# Patient Record
Sex: MALE | Race: WHITE | NOT HISPANIC OR LATINO | Employment: FULL TIME | URBAN - METROPOLITAN AREA
[De-identification: names, ages, dates, MRNs, and addresses within clinical notes are randomized per-mention and may not be internally consistent; named-entity substitution may affect disease eponyms.]

---

## 2017-03-09 ENCOUNTER — ALLSCRIPTS OFFICE VISIT (OUTPATIENT)
Dept: OTHER | Facility: OTHER | Age: 47
End: 2017-03-09

## 2017-07-12 ENCOUNTER — OFFICE VISIT (OUTPATIENT)
Dept: URGENT CARE | Facility: MEDICAL CENTER | Age: 47
End: 2017-07-12
Payer: COMMERCIAL

## 2017-07-12 PROCEDURE — G0382 LEV 3 HOSP TYPE B ED VISIT: HCPCS

## 2017-07-15 ENCOUNTER — HOSPITAL ENCOUNTER (INPATIENT)
Facility: HOSPITAL | Age: 47
LOS: 3 days | Discharge: HOME | DRG: 603 | End: 2017-07-18
Attending: EMERGENCY MEDICINE | Admitting: INTERNAL MEDICINE
Payer: COMMERCIAL

## 2017-07-15 DIAGNOSIS — M71.9 BURSITIS: ICD-10-CM

## 2017-07-15 DIAGNOSIS — L03.114 CELLULITIS OF LEFT ELBOW: Primary | ICD-10-CM

## 2017-07-15 PROBLEM — M25.529 ELBOW PAIN: Status: ACTIVE | Noted: 2017-07-15

## 2017-07-15 LAB
ANION GAP SERPL CALCULATED.3IONS-SCNC: 6 MMOL/L (ref 4–13)
BASOPHILS # BLD AUTO: 0.02 THOUSANDS/ΜL (ref 0–0.1)
BASOPHILS NFR BLD AUTO: 0 % (ref 0–1)
BUN SERPL-MCNC: 10 MG/DL (ref 5–25)
CALCIUM SERPL-MCNC: 8.8 MG/DL (ref 8.3–10.1)
CHLORIDE SERPL-SCNC: 100 MMOL/L (ref 100–108)
CO2 SERPL-SCNC: 31 MMOL/L (ref 21–32)
CREAT SERPL-MCNC: 0.79 MG/DL (ref 0.6–1.3)
EOSINOPHIL # BLD AUTO: 0.1 THOUSAND/ΜL (ref 0–0.61)
EOSINOPHIL NFR BLD AUTO: 1 % (ref 0–6)
ERYTHROCYTE [DISTWIDTH] IN BLOOD BY AUTOMATED COUNT: 13.7 % (ref 11.6–15.1)
ERYTHROCYTE [SEDIMENTATION RATE] IN BLOOD: 62 MM/HOUR (ref 0–10)
GFR SERPL CREATININE-BSD FRML MDRD: >60 ML/MIN/1.73SQ M
GLUCOSE SERPL-MCNC: 95 MG/DL (ref 65–140)
HCT VFR BLD AUTO: 44 % (ref 36.5–49.3)
HGB BLD-MCNC: 15.5 G/DL (ref 12–17)
LYMPHOCYTES # BLD AUTO: 1.58 THOUSANDS/ΜL (ref 0.6–4.47)
LYMPHOCYTES NFR BLD AUTO: 23 % (ref 14–44)
MCH RBC QN AUTO: 31.2 PG (ref 26.8–34.3)
MCHC RBC AUTO-ENTMCNC: 35.2 G/DL (ref 31.4–37.4)
MCV RBC AUTO: 89 FL (ref 82–98)
MONOCYTES # BLD AUTO: 0.63 THOUSAND/ΜL (ref 0.17–1.22)
MONOCYTES NFR BLD AUTO: 9 % (ref 4–12)
NEUTROPHILS # BLD AUTO: 4.66 THOUSANDS/ΜL (ref 1.85–7.62)
NEUTS SEG NFR BLD AUTO: 67 % (ref 43–75)
NRBC BLD AUTO-RTO: 0 /100 WBCS
PLATELET # BLD AUTO: 204 THOUSANDS/UL (ref 149–390)
PMV BLD AUTO: 11.1 FL (ref 8.9–12.7)
POTASSIUM SERPL-SCNC: 4.2 MMOL/L (ref 3.5–5.3)
RBC # BLD AUTO: 4.97 MILLION/UL (ref 3.88–5.62)
SODIUM SERPL-SCNC: 137 MMOL/L (ref 136–145)
WBC # BLD AUTO: 6.99 THOUSAND/UL (ref 4.31–10.16)

## 2017-07-15 PROCEDURE — 80048 BASIC METABOLIC PNL TOTAL CA: CPT | Performed by: EMERGENCY MEDICINE

## 2017-07-15 PROCEDURE — 96365 THER/PROPH/DIAG IV INF INIT: CPT

## 2017-07-15 PROCEDURE — 36415 COLL VENOUS BLD VENIPUNCTURE: CPT | Performed by: EMERGENCY MEDICINE

## 2017-07-15 PROCEDURE — 99284 EMERGENCY DEPT VISIT MOD MDM: CPT

## 2017-07-15 PROCEDURE — 85025 COMPLETE CBC W/AUTO DIFF WBC: CPT | Performed by: EMERGENCY MEDICINE

## 2017-07-15 PROCEDURE — 85652 RBC SED RATE AUTOMATED: CPT | Performed by: EMERGENCY MEDICINE

## 2017-07-15 RX ORDER — KETOROLAC TROMETHAMINE 30 MG/ML
15 INJECTION, SOLUTION INTRAMUSCULAR; INTRAVENOUS EVERY 6 HOURS PRN
Status: DISCONTINUED | OUTPATIENT
Start: 2017-07-15 | End: 2017-07-15

## 2017-07-15 RX ORDER — ENALAPRIL MALEATE 10 MG/1
20 TABLET ORAL DAILY
Status: DISCONTINUED | OUTPATIENT
Start: 2017-07-16 | End: 2017-07-18 | Stop reason: HOSPADM

## 2017-07-15 RX ORDER — CYCLOBENZAPRINE HCL 10 MG
TABLET ORAL 3 TIMES DAILY PRN
COMMUNITY
Start: 2016-03-17 | End: 2018-03-16 | Stop reason: SDUPTHER

## 2017-07-15 RX ORDER — ENALAPRIL MALEATE 20 MG/1
TABLET ORAL DAILY
COMMUNITY
Start: 2015-03-13 | End: 2018-03-16 | Stop reason: SDUPTHER

## 2017-07-15 RX ORDER — ACETAMINOPHEN 325 MG/1
650 TABLET ORAL EVERY 6 HOURS PRN
Status: DISCONTINUED | OUTPATIENT
Start: 2017-07-15 | End: 2017-07-18 | Stop reason: HOSPADM

## 2017-07-15 RX ORDER — INDOMETHACIN 25 MG/1
25 CAPSULE ORAL
COMMUNITY
Start: 2017-07-12 | End: 2017-07-18 | Stop reason: HOSPADM

## 2017-07-15 RX ORDER — OXYCODONE HYDROCHLORIDE AND ACETAMINOPHEN 5; 325 MG/1; MG/1
1 TABLET ORAL
COMMUNITY
Start: 2017-07-13 | End: 2017-07-23

## 2017-07-15 RX ORDER — TRAMADOL HYDROCHLORIDE 50 MG/1
50 TABLET ORAL EVERY 6 HOURS PRN
Status: DISCONTINUED | OUTPATIENT
Start: 2017-07-15 | End: 2017-07-18 | Stop reason: HOSPADM

## 2017-07-15 RX ORDER — KETOROLAC TROMETHAMINE 30 MG/ML
30 INJECTION, SOLUTION INTRAMUSCULAR; INTRAVENOUS ONCE
Status: COMPLETED | OUTPATIENT
Start: 2017-07-15 | End: 2017-07-15

## 2017-07-15 RX ORDER — AMLODIPINE BESYLATE 5 MG/1
5 TABLET ORAL DAILY
Status: DISCONTINUED | OUTPATIENT
Start: 2017-07-16 | End: 2017-07-18 | Stop reason: HOSPADM

## 2017-07-15 RX ORDER — KETOROLAC TROMETHAMINE 30 MG/ML
30 INJECTION, SOLUTION INTRAMUSCULAR; INTRAVENOUS EVERY 6 HOURS PRN
Status: DISCONTINUED | OUTPATIENT
Start: 2017-07-15 | End: 2017-07-17

## 2017-07-15 RX ORDER — CEPHALEXIN 500 MG/1
500 CAPSULE ORAL EVERY 6 HOURS SCHEDULED
COMMUNITY
Start: 2017-07-13 | End: 2017-07-20

## 2017-07-15 RX ORDER — CYCLOBENZAPRINE HCL 10 MG
10 TABLET ORAL 3 TIMES DAILY PRN
Status: DISCONTINUED | OUTPATIENT
Start: 2017-07-15 | End: 2017-07-18 | Stop reason: HOSPADM

## 2017-07-15 RX ORDER — ONDANSETRON 2 MG/ML
4 INJECTION INTRAMUSCULAR; INTRAVENOUS EVERY 6 HOURS PRN
Status: DISCONTINUED | OUTPATIENT
Start: 2017-07-15 | End: 2017-07-18 | Stop reason: HOSPADM

## 2017-07-15 RX ORDER — AMLODIPINE BESYLATE 5 MG/1
5 TABLET ORAL DAILY
COMMUNITY
Start: 2015-03-13 | End: 2018-03-16 | Stop reason: SDUPTHER

## 2017-07-15 RX ADMIN — CEFAZOLIN SODIUM 2000 MG: 2 SOLUTION INTRAVENOUS at 12:10

## 2017-07-15 RX ADMIN — CEFAZOLIN SODIUM 1000 MG: 1 SOLUTION INTRAVENOUS at 20:09

## 2017-07-15 RX ADMIN — KETOROLAC TROMETHAMINE 30 MG: 30 INJECTION, SOLUTION INTRAMUSCULAR at 20:09

## 2017-07-15 RX ADMIN — TRAMADOL HYDROCHLORIDE 50 MG: 50 TABLET, COATED ORAL at 23:02

## 2017-07-15 RX ADMIN — KETOROLAC TROMETHAMINE 30 MG: 30 INJECTION, SOLUTION INTRAMUSCULAR at 13:51

## 2017-07-16 LAB
ANION GAP SERPL CALCULATED.3IONS-SCNC: 8 MMOL/L (ref 4–13)
BUN SERPL-MCNC: 10 MG/DL (ref 5–25)
CALCIUM SERPL-MCNC: 8.6 MG/DL (ref 8.3–10.1)
CHLORIDE SERPL-SCNC: 101 MMOL/L (ref 100–108)
CO2 SERPL-SCNC: 29 MMOL/L (ref 21–32)
CREAT SERPL-MCNC: 0.85 MG/DL (ref 0.6–1.3)
ERYTHROCYTE [DISTWIDTH] IN BLOOD BY AUTOMATED COUNT: 13.9 % (ref 11.6–15.1)
GFR SERPL CREATININE-BSD FRML MDRD: >60 ML/MIN/1.73SQ M
GLUCOSE SERPL-MCNC: 112 MG/DL (ref 65–140)
HCT VFR BLD AUTO: 42.8 % (ref 36.5–49.3)
HGB BLD-MCNC: 14.5 G/DL (ref 12–17)
MCH RBC QN AUTO: 30.6 PG (ref 26.8–34.3)
MCHC RBC AUTO-ENTMCNC: 33.9 G/DL (ref 31.4–37.4)
MCV RBC AUTO: 90 FL (ref 82–98)
PLATELET # BLD AUTO: 199 THOUSANDS/UL (ref 149–390)
PMV BLD AUTO: 11.3 FL (ref 8.9–12.7)
POTASSIUM SERPL-SCNC: 3.8 MMOL/L (ref 3.5–5.3)
RBC # BLD AUTO: 4.74 MILLION/UL (ref 3.88–5.62)
SODIUM SERPL-SCNC: 138 MMOL/L (ref 136–145)
WBC # BLD AUTO: 6.52 THOUSAND/UL (ref 4.31–10.16)

## 2017-07-16 PROCEDURE — 85027 COMPLETE CBC AUTOMATED: CPT | Performed by: PHYSICIAN ASSISTANT

## 2017-07-16 PROCEDURE — 80048 BASIC METABOLIC PNL TOTAL CA: CPT | Performed by: PHYSICIAN ASSISTANT

## 2017-07-16 RX ADMIN — CEFAZOLIN SODIUM 1000 MG: 1 SOLUTION INTRAVENOUS at 20:44

## 2017-07-16 RX ADMIN — KETOROLAC TROMETHAMINE 30 MG: 30 INJECTION, SOLUTION INTRAMUSCULAR at 05:30

## 2017-07-16 RX ADMIN — CEFAZOLIN SODIUM 1000 MG: 1 SOLUTION INTRAVENOUS at 12:37

## 2017-07-16 RX ADMIN — TRAMADOL HYDROCHLORIDE 50 MG: 50 TABLET, COATED ORAL at 09:11

## 2017-07-16 RX ADMIN — CEFAZOLIN SODIUM 1000 MG: 1 SOLUTION INTRAVENOUS at 05:00

## 2017-07-16 RX ADMIN — ENALAPRIL MALEATE 20 MG: 10 TABLET ORAL at 09:07

## 2017-07-16 RX ADMIN — AMLODIPINE BESYLATE 5 MG: 5 TABLET ORAL at 09:07

## 2017-07-16 RX ADMIN — KETOROLAC TROMETHAMINE 30 MG: 30 INJECTION, SOLUTION INTRAMUSCULAR at 12:47

## 2017-07-16 RX ADMIN — TRAMADOL HYDROCHLORIDE 50 MG: 50 TABLET, COATED ORAL at 17:05

## 2017-07-16 RX ADMIN — KETOROLAC TROMETHAMINE 30 MG: 30 INJECTION, SOLUTION INTRAMUSCULAR at 21:39

## 2017-07-17 RX ORDER — IBUPROFEN 600 MG/1
600 TABLET ORAL EVERY 6 HOURS PRN
Status: DISCONTINUED | OUTPATIENT
Start: 2017-07-17 | End: 2017-07-17

## 2017-07-17 RX ORDER — IBUPROFEN 600 MG/1
600 TABLET ORAL
Status: DISCONTINUED | OUTPATIENT
Start: 2017-07-17 | End: 2017-07-18 | Stop reason: HOSPADM

## 2017-07-17 RX ADMIN — CEFAZOLIN SODIUM 1000 MG: 1 SOLUTION INTRAVENOUS at 05:00

## 2017-07-17 RX ADMIN — AMLODIPINE BESYLATE 5 MG: 5 TABLET ORAL at 08:39

## 2017-07-17 RX ADMIN — CEFAZOLIN SODIUM 1000 MG: 1 SOLUTION INTRAVENOUS at 11:55

## 2017-07-17 RX ADMIN — KETOROLAC TROMETHAMINE 30 MG: 30 INJECTION, SOLUTION INTRAMUSCULAR at 05:00

## 2017-07-17 RX ADMIN — ENALAPRIL MALEATE 20 MG: 10 TABLET ORAL at 08:39

## 2017-07-17 RX ADMIN — TRAMADOL HYDROCHLORIDE 50 MG: 50 TABLET, COATED ORAL at 08:40

## 2017-07-17 RX ADMIN — IBUPROFEN 600 MG: 600 TABLET, FILM COATED ORAL at 17:55

## 2017-07-17 RX ADMIN — IBUPROFEN 600 MG: 600 TABLET, FILM COATED ORAL at 12:25

## 2017-07-17 RX ADMIN — CEFAZOLIN SODIUM 1000 MG: 1 SOLUTION INTRAVENOUS at 22:11

## 2017-07-17 RX ADMIN — TRAMADOL HYDROCHLORIDE 50 MG: 50 TABLET, COATED ORAL at 22:11

## 2017-07-17 RX ADMIN — TRAMADOL HYDROCHLORIDE 50 MG: 50 TABLET, COATED ORAL at 14:40

## 2017-07-17 RX ADMIN — TRAMADOL HYDROCHLORIDE 50 MG: 50 TABLET, COATED ORAL at 02:01

## 2017-07-18 VITALS
SYSTOLIC BLOOD PRESSURE: 120 MMHG | TEMPERATURE: 96.5 F | HEART RATE: 63 BPM | OXYGEN SATURATION: 98 % | DIASTOLIC BLOOD PRESSURE: 76 MMHG | WEIGHT: 315 LBS | RESPIRATION RATE: 20 BRPM

## 2017-07-18 RX ORDER — IBUPROFEN 600 MG/1
600 TABLET ORAL EVERY 6 HOURS SCHEDULED
Qty: 30 TABLET | Refills: 0 | Status: SHIPPED | OUTPATIENT
Start: 2017-07-18 | End: 2017-12-27

## 2017-07-18 RX ADMIN — ENALAPRIL MALEATE 20 MG: 10 TABLET ORAL at 08:09

## 2017-07-18 RX ADMIN — TRAMADOL HYDROCHLORIDE 50 MG: 50 TABLET, COATED ORAL at 13:47

## 2017-07-18 RX ADMIN — IBUPROFEN 600 MG: 600 TABLET, FILM COATED ORAL at 11:33

## 2017-07-18 RX ADMIN — CEFAZOLIN SODIUM 1000 MG: 1 SOLUTION INTRAVENOUS at 04:12

## 2017-07-18 RX ADMIN — AMLODIPINE BESYLATE 5 MG: 5 TABLET ORAL at 08:09

## 2017-07-18 RX ADMIN — TRAMADOL HYDROCHLORIDE 50 MG: 50 TABLET, COATED ORAL at 05:48

## 2017-07-21 ENCOUNTER — ALLSCRIPTS OFFICE VISIT (OUTPATIENT)
Dept: OTHER | Facility: OTHER | Age: 47
End: 2017-07-21

## 2017-11-25 ENCOUNTER — OFFICE VISIT (OUTPATIENT)
Dept: URGENT CARE | Facility: MEDICAL CENTER | Age: 47
End: 2017-11-25
Payer: COMMERCIAL

## 2017-11-25 PROCEDURE — 87430 STREP A AG IA: CPT

## 2017-11-25 PROCEDURE — 99213 OFFICE O/P EST LOW 20 MIN: CPT

## 2017-11-26 NOTE — PROGRESS NOTES
Assessment    1  Sore throat (462) (J02 9)    Plan  Sore throat    · Amoxicillin 875 MG Oral Tablet; TAKE 1 TABLET EVERY 12 HOURS DAILY   · Rapid StrepA- POC; Source:Throat; Status:Resulted - Requires Verification,RetrospectiveBy Protocol Authorization;   Done: 46TKI3658 10:56AM    Discussion/Summary  Discussion Summary:   Symptoms are likely consistent with streptococcal sore throat although rapid strep test is negative  I will treat patient empirically with a course of oral antibiotics  Advised to follow up with primary care doctor for evaluation on Monday or Tuesday especially if symptoms are not improving or worse  Warm saltwater gargles and Tylenol Motrin as needed for pain and discomfort  Medication Side Effects Reviewed: Possible side effects of new medications were reviewed with the patient/guardian today  Understands and agrees with treatment plan: The treatment plan was reviewed with the patient/guardian  The patient/guardian understands and agrees with the treatment plan      Chief Complaint    1  Cold Symptoms  Chief Complaint Free Text Note Form: Pt with complaints of nasal congestion, cough, sore throat and headache for 2 days  States temp of 101 5 yesterday with bodyaches and chills  History of Present Illness  Hospital Based Practices Required Assessment:  Pain Assessment  the patient states they have pain  The pain is located in the throat  The patient describes the pain as aching  (on a scale of 0 to 10, the patient rates the pain at 8 )  Abuse And Domestic Violence Screen   Domestic violence screen not done today  Reason DV Screen not done: wife in room   Depression And Suicide Screen  Suicide screen not done today  -- Reason suicide screen not done: wife in room  Prefered Language is  english  Primary Language is  english  Pharyngitis (Brief):  Symptoms:  sore throat,-- dysphagia,-- odynophagia,-- nasal congestion,-- swollen glands,-- myalgias,-- headache,-- fever-- and-- chills, but-- no rash  The patient is currently experiencing symptoms  Associated symptoms:  fatigue-- and-- cough, but-- no hoarseness,-- no neck stiffness,-- no ear pain,-- no facial pain,-- no abdominal pain,-- no anorexia,-- no vomiting,-- no drooling-- and-- no stridor  Review of Systems  Focused-Male:  Constitutional: as noted in HPI  Active Problems  1  Arthritis (716 90) (M19 90)   2  Cellulitis of foot (682 7) (L03 119)   3  Cellulitis of left arm (682 3) (L03 114)   4  Essential hypertension (401 9) (I10)   5  Gout (274 9) (M10 9)   6  Left elbow pain (719 42) (M25 522)   7  Left leg swelling (729 81) (M79 89)   8  Morbid or severe obesity due to excess calories (278 01) (E66 01)   9  Other muscle spasm (728 85) (M62 838)   10  Thoracic back pain (724 1) (M54 6)    Past Medical History  1  History of acute gouty arthritis (V13 4) (Z87 39)   2  History of backache (V13 59) (Z87 39)   3  History of Other muscle spasm (728 85) (L65 273)    Family History  Mother    1  No pertinent family history  Father    2  No pertinent family history    Social History     · Never a smoker   · No alcohol use   · No drug use    Current Meds   1  AmLODIPine Besylate 5 MG Oral Tablet; Take 1 tablet daily; Therapy: (Recorded:83Jlh1424) to Recorded   2  Cyclobenzaprine HCl - 10 MG Oral Tablet; take one tablet by mouth at bedtime as needed; Therapy: 95YGG2262 to (Michelle Schroeder)  Requested for: 28CIJ2484; Last Rx:53Nho1839 Ordered   3  Enalapril Maleate 20 MG Oral Tablet; take one tablet by mouth daily; Therapy: 29GMF3262 to (Julia Sesay)  Requested for: 82MPC9811; Last Rx:09Mar2017 Ordered    Allergies  1  No Known Drug Allergies    2   No Known Latex Allergies    Vitals  Signs   Recorded: 00YZV5650 10:51AM   Temperature: 99 7 F  Heart Rate: 84  Respiration: 20  Systolic: 741  Diastolic: 90  Height: 6 ft 2 in  Weight: 342 lb   BMI Calculated: 43 91  BSA Calculated: 2 73  O2 Saturation: 96  Pain Scale: 8    Physical Exam   Constitutional  General appearance: No acute distress, well appearing and well nourished  Ears, Nose, Mouth, and Throat  External inspection of ears and nose: Normal    Otoscopic examination: Tympanic membrance translucent with normal light reflex  Canals patent without erythema  Nasal mucosa, septum, and turbinates: Abnormal   There was a mucoid discharge from both nares  The bilateral nasal mucosa was edematous  Oropharynx: Abnormal   The posterior pharynx was not erythematous-- and-- did not have an exudate  There was enlargement and erythema, but no swelling and no concretions of both tonsils exudate  Pulmonary  Respiratory effort: No increased work of breathing or signs of respiratory distress  Auscultation of lungs: Clear to auscultation  Cardiovascular  Auscultation of heart: Normal rate and rhythm, normal S1 and S2, without murmurs  Lymphatic  Palpation of lymph nodes in neck: Abnormal   bilateral anterior cervical node enlargement, but-- no posterior cervical node enlargement-- and-- no submandibular node enlargement        Results/Data  Rapid Tilda Juliano- POC 32ZMT8291 10:56AM Alissa Flatness     Test Name Result Flag Reference   Rapid Strep Negative                       Signatures   Electronically signed by : YOLIS Vizcaino ; Nov 25 2017 11:14AM EST                       (Author)

## 2017-12-06 ENCOUNTER — ALLSCRIPTS OFFICE VISIT (OUTPATIENT)
Dept: OTHER | Facility: OTHER | Age: 47
End: 2017-12-06

## 2017-12-27 ENCOUNTER — HOSPITAL ENCOUNTER (EMERGENCY)
Facility: HOSPITAL | Age: 47
Discharge: HOME | End: 2017-12-27
Attending: EMERGENCY MEDICINE | Admitting: EMERGENCY MEDICINE
Payer: COMMERCIAL

## 2017-12-27 VITALS
HEART RATE: 97 BPM | DIASTOLIC BLOOD PRESSURE: 79 MMHG | SYSTOLIC BLOOD PRESSURE: 126 MMHG | WEIGHT: 315 LBS | TEMPERATURE: 97.5 F | RESPIRATION RATE: 18 BRPM | OXYGEN SATURATION: 100 %

## 2017-12-27 DIAGNOSIS — M10.9 GOUT OF LEFT ANKLE: Primary | ICD-10-CM

## 2017-12-27 PROCEDURE — 99283 EMERGENCY DEPT VISIT LOW MDM: CPT

## 2017-12-27 PROCEDURE — 96372 THER/PROPH/DIAG INJ SC/IM: CPT

## 2017-12-27 RX ORDER — KETOROLAC TROMETHAMINE 30 MG/ML
30 INJECTION, SOLUTION INTRAMUSCULAR; INTRAVENOUS ONCE
Status: COMPLETED | OUTPATIENT
Start: 2017-12-27 | End: 2017-12-27

## 2017-12-27 RX ORDER — PREDNISONE 10 MG/1
TABLET ORAL
Qty: 27 TABLET | Refills: 0 | Status: SHIPPED | OUTPATIENT
Start: 2017-12-27 | End: 2018-03-01 | Stop reason: ALTCHOICE

## 2017-12-27 RX ADMIN — KETOROLAC TROMETHAMINE 30 MG: 30 INJECTION, SOLUTION INTRAMUSCULAR at 05:32

## 2017-12-27 NOTE — DISCHARGE INSTRUCTIONS
Gout   WHAT YOU NEED TO KNOW:   Gout is a form of arthritis that causes severe joint pain, redness, swelling, and stiffness  Acute gout pain starts suddenly, gets worse quickly, and stops on its own  Acute gout can become chronic and cause permanent damage to the joints  DISCHARGE INSTRUCTIONS:   Return to the emergency department if:   · You have severe pain in one or more of your joints that you cannot tolerate  · You have a fever or redness that spreads beyond the joint area  Contact your healthcare provider if:   · You have new symptoms, such as a rash, after you start gout treatment  · Your joint pain and swelling do not go away, even after treatment  · You are not urinating as much or as often as you usually do  · You have trouble taking your gout medicines  · You have questions or concerns about your condition or care  Medicines: You may need any of the following:  · NSAIDs , such as ibuprofen, help decrease swelling, pain, and fever  · Gout medicine  decreases joint pain and swelling  It may also be given to prevent new gout attacks  · Steroids  reduce inflammation and can help your joint stiffness and pain during gout attacks  Uric acid medicine  may be given to reduce the amount of uric acid your body makes  Some medicines may help you pass more uric acid when you urinate  Follow up with your healthcare provider as directed:  Write down your questions so you remember to ask them during your visits  Manage gout:   · Rest your painful joint so it can heal   Your healthcare provider may recommend crutches or a walker if the affected joint is in a leg  · Apply ice to your joint  Ice decreases pain and swelling  Use an ice pack, or put crushed ice in a plastic bag  Cover the ice pack or bag with a towel before you apply it to your painful joint  Apply ice for 15 to 20 minutes every hour, or as directed  Elevate your joint  Elevation helps reduce swelling and pain  Raise your joint above the level of your heart as often as you can  Prop your painful joint on pillows to keep it above your heart comfortably  Prevent gout attacks:   · Do not eat high-purine foods  These foods include meats, seafood, asparagus, spinach, cauliflower, and some types of beans  Healthcare providers may tell you to eat more low-fat milk products, such as yogurt  Milk products may decrease your risk for gout attacks  Vitamin C and coffee may also help  Your healthcare provider or dietitian can help you create a meal plan  Drink more liquids as directed  Liquids such as water help remove uric acid from your body  Ask how much liquid to drink each day and which liquids are best for you  · Limit or do not drink alcohol as directed  Alcohol can trigger a gout attack  Alcohol also increases your risk for dehydration  Ask your healthcare provider if alcohol is safe for you

## 2017-12-27 NOTE — ED PROVIDER NOTES
History  Chief Complaint   Patient presents with    Ankle Pain     Pt states "I have been having a real bad episode of gout since thanksgiving  Over the weekend my left ankle has started hurting more then normal and the pain seems to shift around my foot"     53 yo male with longstanding h/o gouty arthritis, of multiple joints, has been having a flare of his typical joint pain, in ankles and knees and feet bilaterally since around Thanksgiving, with most of the discomfort settling in the left ankle  He has followed up with PCP, treated with colchicine currently  He was given a medrol dose monty in early December but those are the only steroids he has been given  He denies fever, chills  History provided by:  Patient  Ankle Pain   Location:  Ankle  Injury: no    Ankle location:  L ankle  Pain details:     Quality:  Aching and burning    Radiates to:  Does not radiate    Severity:  Moderate    Onset quality:  Gradual    Duration:  4 weeks    Timing:  Sporadic    Progression:  Waxing and waning  Chronicity:  Chronic  Associated symptoms: no fever and no neck pain        Prior to Admission Medications   Prescriptions Last Dose Informant Patient Reported? Taking? amLODIPine (NORVASC) 5 mg tablet   Yes No   Sig: Take 5 mg by mouth daily     cyclobenzaprine (FLEXERIL) 10 mg tablet   Yes No   Sig: Take by mouth 3 (three) times a day as needed for muscle spasms     enalapril (VASOTEC) 20 mg tablet   Yes No   Sig: Take by mouth daily     ibuprofen (MOTRIN) 600 mg tablet   No No   Sig: Take 1 tablet by mouth every 6 (six) hours for 10 days      Facility-Administered Medications: None       Past Medical History:   Diagnosis Date    Arthritis     Gout     Hypertension        History reviewed  No pertinent surgical history  History reviewed  No pertinent family history  I have reviewed and agree with the history as documented      Social History   Substance Use Topics    Smoking status: Former Smoker    Smokeless tobacco: Never Used    Alcohol use Yes      Comment: rarely        Review of Systems   Constitutional: Negative for appetite change, chills and fever  HENT: Negative for sore throat  Respiratory: Negative for cough, shortness of breath and wheezing  Cardiovascular: Negative for chest pain and palpitations  Gastrointestinal: Negative for abdominal pain, diarrhea, nausea and vomiting  Genitourinary: Negative for dysuria and hematuria  Musculoskeletal: Positive for arthralgias  Negative for neck pain  Skin: Negative for rash  Neurological: Negative for dizziness, weakness and headaches  Psychiatric/Behavioral: Negative for suicidal ideas  All other systems reviewed and are negative  Physical Exam  ED Triage Vitals [12/27/17 0508]   Temperature Pulse Respirations Blood Pressure SpO2   97 5 °F (36 4 °C) 97 18 131/92 100 %      Temp src Heart Rate Source Patient Position - Orthostatic VS BP Location FiO2 (%)   -- -- -- -- --      Pain Score       8           Orthostatic Vital Signs  Vitals:    12/27/17 0508   BP: 131/92   Pulse: 97       Physical Exam   Constitutional: He is oriented to person, place, and time  He appears well-developed and well-nourished  HENT:   Head: Normocephalic and atraumatic  Right Ear: External ear normal    Left Ear: External ear normal    Nose: Nose normal    Mouth/Throat: Oropharynx is clear and moist    Eyes: Conjunctivae and EOM are normal  Pupils are equal, round, and reactive to light  Neck: Normal range of motion  Neck supple  Cardiovascular: Normal rate  Pulmonary/Chest: Effort normal    Abdominal: There is no tenderness  Musculoskeletal: Normal range of motion  Left foot: There is tenderness  Feet:    Neurological: He is alert and oriented to person, place, and time  No cranial nerve deficit  Coordination normal    Skin: Skin is warm and dry  Psychiatric: He has a normal mood and affect   His behavior is normal  Judgment and thought content normal    Nursing note and vitals reviewed  ED Medications  Medications   ketorolac (TORADOL) injection 30 mg (30 mg Intramuscular Given 12/27/17 0532)       Diagnostic Studies  Results Reviewed     None                 No orders to display              Procedures  Procedures       Phone Contacts  ED Phone Contact    ED Course  ED Course                                MDM  Number of Diagnoses or Management Options  Gout of left ankle:   Diagnosis management comments: He is having some temporary relief but just can't get over the inflammation before it settles in another joint  He recalls some improvement early in the month, which corresponds to a medrol dose monty  I am inclined to put him on a longer, higher dose steroid taper  CritCare Time    Disposition  Final diagnoses:   Gout of left ankle     Time reflects when diagnosis was documented in both MDM as applicable and the Disposition within this note     Time User Action Codes Description Comment    12/27/2017  5:31 AM Fuentes JONES Add [M10 9] Gout of left ankle       ED Disposition     ED Disposition Condition Comment    Discharge  Refugia Shine discharge to home/self care  Condition at discharge: Good        Follow-up Information     Follow up With Specialties Details Why Contact Sukh Judge, DO Family Medicine Go to For followup [place] [address]  [phone]          Patient's Medications   Discharge Prescriptions    PREDNISONE 10 MG TABLET    START 4 po daily on days 1-5  Then 3 po on Day 6   2 po on Day 7   1 po on Day 8   1/2 po on Day 9 and 10  STOP       Start Date: 12/27/2017End Date: --       Order Dose: --       Quantity: 27 tablet    Refills: 0     No discharge procedures on file      ED Provider  Electronically Signed by           Harmony Dejesus MD  12/27/17 Alexis Murdock MD  12/27/17 5747

## 2018-01-13 NOTE — MISCELLANEOUS
Assessment    1  Cellulitis of left arm (682 3) (L03 114)    Discussion/Summary  Discussion Summary:   Patient instructed to complete course of cephalexin  Patient encouraged to drink plenty of fluids and rest  Patient may return to work at full duty on 7/24/17  Patient to return to the office when necessary  Medication SE Review and Pt Understands Tx: Possible side effects of new medications were reviewed with the patient/guardian today  The treatment plan was reviewed with the patient/guardian  The patient/guardian understands and agrees with the treatment plan      Chief Complaint  Chief Complaint Free Text Note Form: hospital follow up - left elbow infection      History of Present Illness  TCM Communication [facility]: The patient is being contacted for follow-up after hospitalization and [facility] records were reviewed  He was hospitalized at [facility]  The dates of hospitalization: July 15, 2017 through July 18, 2017, date of admission: July 15, 2017, date of discharge: July 18, 2017  Diagnosis: Cellulitis of Left elbow, Olecranon bursitis  He was discharged to home  Medications reviewed and updated today  He scheduled a follow up appointment  Symptoms: arm pain left side and constipation, but no fever, no weakness, no dizziness, no headache, no fatigue, no cough, no shortness of breath, no chest pain, no back pain on left side, no back pain on right side, no arm pain on right side, no leg pain on left side, no leg pain on right side, no upper abdominal pain, no middle abdominal pain, no lower abdominal pain, no rash:, no anorexia, no vomiting, no loose stools, no pain with urinating and no swelling  The patient is currently experiencing symptoms  Counseling was provided to the patient  Topics counseled included importance of compliance with treatment     Communication performed and completed by Jv Carbajal   HPI: Patient is being seen in follow-up from recent hospitalization at [address] from 7/15/17 until 7/18/17 for left elbow cellulitis secondary to left olecranon bursitis  Patient discharged home on cephalexin and is feeling significantly better overall  Active Problems    1  Arthritis (716 90) (M19 90)   2  Cellulitis of foot (682 7) (L03 119)   3  Essential hypertension (401 9) (I10)   4  Gout (274 9) (M10 9)   5  Left elbow pain (719 42) (M25 522)   6  Left leg swelling (729 81) (M79 89)   7  Morbid or severe obesity due to excess calories (278 01) (E66 01)   8  Other muscle spasm (728 85) (M62 838)   9  Thoracic back pain (724 1) (M54 6)    Past Medical History    1  History of acute gouty arthritis (V13 4) (Z87 39)   2  History of backache (V13 59) (Z87 39)   3  History of Other muscle spasm (728 85) (Y11 321)    Family History  Mother    1  No pertinent family history  Father    2  No pertinent family history    Social History    · Never a smoker   · No alcohol use   · No drug use    Current Meds   1  AmLODIPine Besylate 5 MG Oral Tablet; Take 1 tablet daily; Therapy: (Recorded:39Ypi2889) to Recorded   2  Cephalexin 500 MG Oral Capsule; TAKE 1 CAPSULE 3 times daily until finished; Therapy: (Recorded:07Hll1021) to Recorded   3  Cyclobenzaprine HCl - 10 MG Oral Tablet; take 1 tablet daily prn; Therapy: (Maricel Linton) to Recorded   4  Enalapril Maleate 20 MG Oral Tablet; take one tablet by mouth daily; Therapy: 42EMD9917 to (Melanie Pagan)  Requested for: 17UYV3898; Last   Rx:09Mar2017 Ordered    Allergies    1  No Known Latex Allergies    Vitals  Signs   Recorded: 69Cgi6998 10:53AM   Heart Rate: 84  Respiration: 16  Systolic: 395  Diastolic: 78  Recorded: 92HVG3904 10:38AM   Temperature: 98 F  Height: 6 ft 2 in  Weight: 349 lb   BMI Calculated: 44 81  BSA Calculated: 2 75    Physical Exam    Constitutional   General appearance: No acute distress, well appearing and well nourished      Eyes   Conjunctiva and lids: No swelling, erythema, or discharge  Ears, Nose, Mouth, and Throat   External inspection of ears and nose: Normal     Otoscopic examination: Tympanic membrance translucent with normal light reflex  Canals patent without erythema  Nasal mucosa, septum, and turbinates: Normal without edema or erythema  Oropharynx: Normal with no erythema, edema, exudate or lesions  Pulmonary   Respiratory effort: No increased work of breathing or signs of respiratory distress  Auscultation of lungs: Clear to auscultation, equal breath sounds bilaterally, no wheezes, no rales, no rhonci  Cardiovascular   Auscultation of heart: Normal rate and rhythm, normal S1 and S2, without murmurs  Examination of extremities for edema and/or varicosities: Normal     Abdomen   Abdomen: Non-tender, no masses  Lymphatic   Palpation of lymph nodes in neck: No lymphadenopathy  Musculoskeletal   Gait and station: Normal     Inspection/palpation of joints, bones, and muscles: Normal   Left elbow range of motion intact and nontender  Negative erythema or swelling  Skin   Skin and subcutaneous tissue: Normal without rashes or lesions      Psychiatric   Orientation to person, place and time: Normal     Mood and affect: Normal          Signatures   Electronically signed by : Suma Bouhcer DO; Jul 21 2017 10:58AM EST                       (Author)

## 2018-01-13 NOTE — PROGRESS NOTES
Assessment    1  Essential hypertension (401 9) (I10)   2  Gout (274 9) (M10 9)   3  Morbid or severe obesity due to excess calories (278 01) (E66 01)    Plan  Essential hypertension    · AmLODIPine Besylate 5 MG Oral Tablet (Norvasc); take one tablet by mouth daily   · Enalapril Maleate 20 MG Oral Tablet; take one tablet by mouth daily   · Begin a limited exercise program ; Status:Complete;   Done: 99DFV7802   · Begin or continue regular aerobic exercise  Gradually work up to at least 3 sessions of  30 minutes of exercise a week ; Status:Complete;   Done: 02IBQ4113   · Continue with our present treatment plan ; Status:Complete;   Done: 15KJP5088   · Eat a low fat and low cholesterol diet ; Status:Complete;   Done: 33FOF8144   · Restrict the salt in your diet by avoiding highly salted foods ; Status:Complete;   Done:  84XBM3570   · Restrict your sodium (salt) intake to 2 grams per day ; Status:Complete;   Done:  74LMI6565   · Take your blood pressure twice a week  Record the numbers and bring them with you to  your appointments ; Status:Complete;   Done: 42NOQ6965   · We encourage you to begin to make lifestyle changes to help control your blood  pressure  These may include losing weight, increasing your activity level, limiting salt in  your diet, decreasing alcohol intake, and eating a diet low in fat and rich in fruits  and vegetables ; Status:Complete;   Done: 67AIE2871   · Call [phone] if: Your blood pressure is frequently higher than 140/90 ;  Status:Complete;   Done: 94ARR2822  Thoracic back pain    · Cyclobenzaprine HCl - 10 MG Oral Tablet; TAKE 1 TABLET AT BEDTIME AS  NEEDED    Discussion/Summary    Patient to continue present treatment  Patient to follow a low-fat, low-salt and a low carbohydrate diet and get regular exercise 5 days a week for 30 minutes   Discussed importance of weight loss and recommend patient attempt to lose 10% of his body weight for approximately 32 pounds over the next several months  Patient to return the office in 1 year  Possible side effects of new medications were reviewed with the patient/guardian today  The treatment plan was reviewed with the patient/guardian  The patient/guardian understands and agrees with the treatment plan      Chief Complaint  Non Fasting   Patient is here today for follow up of chronic conditions described in HPI  History of Present Illness  Patient is here for routine appointment for chronic conditions and he is not fasting today  Patient has been feeling well overall and his weight is down 20 pounds over the past year  No regular exercise program although patient is following his diet more carefully  The patient presents for follow-up of essential hypertension  The patient states he has been doing well with his blood pressure control since the last visit  He has no comorbid illnesses  He has no significant interval events  Symptoms: denies impaired vision, denies dyspnea, denies chest pain, denies intermittent leg claudication and stable lower extremity edema  Associated symptoms include no headache  Home monitoring: The patient checks his blood pressure sporadically  Blood pressure control has been good  Medications: the patient is adherent with his medication regimen  He denies medication side effects  Disease Management: the patient is doing well with his blood pressure goals  The patient is being seen for follow-up of gout  The patient reports no change in the condition  He has had no significant interval events  Interval symptoms:  denies pain, denies swelling, denies erythema, denies warmth and denies tenderness  Associated symptoms: no fever and no chills  The patient is not currently on medication for this problem  Disease management:  the patient is doing well with his goals  Active Problems    1  Arthritis (716 90) (M19 90)   2  Cellulitis of foot (682 7) (L03 119)   3   Essential hypertension (401 9) (I10) 4  Gout (274 9) (M10 9)   5  Left leg swelling (729 81) (M79 89)   6  Morbid or severe obesity due to excess calories (278 01) (E66 01)   7  Other muscle spasm (728 85) (M62 838)   8  Thoracic back pain (724 1) (M54 6)    Past Medical History    1  History of acute gouty arthritis (V13 4) (Z87 39)   2  History of backache (V13 59) (Z87 39)   3  History of Other muscle spasm (728 85) (L55 142)    Family History  Mother    1  No pertinent family history  Father    2  No pertinent family history    Social History    · Never a smoker   · No alcohol use   · No drug use    Current Meds   1  AmLODIPine Besylate 5 MG Oral Tablet; take one tablet by mouth daily; Therapy: 29YIA1621 to (Evaluate:11Mar2017)  Requested for: 02FDV7334; Last   Rx:10Jan2017 Ordered   2  Cyclobenzaprine HCl - 10 MG Oral Tablet; TAKE 1 TABLET AT BEDTIME AS NEEDED; Therapy: 24QGU1594 to (Evaluate:39Gat8276)  Requested for: 77UWR6004; Last   Rx:10Jan2017 Ordered   3  Enalapril Maleate 20 MG Oral Tablet; take one tablet by mouth daily; Therapy: 00PUZ5769 to (Evaluate:11Mar2017)  Requested for: 59GBI5526; Last   Rx:10Jan2017 Ordered    Allergies    1  No Known Latex Allergies    Vitals  Vital Signs    Recorded: 64SLX0511 03:49PM Recorded: 75JIM2416 03:33PM   Temperature  97 7 F   Heart Rate 84    Respiration 16    Systolic 863    Diastolic 80    Height  6 ft 2 in   Weight  325 lb    BMI Calculated  41 73   BSA Calculated  2 67     Physical Exam    Constitutional   General appearance: No acute distress, well appearing and well nourished  Eyes   Conjunctiva and lids: No swelling, erythema, or discharge  Ears, Nose, Mouth, and Throat   External inspection of ears and nose: Normal     Otoscopic examination: Tympanic membrance translucent with normal light reflex  Canals patent without erythema  Nasal mucosa, septum, and turbinates: Normal without edema or erythema  Oropharynx: Normal with no erythema, edema, exudate or lesions      Pulmonary Respiratory effort: No increased work of breathing or signs of respiratory distress  Auscultation of lungs: Clear to auscultation, equal breath sounds bilaterally, no wheezes, no rales, no rhonci  Cardiovascular   Auscultation of heart: Normal rate and rhythm, normal S1 and S2, without murmurs  Examination of extremities for edema and/or varicosities: Normal     Carotid pulses: Normal     Abdomen   Abdomen: Non-tender, no masses  The abdomen was obese  Lymphatic   Palpation of lymph nodes in neck: No lymphadenopathy  Musculoskeletal   Gait and station: Normal     Inspection/palpation of joints, bones, and muscles: Normal     Skin   Skin and subcutaneous tissue: Normal without rashes or lesions  Psychiatric   Orientation to person, place and time: Normal     Mood and affect: Normal          Results/Data  PHQ-2 Adult Depression Screening 70HXB1221 03:34PM User, s     Test Name Result Flag Reference   PHQ-2 Adult Depression Score 1     Over the last two weeks, how often have you been bothered by any of the following problems?   Little interest or pleasure in doing things: Several days - 1  Feeling down, depressed, or hopeless: Not at all - 0   PHQ-2 Adult Depression Screening Negative         Signatures   Electronically signed by : Katy Fernández DO; Mar  9 2017  4:06PM EST                       (Author)

## 2018-01-13 NOTE — RESULT NOTES
Verified Results  (1) CBC/PLT/DIFF 09Apr2016 09:06AM Nakia Jessica Order Number: [id number]     Order Number: [id number]     Test Name Result Flag Reference   WBC COUNT 6 59 Thousand/uL  4 31-10 16   RBC COUNT 5 31 Million/uL  3 88-5 62   HEMOGLOBIN 15 9 g/dL  12 0-17 0   HEMATOCRIT 48 1 %  36 5-49 3   MCV 91 fL  82-98   MCH 29 9 pg  26 8-34 3   MCHC 33 1 g/dL  31 4-37 4   RDW 13 8 %  11 6-15 1   MPV 11 7 fL  8 9-12 7   PLATELET COUNT 649 Thousands/uL  149-390   nRBC AUTOMATED 0 /100 WBCs     NEUTROPHILS RELATIVE PERCENT 61 %  43-75   LYMPHOCYTES RELATIVE PERCENT 30 %  14-44   MONOCYTES RELATIVE PERCENT 7 %  4-12   EOSINOPHILS RELATIVE PERCENT 2 %  0-6   BASOPHILS RELATIVE PERCENT 0 %  0-1   NEUTROPHILS ABSOLUTE COUNT 4 00 Thousands/µL  1 85-7 62   LYMPHOCYTES ABSOLUTE COUNT 1 98 Thousands/µL  0 60-4 47   MONOCYTES ABSOLUTE COUNT 0 46 Thousand/µL  0 17-1 22   EOSINOPHILS ABSOLUTE COUNT 0 14 Thousand/µL  0 00-0 61   BASOPHILS ABSOLUTE COUNT 0 01 Thousands/µL  0 00-0 10     (1) COMPREHENSIVE METABOLIC PANEL 54REN0337 98:71BU Nakia Jessica Order Number: [id number]      National Kidney Disease Education Program recommendations are as follows:  GFR calculation is accurate only with a steady state creatinine  Chronic Kidney disease less than 60 ml/min/1 73 sq  meters  Kidney failure less than 15 ml/min/1 73 sq  meters  Test Name Result Flag Reference   GLUCOSE,RANDM 88 mg/dL     If the patient is fasting, the ADA then defines impaired fasting glucose as > 100 mg/dL and diabetes as > or equal to 123 mg/dL     SODIUM 137 mmol/L  136-145   POTASSIUM 4 9 mmol/L  3 5-5 3   Slight Hemolysis   CHLORIDE 101 mmol/L  100-108   CARBON DIOXIDE 31 mmol/L  21-32   ANION GAP (CALC) 5 mmol/L  4-13   BLOOD UREA NITROGEN 13 mg/dL  5-25   CREATININE 0 71 mg/dL  0 60-1 30   Standardized to IDMS reference method   CALCIUM 8 8 mg/dL  8 3-10 1   BILI, TOTAL 0 58 mg/dL  0 20-1 00   ALK PHOSPHATAS 79 U/L   ALT (SGPT) 32 U/L  12-78   AST(SGOT) 25 U/L  5-45   ALBUMIN 3 8 g/dL  3 5-5 0   TOTAL PROTEIN 8 6 g/dL H 6 4-8 2   eGFR Non-African American      >60 0 ml/min/1 73sq m     (1) LIPID PANEL, FASTING 09Apr2016 09:06AM [employer] Order Number: [id number]      Triglyceride:         Normal              <150 mg/dl       Borderline High    150-199 mg/dl       High               200-499 mg/dl       Very High          >499 mg/dl  Cholesterol:         Desirable        <200 mg/dl      Borderline High  200-239 mg/dl      High             >239 mg/dl  HDL Cholesterol:        High    >59 mg/dL      Low     <41 mg/dL     Test Name Result Flag Reference   CHOLESTEROL 145 mg/dL     HDL,DIRECT 36 mg/dL L 40-60   LDL CHOLESTEROL CALCULATED 94 mg/dL  0-100   TRIGLYCERIDES 76 mg/dL  <=150   Specimen collection should occur prior to N-Acetylcysteine or Metamizole administration due to the potential for falsely depressed results  (1) PSA (SCREEN) (Dx V76 44 Screen for Prostate Cancer) 09Apr2016 09:06AM [employer] Order Number: [id number]     Order Number: [id number]     Test Name Result Flag Reference   PROSTATE SPECIFIC ANTIGEN 0 5 ng/mL  0 0-4 0     (1) TSH WITH FT4 REFLEX 09Apr2016 09:06AM [employer] Order Number: [id number]    Patients undergoing fluorescein dye angiography may retain small amounts of fluorescein in the body for 48-72 hours post procedure  Samples containing fluorescein can produce falsely depressed TSH values  If the patient had this procedure,a specimen should be resubmitted post fluorescein clearance  Test Name Result Flag Reference   TSH 1 090 uIU/mL  0 358-3 740     (1) URIC ACID 09Apr2016 09:06AM [employer] Order Number: [id number]     Test Name Result Flag Reference   URIC ACID 7 3 mg/dL  4 2-8 0   Specimen collection should occur prior to Metamizole administration due to the potential for falsely depressed results       (1) URINALYSIS (will reflex a microscopy if leukocytes, occult blood, protein or nitrites are not within normal limits) 09Apr2016 09:06AM Melissa Moore Order Number: [id number]     Order Number: [id number]     Test Name Result Flag Reference   COLOR Yellow     CLARITY Clear     SPECIFIC GRAVITY UA 1 016  1 003-1 030   PH UA 6 5  4 5-8 0   LEUKOCYTE ESTERASE UA Negative  Negative   NITRITE UA Negative  Negative   PROTEIN UA Negative mg/dl  Negative   GLUCOSE UA Negative mg/dl  Negative   KETONES UA Negative mg/dl  Negative   UROBILINOGEN UA 0 2 E U /dl  0 2, 1 0 E U /dl   BILIRUBIN UA Negative  Negative   BLOOD UA Negative  Negative     (1) VITAMIN D 25-HYDROXY 09Apr2016 09:06AM Melissa Moore Order Number: [id number]     Order Number: [id number]     Test Name Result Flag Reference   VIT D 25-HYDROX 19 0 ng/mL L 30 0-100 0       Discussion/Summary   Labs ok, except Vit D decreased  Cont present Tx and add Vit D 1000 IU daily

## 2018-01-14 VITALS
BODY MASS INDEX: 40.43 KG/M2 | TEMPERATURE: 97.7 F | SYSTOLIC BLOOD PRESSURE: 118 MMHG | RESPIRATION RATE: 16 BRPM | HEIGHT: 74 IN | DIASTOLIC BLOOD PRESSURE: 80 MMHG | HEART RATE: 84 BPM | WEIGHT: 315 LBS

## 2018-01-16 NOTE — MISCELLANEOUS
Message  Return to work or school:   Marry Perrin is under my professional care  He was seen in my office on 7/21/17       Return to work full duty on 7/24/17          Signatures   Electronically signed by : Isha Mac DO; Jul 21 2017 12:08PM EST                       (Author)

## 2018-01-18 NOTE — PROGRESS NOTES
Assessment    1  Left leg swelling (729 81) (M79 89)   2  Cellulitis of foot (682 7) (L03 119)    Plan  Cellulitis of foot    · Levofloxacin 750 MG Oral Tablet; Take 1 tablet daily  Left leg swelling    · VAS LOWER LIMB VENOUS DUPLEX STUDY, UNILATERAL/LIMITED; Unilateral Side:Left;  Status:Hold For - Scheduling; Requested for:76Cob8172;     Discussion/Summary  Discussion Summary:   Patient's unilateral swelling of the leg and foot was concerning for DVT and is a Doppler ultrasound was obtained  It was negative for any DVT  At this point I will treat patient for cellulitis of left lower extremity and advised to followup with primary care Dr  for reevaluation in about 5 days and sooner if any worsening of the symptoms  May use Tylenol/Motrin as needed for pain or discomfort  Rest and elevation advised  Medication Side Effects Reviewed: Possible side effects of new medications were reviewed with the patient/guardian today  Understands and agrees with treatment plan: The treatment plan was reviewed with the patient/guardian  The patient/guardian understands and agrees with the treatment plan      Chief Complaint    1  Foot Problem  Chief Complaint Free Text Note Form: Patient c/o L foot pain with swelling      History of Present Illness  HPI: Patient has few day history of pain and swelling of the left foot which is progressively getting worse and now extending to the left leg  Denies any fever or chills  Denies any localized injury or trauma  Denies any chest pain or shortness of breath  Denies any past medical history of DVT or PE  Patient works as a  and currently not able to use his left leg effectively due to pain and swelling  Hospital Based Practices Required Assessment:   Pain Assessment   the patient states they have pain  The pain is located in the L foot   The patient describes the pain as aching  (on a scale of 0 to 10, the patient rates the pain at 6 )   Abuse And Domestic Violence Screen Yes, the patient is safe at home  The patient states no one is hurting them  Depression And Suicide Screen  No, the patient has not had thoughts of hurting themself  No, the patient has not felt depressed in the past 7 days  Prefered Language is  [place]  Primary Language is  English  Foot Problem: [facility] presents with complaints of foot problem  Associated symptoms include foot pain, foot swelling, ankle swelling, limited weight bearing, abnormal gait and leg pain, but no foot numbness, no foot ulcers, no foot warts, no peeling skin and no back pain  Review of Systems  Focused-Male:   Constitutional: as noted in HPI  Active Problems    1  Essential hypertension (401 9) (I10)   2  Morbid or severe obesity due to excess calories (278 01) (E66 01)   3  Other muscle spasm (728 85) (W59 919)    Past Medical History    1  History of acute gouty arthritis (V13 4) (Z87 39)   2  History of backache (V13 59) (Z87 39)   3  History of Other muscle spasm (728 85) (M93 338)    Family History    1  No pertinent family history    2  No pertinent family history    Social History    · Never a smoker   · No alcohol use   · No drug use    Current Meds   1  AmLODIPine Besylate 5 MG Oral Tablet; take one tablet by mouth daily; Therapy: 35IXJ4860 to (Evaluate:76Klu7105)  Requested for: 93YNW6705; Last   Rx:95Gjg1091 Ordered   2  Enalapril Maleate 20 MG Oral Tablet; take one tablet by mouth daily; Therapy: 38CVM1395 to (Evaluate:22Ghc8724)  Requested for: 00ELS1708; Last   Rx:08Ugs1537 Ordered    Allergies    1   No Known Latex Allergies    Vitals  Signs [Data Includes: Current Encounter]   Recorded: 70FXU8454 10:43AM   Temperature: 97 3 F  Heart Rate: 91  Respiration: 18  Systolic: 791  Diastolic: 72  Height: 7 ft 2 in  Weight: 345 lb 6 oz  BMI Calculated: 32 83  BSA Calculated: 3 06  O2 Saturation: 98    Physical Exam    Constitutional   General appearance: No acute distress, well appearing and well nourished  Pulmonary   Respiratory effort: No increased work of breathing or signs of respiratory distress  Auscultation of lungs: Clear to auscultation  Cardiovascular   Auscultation of heart: Normal rate and rhythm, normal S1 and S2, without murmurs  Antalgic gait  MOd swelling of the L foot with warmth and mild redness of the outer part of the foot only  This swelling extends to mid calf area and pt is also tender to palpation in mid to lower calf  No TTP in upper part of the calf or along popliteal vein  Message  Return to work or school:   Teresita Christian is under my professional care   He was seen in my office on 02/04/2016   He is able to return to work on  02/08/2016            Signatures   Electronically signed by : YOLIS Pierre ; Feb 4 2016  2:43PM EST                       (Author)

## 2018-01-18 NOTE — MISCELLANEOUS
Message  Return to work or school:   Nella Choi is under my professional care   He was seen in my office on 02/04/2016   He is able to return to work on  02/08/2016            Signatures   Electronically signed by : YOLIS Ivy ; Feb 4 2016  2:43PM EST                       (Author)

## 2018-01-22 VITALS
HEART RATE: 84 BPM | DIASTOLIC BLOOD PRESSURE: 78 MMHG | BODY MASS INDEX: 40.43 KG/M2 | HEIGHT: 74 IN | RESPIRATION RATE: 16 BRPM | WEIGHT: 315 LBS | TEMPERATURE: 98 F | SYSTOLIC BLOOD PRESSURE: 120 MMHG

## 2018-01-23 VITALS
BODY MASS INDEX: 40.43 KG/M2 | TEMPERATURE: 98.7 F | SYSTOLIC BLOOD PRESSURE: 136 MMHG | DIASTOLIC BLOOD PRESSURE: 84 MMHG | WEIGHT: 315 LBS | HEART RATE: 76 BPM | HEIGHT: 74 IN | RESPIRATION RATE: 16 BRPM

## 2018-01-23 NOTE — MISCELLANEOUS
Message  Return to work or school:   Zachary Campa is under my professional care  He was seen in my office on 12/6/17       Please excuse 12/6/17 and 12/7/17          Signatures   Electronically signed by : Haylee Stanley, ; Dec  6 2017  1:34PM EST                       (Author)    Electronically signed by : Haylee Stanley, ; Dec  6 2017  1:53PM EST                       (Author)

## 2018-03-01 ENCOUNTER — OFFICE VISIT (OUTPATIENT)
Dept: URGENT CARE | Facility: MEDICAL CENTER | Age: 48
End: 2018-03-01
Payer: COMMERCIAL

## 2018-03-01 VITALS
TEMPERATURE: 100.2 F | SYSTOLIC BLOOD PRESSURE: 119 MMHG | BODY MASS INDEX: 40.43 KG/M2 | HEIGHT: 74 IN | DIASTOLIC BLOOD PRESSURE: 67 MMHG | RESPIRATION RATE: 18 BRPM | OXYGEN SATURATION: 95 % | WEIGHT: 315 LBS | HEART RATE: 92 BPM

## 2018-03-01 DIAGNOSIS — J01.90 ACUTE NON-RECURRENT SINUSITIS, UNSPECIFIED LOCATION: Primary | ICD-10-CM

## 2018-03-01 DIAGNOSIS — R05.9 COUGH: ICD-10-CM

## 2018-03-01 PROCEDURE — 99204 OFFICE O/P NEW MOD 45 MIN: CPT | Performed by: NURSE PRACTITIONER

## 2018-03-01 RX ORDER — FLUTICASONE PROPIONATE 50 MCG
1 SPRAY, SUSPENSION (ML) NASAL DAILY
Qty: 16 G | Refills: 0 | Status: SHIPPED | OUTPATIENT
Start: 2018-03-01 | End: 2018-03-01 | Stop reason: ALTCHOICE

## 2018-03-01 RX ORDER — BENZONATATE 100 MG/1
100 CAPSULE ORAL 3 TIMES DAILY PRN
Qty: 20 CAPSULE | Refills: 0 | Status: SHIPPED | OUTPATIENT
Start: 2018-03-01 | End: 2018-03-30

## 2018-03-01 RX ORDER — AMOXICILLIN AND CLAVULANATE POTASSIUM 875; 125 MG/1; MG/1
1 TABLET, FILM COATED ORAL EVERY 12 HOURS SCHEDULED
Qty: 20 TABLET | Refills: 0 | Status: SHIPPED | OUTPATIENT
Start: 2018-03-01 | End: 2018-03-11

## 2018-03-01 NOTE — PROGRESS NOTES
Assessment/Plan:    No problem-specific Assessment & Plan notes found for this encounter  {Assess/PlanSmartLinks:36846}      Subjective:      Patient ID: Nabor Guy is a 52 y o  male  51 y/o male presents with dry cough and sinus headaches x 7 days  Pt states his sinus headaches improved and then got worse, worse with leaning forward  Pt also c/o fever that started last niht, T max of 101, facial pressure, facial congestion, chest pain with cough,   Pt denies chills, body aches, SOB, ear pain, N/V/D, abd pain  Pt states he has decreased appetite, but has been drinking a lot of water  Pt has been using OTC cough and cold  Denies hx asthma, COPD  Non smoker   Pt had pneumonia 10 years ago, sinusitis- gets multiple times per year  Son has cough, wife has URI  {Common ambulatory SmartLinks:43171}    Review of Systems      Objective: There were no vitals taken for this visit  Physical Exam   Constitutional: He is oriented to person, place, and time  He appears well-developed and well-nourished  HENT:   Head: Normocephalic and atraumatic  Right Ear: Tympanic membrane and external ear normal    Left Ear: Tympanic membrane and external ear normal    Nose: Mucosal edema present  Right sinus exhibits frontal sinus tenderness  Right sinus exhibits no maxillary sinus tenderness  Left sinus exhibits frontal sinus tenderness  Left sinus exhibits no maxillary sinus tenderness  Mouth/Throat: Uvula is midline  No oropharyngeal exudate, posterior oropharyngeal edema or posterior oropharyngeal erythema  Eyes: Pupils are equal, round, and reactive to light  Neck: Normal range of motion  Cardiovascular: Normal rate, regular rhythm, S1 normal, S2 normal and normal heart sounds  Exam reveals no gallop, no S3, no S4, no distant heart sounds and no friction rub  No murmur heard  Pulmonary/Chest: Effort normal  No respiratory distress  He has no wheezes  He has no rales  Neurological: He is alert and oriented to person, place, and time

## 2018-03-01 NOTE — PROGRESS NOTES
[facility]        NAME: Quin Wade is a 52 y o  male  : 1970    MRN: [id number]  DATE: 2018  TIME: 11:20 AM    Assessment and Plan   Acute non-recurrent sinusitis, unspecified location [J01 90]  1  Acute non-recurrent sinusitis, unspecified location  amoxicillin-clavulanate (AUGMENTIN) 875-125 mg per tablet    DISCONTINUED: fluticasone (FLONASE) 50 mcg/act nasal spray   2  Cough  benzonatate (TESSALON PERLES) 100 mg capsule         Patient Instructions     May alternate Tylenol and Ibuprofen as needed  Encourage fluids and rest    Saline nasal spray as needed  Humidify bedroom  Salt water gargles  Chloraseptic spray and lozenges as needed  Complete course of antibiotics as directed  Follow up with PCP in 5-7 days  Proceed to  ER if symptoms worsen  Chief Complaint     Chief Complaint   Patient presents with    Cough         History of Present Illness       51 y/o male presents with dry cough and sinus headaches x 7 days  Pt states his sinus headaches improved and then got worse, worse with leaning forward  Pt also c/o fever that started last niht, T max of 101, facial pressure, facial congestion, chest pain with cough,   Pt denies chills, body aches, SOB, ear pain, N/V/D, abd pain  Pt states he has decreased appetite, but has been drinking a lot of water  Pt has been using OTC cough and cold  Denies hx asthma, COPD  Non smoker   Pt had pneumonia 10 years ago, sinusitis- gets multiple times per year  Son has cough, wife has URI  *patient presents with cough and headache x 7 days  Reports has progressively been getting worse, febrile last evening,  Has been taking motrin with improvement of symptoms  Denies chills, body aches, n/V/D  No history of Asthma  Nonsmoker  Utilizing otc cough/cold meds with no improvement  Chronic sinusitis yearly  Review of Systems   Review of Systems   Constitutional: Positive for fever  Negative for chills and fatigue  HENT: Positive for congestion, rhinorrhea, sinus pain and sinus pressure  Negative for ear pain, sore throat and trouble swallowing  Respiratory: Positive for cough  Negative for chest tightness, shortness of breath and wheezing  Cardiovascular: Negative  Gastrointestinal: Negative  Musculoskeletal: Negative for myalgias  Skin: Negative for rash  Current Medications       Current Outpatient Prescriptions:     amLODIPine (NORVASC) 5 mg tablet, Take 5 mg by mouth daily  , Disp: , Rfl:     amoxicillin-clavulanate (AUGMENTIN) 875-125 mg per tablet, Take 1 tablet by mouth every 12 (twelve) hours for 10 days, Disp: 20 tablet, Rfl: 0    benzonatate (TESSALON PERLES) 100 mg capsule, Take 1 capsule (100 mg total) by mouth 3 (three) times a day as needed for cough, Disp: 20 capsule, Rfl: 0    cyclobenzaprine (FLEXERIL) 10 mg tablet, Take by mouth 3 (three) times a day as needed for muscle spasms  , Disp: , Rfl:     enalapril (VASOTEC) 20 mg tablet, Take by mouth daily  , Disp: , Rfl:     Current Allergies     Allergies as of 03/01/2018    (No Known Allergies)            The following portions of the patient's history were reviewed and updated as appropriate: allergies, current medications, past family history, past medical history, past social history, past surgical history and problem list      Past Medical History:   Diagnosis Date    Arthritis     Gout     Hypertension        History reviewed  No pertinent surgical history  History reviewed  No pertinent family history  Medications have been verified  Objective   /67   Pulse 92   Temp 100 2 °F (37 9 °C)   Resp 18   Ht 6' 2" (1 88 m)   Wt (!) 159 kg (351 lb 9 6 oz)   SpO2 95%   BMI 45 14 kg/m²        Physical Exam     Physical Exam   Constitutional: He is oriented to person, place, and time  Vital signs are normal  He appears well-developed and well-nourished  He is cooperative  Non-toxic appearance   He does not have a sickly appearance  He appears ill  No distress  HENT:   Head: Normocephalic and atraumatic  Right Ear: Hearing, tympanic membrane, external ear and ear canal normal    Left Ear: Hearing, tympanic membrane, external ear and ear canal normal    Nose: Mucosal edema, rhinorrhea and sinus tenderness present  Right sinus exhibits frontal sinus tenderness  Right sinus exhibits no maxillary sinus tenderness  Left sinus exhibits frontal sinus tenderness  Left sinus exhibits no maxillary sinus tenderness  Mouth/Throat: Uvula is midline, oropharynx is clear and moist and mucous membranes are normal  Normal dentition  No oropharyngeal exudate  Eyes: Conjunctivae, EOM and lids are normal  Pupils are equal, round, and reactive to light  Right eye exhibits no discharge  Left eye exhibits no discharge  Neck: Trachea normal and normal range of motion  No thyroid mass and no thyromegaly present  Cardiovascular: Normal rate, regular rhythm, S1 normal, S2 normal, normal heart sounds, intact distal pulses and normal pulses  Exam reveals no gallop and no friction rub  No murmur heard  Pulmonary/Chest: Effort normal and breath sounds normal  No respiratory distress  He has no wheezes  He has no rhonchi  He has no rales  He exhibits no tenderness  Musculoskeletal: Normal range of motion  He exhibits no edema  Lymphadenopathy:     He has no cervical adenopathy  Neurological: He is alert and oriented to person, place, and time  Skin: Skin is warm and dry  No rash noted  He is not diaphoretic  Psychiatric: He has a normal mood and affect  His behavior is normal  Thought content normal    Nursing note and vitals reviewed  I have reviewed the student's history and physical, I have personally examined the patient and agree with the above stated findings and plan

## 2018-03-01 NOTE — PATIENT INSTRUCTIONS
May alternate Tylenol and Ibuprofen as needed  Encourage fluids and rest    Saline nasal spray as needed  Humidify bedroom  Salt water gargles  Chloraseptic spray and lozenges as needed  Complete course of antibiotics as directed  F/U with PCP if symptoms persist/worsen or go to nearest emergency department if any signs of distress  Rhinosinusitis   AMBULATORY CARE:   Rhinosinusitis (RS)  is inflammation of your nose and sinuses  It commonly begins as a virus, often as a common cold  Viruses usually last 7 to 10 days and do not need treatment  When the virus does not get better on its own, you may have bacterial RS  This means that bacteria have begun to grow inside your sinuses  Acute RS lasts less than 4 weeks  Chronic RS lasts 12 weeks or more  Recurrent RS is when you have 4 or more episodes of RS in one year  Your signs and symptoms  may be worse when you lie on your back or try to sleep  You may have any of the following:  · Stuffy nose and reduced sense of smell     · Runny nose with thick yellow or green mucus     · Pressure or pain on your face or a headache     · Pain in your teeth or bad breath     · Ear pain or pressure     · Fever or cough     · Tiredness  Seek care immediately if:   · You have double vision or you cannot see  · You have a stiff neck, a fever, or a bad headache  · Your eyeball bulges out or you cannot move your eye  · Your eye and eyelid are red, swollen, and painful  · You cannot open your eye  · You are more sleepy than normal, or you notice changes in your ability to think, move, or talk  · You have swelling of your forehead or scalp  Contact your healthcare provider if:   · Your symptoms are worse or do not improve after 3 to 5 days of treatment  · You have questions or concerns about your condition or care  Treatment for rhinosinusitis  may include any of the following:  · Acetaminophen  decreases pain and fever   It is available without a doctor's order  Ask how much to take and how often to take it  Follow directions  Acetaminophen can cause liver damage if not taken correctly  · NSAIDs , such as ibuprofen, help decrease swelling, pain, and fever  This medicine is available with or without a doctor's order  NSAIDs can cause stomach bleeding or kidney problems in certain people  If you take blood thinner medicine, always ask your healthcare provider if NSAIDs are safe for you  Always read the medicine label and follow directions  · Nasal steroid sprays  decrease inflammation in your nose and sinuses  · Decongestants  reduce swelling and drain mucus in the nose and sinuses  They may help you breathe easier  · Antihistamines  dry mucus in the nose and relieve sneezing  · Antibiotics  treat a bacterial infection and may be needed if your symptoms do not improve or they get worse  · Take your medicine as directed  Contact your healthcare provider if you think your medicine is not helping or if you have side effects  Tell him or her if you are allergic to any medicine  Keep a list of the medicines, vitamins, and herbs you take  Include the amounts, and when and why you take them  Bring the list or the pill bottles to follow-up visits  Carry your medicine list with you in case of an emergency  Self-care:   · Rinse your sinuses  Use a sinus rinse device to rinse your nasal passages with a saline (salt water) solution  This will help thin the mucus in your nose and rinse away pollen and dirt  It will also help reduce swelling so you can breathe normally  Ask your healthcare provider how often to do this  · Breathe in steam   Heat a bowl of water until you see steam  Lean over the bowl and make a tent over your head with a large towel  Breathe deeply for about 20 minutes  Be careful not to get too close to the steam or burn yourself  Do this 3 times a day  You can also breathe deeply when you take a hot shower  · Sleep with your head elevated  Place an extra pillow under your head before you go to sleep to help your sinuses drain  · Drink liquids as directed  Ask your healthcare provider how much liquid to drink each day and which liquids are best for you  Liquids will thin the mucus in your nose and help it drain  Avoid drinks that contain alcohol or caffeine  · Do not smoke, and avoid secondhand smoke  Nicotine and other chemicals in cigarettes and cigars can make your symptoms worse  Ask your healthcare provider for information if you currently smoke and need help to quit  E-cigarettes or smokeless tobacco still contain nicotine  Talk to your healthcare provider before you use these products  Follow up with your healthcare provider as directed: Follow up if your symptoms are worse or not better after 3 to 5 days of treatment  Write down your questions so you remember to ask them during your visits  © 2017 [employer] Information is for End User's use only and may not be sold, redistributed or otherwise used for commercial purposes  All illustrations and images included in CareNotes® are the copyrighted property of [employer]  or [employer]  The above information is an  only  It is not intended as medical advice for individual conditions or treatments  Talk to your doctor, nurse or pharmacist before following any medical regimen to see if it is safe and effective for you

## 2018-03-16 DIAGNOSIS — M54.6 THORACIC BACK PAIN, UNSPECIFIED BACK PAIN LATERALITY, UNSPECIFIED CHRONICITY: ICD-10-CM

## 2018-03-16 DIAGNOSIS — I10 ESSENTIAL HYPERTENSION: Primary | ICD-10-CM

## 2018-03-16 RX ORDER — ENALAPRIL MALEATE 20 MG/1
20 TABLET ORAL DAILY
Qty: 30 TABLET | Refills: 0 | Status: SHIPPED | OUTPATIENT
Start: 2018-03-16 | End: 2018-03-30 | Stop reason: SDUPTHER

## 2018-03-16 RX ORDER — AMLODIPINE BESYLATE 5 MG/1
5 TABLET ORAL DAILY
Qty: 30 TABLET | Refills: 0 | Status: SHIPPED | OUTPATIENT
Start: 2018-03-16 | End: 2018-03-30 | Stop reason: SDUPTHER

## 2018-03-16 RX ORDER — CYCLOBENZAPRINE HCL 10 MG
10 TABLET ORAL 3 TIMES DAILY PRN
Qty: 30 TABLET | Refills: 0 | Status: SHIPPED | OUTPATIENT
Start: 2018-03-16 | End: 2018-03-30 | Stop reason: SDUPTHER

## 2018-03-24 PROBLEM — M25.522 LEFT ELBOW PAIN: Status: ACTIVE | Noted: 2017-07-15

## 2018-03-30 ENCOUNTER — OFFICE VISIT (OUTPATIENT)
Dept: FAMILY MEDICINE CLINIC | Facility: CLINIC | Age: 48
End: 2018-03-30
Payer: COMMERCIAL

## 2018-03-30 VITALS
RESPIRATION RATE: 16 BRPM | SYSTOLIC BLOOD PRESSURE: 120 MMHG | HEIGHT: 74 IN | WEIGHT: 315 LBS | TEMPERATURE: 97.4 F | HEART RATE: 72 BPM | DIASTOLIC BLOOD PRESSURE: 78 MMHG | BODY MASS INDEX: 40.43 KG/M2

## 2018-03-30 DIAGNOSIS — I10 ESSENTIAL HYPERTENSION: ICD-10-CM

## 2018-03-30 DIAGNOSIS — M15.9 PRIMARY OSTEOARTHRITIS INVOLVING MULTIPLE JOINTS: Primary | ICD-10-CM

## 2018-03-30 DIAGNOSIS — M54.6 THORACIC BACK PAIN, UNSPECIFIED BACK PAIN LATERALITY, UNSPECIFIED CHRONICITY: ICD-10-CM

## 2018-03-30 PROCEDURE — 99213 OFFICE O/P EST LOW 20 MIN: CPT | Performed by: FAMILY MEDICINE

## 2018-03-30 PROCEDURE — 3074F SYST BP LT 130 MM HG: CPT | Performed by: FAMILY MEDICINE

## 2018-03-30 PROCEDURE — 3078F DIAST BP <80 MM HG: CPT | Performed by: FAMILY MEDICINE

## 2018-03-30 PROCEDURE — 3008F BODY MASS INDEX DOCD: CPT | Performed by: FAMILY MEDICINE

## 2018-03-30 RX ORDER — AMLODIPINE BESYLATE 5 MG/1
5 TABLET ORAL DAILY
Qty: 30 TABLET | Refills: 5 | Status: SHIPPED | OUTPATIENT
Start: 2018-03-30 | End: 2018-10-11 | Stop reason: SDUPTHER

## 2018-03-30 RX ORDER — MELOXICAM 15 MG/1
15 TABLET ORAL DAILY
Qty: 30 TABLET | Refills: 2 | Status: SHIPPED | OUTPATIENT
Start: 2018-03-30 | End: 2019-08-20

## 2018-03-30 RX ORDER — COLCHICINE 0.6 MG/1
TABLET ORAL 2 TIMES DAILY
COMMUNITY
Start: 2017-12-06 | End: 2018-12-10 | Stop reason: SDUPTHER

## 2018-03-30 RX ORDER — ENALAPRIL MALEATE 20 MG/1
20 TABLET ORAL DAILY
Qty: 30 TABLET | Refills: 5 | Status: SHIPPED | OUTPATIENT
Start: 2018-03-30 | End: 2018-10-11 | Stop reason: SDUPTHER

## 2018-03-30 RX ORDER — CYCLOBENZAPRINE HCL 10 MG
10 TABLET ORAL
Qty: 30 TABLET | Refills: 0 | Status: SHIPPED | OUTPATIENT
Start: 2018-03-30 | End: 2018-07-11 | Stop reason: SDUPTHER

## 2018-03-30 NOTE — PATIENT INSTRUCTIONS
Start meloxicam 15 mg 1 daily with food and avoid ibuprofen and naproxen  May take Tylenol as necessary    Referral to Dr Power Singh, rheumatologist

## 2018-03-30 NOTE — PROGRESS NOTES
Assessment/Plan:  Discussed diagnostic and treatment options with patient  Patient will be started on meloxicam 15 mg 1 daily with food and instructed to discontinue Motrin and avoid naproxen  Patient may take Tylenol p r n  Jose [facility] Side Patient is being referred to Dr Kurt Rainey, rheumatologist for further evaluation and treatment  Return to the office p r n  No problem-specific Assessment & Plan notes found for this encounter  Diagnoses and all orders for this visit:    Primary osteoarthritis involving multiple joints  Comments:  Mobic 15 mg 1 daily with food  Referral to Rheumatology  Orders:  -     meloxicam (MOBIC) 15 mg tablet; Take 1 tablet (15 mg total) by mouth daily  -     Ambulatory referral to Rheumatology; Future    Essential hypertension  Comments:  BP well controlled  Continue present treatment  Orders:  -     amLODIPine (NORVASC) 5 mg tablet; Take 1 tablet (5 mg total) by mouth daily  -     enalapril (VASOTEC) 20 mg tablet; Take 1 tablet (20 mg total) by mouth daily    Thoracic back pain, unspecified back pain laterality, unspecified chronicity  -     cyclobenzaprine (FLEXERIL) 10 mg tablet; Take 1 tablet (10 mg total) by mouth daily at bedtime    Other orders  -     colchicine (COLCRYS) 0 6 mg tablet; Take by mouth Twice daily          Subjective:      Patient ID: Santiago Shankar is a 52 y o  male  Patient complains of worsening arthritis symptoms  He admits to arthritis in his knees and ankles for a long time and recently complains of right hand arthritic pains  He denies any specific injury or flare-up of gout recently  Patient has been treating this with Tylenol and Motrin with some relief  Arthritis   Presents for follow-up visit  He complains of pain, stiffness and joint swelling  He reports no joint warmth  The symptoms have been worsening  Affected locations include the right wrist, right MCP, left knee, right knee, left ankle and right ankle   Associated symptoms include pain at night and pain while resting  Pertinent negatives include no dry eyes, dry mouth, fatigue, fever, rash or weight loss  The following portions of the patient's history were reviewed and updated as appropriate: allergies, current medications, past family history, past medical history, past social history, past surgical history and problem list     Review of Systems   Constitutional: Negative for fatigue, fever and weight loss  Musculoskeletal: Positive for arthritis, joint swelling and stiffness  Skin: Negative for rash  Objective:      /78   Pulse 72   Temp (!) 97 4 °F (36 3 °C)   Resp 16   Ht 6' 2" (1 88 m)   Wt (!) 149 kg (329 lb)   BMI 42 24 kg/m²          Physical Exam   Constitutional: He is oriented to person, place, and time  He appears well-developed and well-nourished  No distress  HENT:   Head: Normocephalic  Mouth/Throat: Oropharynx is clear and moist    Eyes: Conjunctivae are normal  No scleral icterus  Neck: Neck supple  Cardiovascular: Normal rate and regular rhythm  Pulmonary/Chest: Effort normal and breath sounds normal    Abdominal: Soft  There is no tenderness  Musculoskeletal: He exhibits tenderness  He exhibits no edema  Knees range of motion intact  Left knee positive joint tenderness  Ankles range of motion intact and nontender  Right hand positive arthritic changes and tenderness  Lymphadenopathy:     He has no cervical adenopathy  Neurological: He is alert and oriented to person, place, and time  Skin: Skin is warm and dry  Psychiatric: He has a normal mood and affect

## 2018-07-11 DIAGNOSIS — M54.6 THORACIC BACK PAIN, UNSPECIFIED BACK PAIN LATERALITY, UNSPECIFIED CHRONICITY: ICD-10-CM

## 2018-07-11 RX ORDER — CYCLOBENZAPRINE HCL 10 MG
TABLET ORAL
Qty: 30 TABLET | Refills: 0 | Status: SHIPPED | OUTPATIENT
Start: 2018-07-11 | End: 2018-10-11 | Stop reason: SDUPTHER

## 2018-10-11 DIAGNOSIS — I10 ESSENTIAL HYPERTENSION: ICD-10-CM

## 2018-10-11 DIAGNOSIS — M54.6 THORACIC BACK PAIN, UNSPECIFIED BACK PAIN LATERALITY, UNSPECIFIED CHRONICITY: ICD-10-CM

## 2018-10-11 RX ORDER — AMLODIPINE BESYLATE 5 MG/1
TABLET ORAL
Qty: 30 TABLET | Refills: 4 | Status: SHIPPED | OUTPATIENT
Start: 2018-10-11 | End: 2019-03-08 | Stop reason: SDUPTHER

## 2018-10-11 RX ORDER — ENALAPRIL MALEATE 20 MG/1
TABLET ORAL
Qty: 30 TABLET | Refills: 4 | Status: SHIPPED | OUTPATIENT
Start: 2018-10-11 | End: 2019-03-08 | Stop reason: SDUPTHER

## 2018-10-11 RX ORDER — CYCLOBENZAPRINE HCL 10 MG
TABLET ORAL
Qty: 30 TABLET | Refills: 0 | Status: SHIPPED | OUTPATIENT
Start: 2018-10-11 | End: 2018-11-23 | Stop reason: SDUPTHER

## 2018-11-23 ENCOUNTER — OFFICE VISIT (OUTPATIENT)
Dept: FAMILY MEDICINE CLINIC | Facility: CLINIC | Age: 48
End: 2018-11-23
Payer: COMMERCIAL

## 2018-11-23 VITALS
OXYGEN SATURATION: 98 % | RESPIRATION RATE: 16 BRPM | HEIGHT: 74 IN | WEIGHT: 315 LBS | HEART RATE: 72 BPM | DIASTOLIC BLOOD PRESSURE: 74 MMHG | SYSTOLIC BLOOD PRESSURE: 110 MMHG | BODY MASS INDEX: 40.43 KG/M2 | TEMPERATURE: 97.8 F

## 2018-11-23 DIAGNOSIS — M54.6 THORACIC BACK PAIN, UNSPECIFIED BACK PAIN LATERALITY, UNSPECIFIED CHRONICITY: ICD-10-CM

## 2018-11-23 DIAGNOSIS — E55.9 VITAMIN D DEFICIENCY: ICD-10-CM

## 2018-11-23 DIAGNOSIS — Z00.00 ANNUAL PHYSICAL EXAM: Primary | ICD-10-CM

## 2018-11-23 DIAGNOSIS — I10 ESSENTIAL HYPERTENSION: ICD-10-CM

## 2018-11-23 DIAGNOSIS — Z12.5 SCREENING PSA (PROSTATE SPECIFIC ANTIGEN): ICD-10-CM

## 2018-11-23 DIAGNOSIS — Z00.00 HEALTHCARE MAINTENANCE: ICD-10-CM

## 2018-11-23 DIAGNOSIS — E66.01 MORBID OBESITY (HCC): ICD-10-CM

## 2018-11-23 LAB
25(OH)D3 SERPL-MCNC: 17.6 NG/ML (ref 30–100)
ALBUMIN SERPL BCP-MCNC: 3.6 G/DL (ref 3.5–5)
ALP SERPL-CCNC: 80 U/L (ref 46–116)
ALT SERPL W P-5'-P-CCNC: 46 U/L (ref 12–78)
ANION GAP SERPL CALCULATED.3IONS-SCNC: 7 MMOL/L (ref 4–13)
AST SERPL W P-5'-P-CCNC: 32 U/L (ref 5–45)
BACTERIA UR QL AUTO: NORMAL /HPF
BILIRUB SERPL-MCNC: 0.4 MG/DL (ref 0.2–1)
BILIRUB UR QL STRIP: NEGATIVE
BUN SERPL-MCNC: 15 MG/DL (ref 5–25)
CALCIUM SERPL-MCNC: 9.2 MG/DL (ref 8.3–10.1)
CHLORIDE SERPL-SCNC: 102 MMOL/L (ref 100–108)
CHOLEST SERPL-MCNC: 146 MG/DL (ref 50–200)
CLARITY UR: CLEAR
CO2 SERPL-SCNC: 26 MMOL/L (ref 21–32)
COLOR UR: YELLOW
CREAT SERPL-MCNC: 0.71 MG/DL (ref 0.6–1.3)
GFR SERPL CREATININE-BSD FRML MDRD: 111 ML/MIN/1.73SQ M
GLUCOSE P FAST SERPL-MCNC: 91 MG/DL (ref 65–99)
GLUCOSE UR STRIP-MCNC: NEGATIVE MG/DL
HDLC SERPL-MCNC: 36 MG/DL (ref 40–60)
HGB UR QL STRIP.AUTO: NEGATIVE
HYALINE CASTS #/AREA URNS LPF: NORMAL /LPF
KETONES UR STRIP-MCNC: NEGATIVE MG/DL
LDLC SERPL CALC-MCNC: 87 MG/DL (ref 0–100)
LEUKOCYTE ESTERASE UR QL STRIP: ABNORMAL
NITRITE UR QL STRIP: NEGATIVE
NON-SQ EPI CELLS URNS QL MICRO: NORMAL /HPF
NONHDLC SERPL-MCNC: 110 MG/DL
PH UR STRIP.AUTO: 5.5 [PH] (ref 4.5–8)
POTASSIUM SERPL-SCNC: 4.6 MMOL/L (ref 3.5–5.3)
PROT SERPL-MCNC: 8.1 G/DL (ref 6.4–8.2)
PROT UR STRIP-MCNC: NEGATIVE MG/DL
PSA SERPL-MCNC: 0.6 NG/ML (ref 0–4)
RBC #/AREA URNS AUTO: NORMAL /HPF
SODIUM SERPL-SCNC: 135 MMOL/L (ref 136–145)
SP GR UR STRIP.AUTO: 1.02 (ref 1–1.03)
TRIGL SERPL-MCNC: 117 MG/DL
TSH SERPL DL<=0.05 MIU/L-ACNC: 1.43 UIU/ML (ref 0.36–3.74)
UROBILINOGEN UR QL STRIP.AUTO: 1 E.U./DL
WBC #/AREA URNS AUTO: NORMAL /HPF

## 2018-11-23 PROCEDURE — 36415 COLL VENOUS BLD VENIPUNCTURE: CPT | Performed by: FAMILY MEDICINE

## 2018-11-23 PROCEDURE — 80053 COMPREHEN METABOLIC PANEL: CPT | Performed by: FAMILY MEDICINE

## 2018-11-23 PROCEDURE — G0103 PSA SCREENING: HCPCS | Performed by: FAMILY MEDICINE

## 2018-11-23 PROCEDURE — 99396 PREV VISIT EST AGE 40-64: CPT | Performed by: FAMILY MEDICINE

## 2018-11-23 PROCEDURE — 82306 VITAMIN D 25 HYDROXY: CPT | Performed by: FAMILY MEDICINE

## 2018-11-23 PROCEDURE — 84443 ASSAY THYROID STIM HORMONE: CPT | Performed by: FAMILY MEDICINE

## 2018-11-23 PROCEDURE — 80061 LIPID PANEL: CPT | Performed by: FAMILY MEDICINE

## 2018-11-23 PROCEDURE — 81001 URINALYSIS AUTO W/SCOPE: CPT | Performed by: FAMILY MEDICINE

## 2018-11-23 RX ORDER — CYCLOBENZAPRINE HCL 10 MG
10 TABLET ORAL
Qty: 30 TABLET | Refills: 2 | Status: SHIPPED | OUTPATIENT
Start: 2018-11-23 | End: 2019-03-08 | Stop reason: SDUPTHER

## 2018-11-23 NOTE — PROGRESS NOTES
Assessment/Plan:    Patient declines flu vaccine  Fasting labs drawn as below  Continue present treatment  Patient instructed to follow a low-fat, low-salt and a low sugar/carbohydrate diet and get regular exercise walking as tolerated  Weight loss encouraged  Return to the office in 1 year  Diagnoses and all orders for this visit:    Annual physical exam    Thoracic back pain, unspecified back pain laterality, unspecified chronicity  -     cyclobenzaprine (FLEXERIL) 10 mg tablet; Take 1 tablet (10 mg total) by mouth daily at bedtime    Essential hypertension  -     CBC and Platelet  -     Comprehensive metabolic panel  -     Lipid panel  -     TSH, 3rd generation with Free T4 reflex  -     UA (URINE) with reflex to Microscopic    Vitamin D deficiency  -     Vitamin D 25 hydroxy    Morbid obesity (HCC)    Screening PSA (prostate specific antigen)  -     PSA, Total Screen    Healthcare maintenance  -     CBC and Platelet  -     Comprehensive metabolic panel  -     Lipid panel          Subjective:      Patient ID: Kenya Torres is a 50 y o  male  Patient is here for annual physical exam and health screening profile including biometrics and fasting labs for his health insurance plan  Patient works as a   No regular exercise program although remains fairly active throughout the day  Patient has been feeling well overall  He declines flu vaccine  The following portions of the patient's history were reviewed and updated as appropriate: allergies, current medications, past family history, past medical history, past social history, past surgical history and problem list     Review of Systems   Constitutional: Negative for activity change, appetite change, fatigue and unexpected weight change  HENT: Negative  Eyes: Negative  Respiratory: Negative for cough, chest tightness, shortness of breath and wheezing  Cardiovascular: Negative for chest pain, palpitations and leg swelling  Gastrointestinal: Negative for abdominal pain, blood in stool, constipation, diarrhea, nausea and vomiting  Genitourinary: Negative for difficulty urinating, dysuria, frequency, hematuria and urgency  Musculoskeletal: Positive for arthralgias, joint swelling and myalgias  Negative for back pain, gait problem, neck pain and neck stiffness  Skin: Negative  Neurological: Negative for dizziness, syncope, weakness, light-headedness and headaches  Hematological: Negative for adenopathy  Does not bruise/bleed easily  Psychiatric/Behavioral: Negative for dysphoric mood and sleep disturbance  The patient is not nervous/anxious  Objective:      /74   Pulse 72   Temp 97 8 °F (36 6 °C)   Resp 16   Ht 6' 1 62" (1 87 m)   Wt (!) 154 kg (340 lb)   SpO2 98%   BMI 44 10 kg/m²          Physical Exam   Constitutional: He is oriented to person, place, and time  He appears well-developed and well-nourished  No distress  HENT:   Head: Normocephalic  Mouth/Throat: Oropharynx is clear and moist    Eyes: Conjunctivae are normal  No scleral icterus  Neck: Neck supple  No thyromegaly present  Cardiovascular: Normal rate and regular rhythm  Pulmonary/Chest: Effort normal and breath sounds normal    Abdominal: Soft  There is no tenderness  Musculoskeletal: He exhibits no edema  Lymphadenopathy:     He has no cervical adenopathy  Neurological: He is alert and oriented to person, place, and time  Skin: Skin is warm and dry  Psychiatric: He has a normal mood and affect

## 2018-12-10 DIAGNOSIS — M10.9 ACUTE GOUT, UNSPECIFIED CAUSE, UNSPECIFIED SITE: Primary | ICD-10-CM

## 2018-12-10 RX ORDER — COLCHICINE 0.6 MG/1
0.6 TABLET ORAL DAILY
Qty: 60 TABLET | Refills: 2 | Status: SHIPPED | OUTPATIENT
Start: 2018-12-10 | End: 2019-08-22 | Stop reason: HOSPADM

## 2019-03-08 DIAGNOSIS — I10 ESSENTIAL HYPERTENSION: ICD-10-CM

## 2019-03-08 DIAGNOSIS — M54.6 THORACIC BACK PAIN, UNSPECIFIED BACK PAIN LATERALITY, UNSPECIFIED CHRONICITY: ICD-10-CM

## 2019-03-08 RX ORDER — ENALAPRIL MALEATE 20 MG/1
20 TABLET ORAL DAILY
Qty: 30 TABLET | Refills: 4 | Status: SHIPPED | OUTPATIENT
Start: 2019-03-08 | End: 2019-08-07 | Stop reason: SDUPTHER

## 2019-03-08 RX ORDER — CYCLOBENZAPRINE HCL 10 MG
10 TABLET ORAL
Qty: 30 TABLET | Refills: 2 | Status: SHIPPED | OUTPATIENT
Start: 2019-03-08 | End: 2019-06-10 | Stop reason: SDUPTHER

## 2019-03-08 RX ORDER — AMLODIPINE BESYLATE 5 MG/1
5 TABLET ORAL DAILY
Qty: 30 TABLET | Refills: 4 | Status: SHIPPED | OUTPATIENT
Start: 2019-03-08 | End: 2019-08-07 | Stop reason: SDUPTHER

## 2019-06-10 DIAGNOSIS — M54.6 THORACIC BACK PAIN, UNSPECIFIED BACK PAIN LATERALITY, UNSPECIFIED CHRONICITY: ICD-10-CM

## 2019-06-10 RX ORDER — CYCLOBENZAPRINE HCL 10 MG
TABLET ORAL
Qty: 30 TABLET | Refills: 1 | Status: SHIPPED | OUTPATIENT
Start: 2019-06-10 | End: 2019-08-07 | Stop reason: SDUPTHER

## 2019-08-07 DIAGNOSIS — M54.6 THORACIC BACK PAIN, UNSPECIFIED BACK PAIN LATERALITY, UNSPECIFIED CHRONICITY: ICD-10-CM

## 2019-08-07 DIAGNOSIS — I10 ESSENTIAL HYPERTENSION: ICD-10-CM

## 2019-08-07 RX ORDER — CYCLOBENZAPRINE HCL 10 MG
10 TABLET ORAL
Qty: 30 TABLET | Refills: 5 | Status: SHIPPED | OUTPATIENT
Start: 2019-08-07 | End: 2020-02-03 | Stop reason: SDUPTHER

## 2019-08-07 RX ORDER — ENALAPRIL MALEATE 20 MG/1
20 TABLET ORAL DAILY
Qty: 30 TABLET | Refills: 5 | Status: SHIPPED | OUTPATIENT
Start: 2019-08-07 | End: 2020-02-03 | Stop reason: SDUPTHER

## 2019-08-07 RX ORDER — AMLODIPINE BESYLATE 5 MG/1
5 TABLET ORAL DAILY
Qty: 30 TABLET | Refills: 5 | Status: SHIPPED | OUTPATIENT
Start: 2019-08-07 | End: 2020-02-03 | Stop reason: SDUPTHER

## 2019-08-19 ENCOUNTER — OFFICE VISIT (OUTPATIENT)
Dept: FAMILY MEDICINE CLINIC | Facility: CLINIC | Age: 49
End: 2019-08-19
Payer: COMMERCIAL

## 2019-08-19 VITALS
WEIGHT: 315 LBS | HEART RATE: 72 BPM | DIASTOLIC BLOOD PRESSURE: 82 MMHG | SYSTOLIC BLOOD PRESSURE: 132 MMHG | BODY MASS INDEX: 39.17 KG/M2 | HEIGHT: 75 IN | RESPIRATION RATE: 16 BRPM | TEMPERATURE: 97.6 F

## 2019-08-19 DIAGNOSIS — L03.113 CELLULITIS OF RIGHT UPPER EXTREMITY: Primary | ICD-10-CM

## 2019-08-19 PROCEDURE — 3008F BODY MASS INDEX DOCD: CPT | Performed by: FAMILY MEDICINE

## 2019-08-19 PROCEDURE — 99213 OFFICE O/P EST LOW 20 MIN: CPT | Performed by: FAMILY MEDICINE

## 2019-08-19 RX ORDER — CEPHALEXIN 500 MG/1
500 CAPSULE ORAL EVERY 6 HOURS SCHEDULED
Qty: 40 CAPSULE | Refills: 0 | Status: SHIPPED | OUTPATIENT
Start: 2019-08-19 | End: 2019-08-22 | Stop reason: HOSPADM

## 2019-08-19 RX ORDER — NAPROXEN 500 MG/1
500 TABLET ORAL 2 TIMES DAILY WITH MEALS
Qty: 30 TABLET | Refills: 1 | Status: SHIPPED | OUTPATIENT
Start: 2019-08-19 | End: 2019-10-18

## 2019-08-19 NOTE — PROGRESS NOTES
Assessment/Plan:  Discussed treatment options with patient  Patient was started on cephalexin 500 mg q 6 hours and naproxen 500 mg b i d  With food  Patient to avoid ibuprofen and meloxicam   He may take Tylenol p r n  Recommend patient apply ice alternating with moist heat for 20 minutes each 3-4 times daily and rest   Patient to remain out of work for the next 2 days  Return to the office in 1 week or call sooner p r n  Or report to the emergency room for worsening symptoms  Diagnoses and all orders for this visit:    Cellulitis of right upper extremity  -     cephalexin (KEFLEX) 500 mg capsule; Take 1 capsule (500 mg total) by mouth every 6 (six) hours for 10 days  -     naproxen (NAPROSYN) 500 mg tablet; Take 1 tablet (500 mg total) by mouth 2 (two) times a day with meals          Subjective:      Patient ID: Susan Youssef is a 50 y o  male  Patient awoke this morning with right arm pain, redness and swelling at the elbow  Patient denies any specific injury or fall  He denies fever  Patient states this is not feel like his gout  He has treated this with ibuprofen without significant relief  Patient has similar episode involving his left elbow 2 years ago requiring hospitalization for IV antibiotics  Patient is left handed   Arm Pain    The incident occurred 6 to 12 hours ago  There was no injury mechanism  The pain is present in the right elbow  The quality of the pain is described as burning and aching  The pain radiates to the right arm  The pain has been constant since the incident  Pertinent negatives include no numbness or tingling  The symptoms are aggravated by movement  He has tried NSAIDs for the symptoms  The treatment provided mild relief         The following portions of the patient's history were reviewed and updated as appropriate: allergies, current medications, past family history, past medical history, past social history, past surgical history and problem list     Review of Systems   Neurological: Negative for tingling and numbness  Objective:      /82 (BP Location: Left arm, Patient Position: Sitting, Cuff Size: Adult)   Pulse 72   Temp 97 6 °F (36 4 °C) (Oral)   Resp 16   Ht 6' 3 2" (1 91 m)   Wt (!) 153 kg (338 lb)   BMI 42 03 kg/m²          Physical Exam   Constitutional: He is oriented to person, place, and time  He appears well-developed and well-nourished  No distress  HENT:   Head: Normocephalic  Mouth/Throat: Oropharynx is clear and moist    Eyes: Conjunctivae are normal  No scleral icterus  Neck: Neck supple  Cardiovascular: Normal rate and regular rhythm  Pulmonary/Chest: Effort normal and breath sounds normal    Abdominal: Soft  There is no tenderness  Musculoskeletal: He exhibits tenderness  He exhibits no edema  Right elbow reveals decreased range of motion in flexion  Positive tenderness, erythema and mild swelling at the elbow  Lymphadenopathy:     He has no cervical adenopathy  Neurological: He is alert and oriented to person, place, and time  Skin: Skin is warm and dry  Psychiatric: He has a normal mood and affect

## 2019-08-19 NOTE — LETTER
August 19, 2019     Patient: Simon Kang   YOB: 1970   Date of Visit: 8/19/2019       To Whom it May Concern:    Simon Kang is under my professional care  He was seen in my office on 8/19/2019  He may return to work on 8/22/2019  If you have any questions or concerns, please don't hesitate to call           Sincerely,          Drew Wray DO        CC: No Recipients

## 2019-08-20 ENCOUNTER — HOSPITAL ENCOUNTER (INPATIENT)
Facility: HOSPITAL | Age: 49
LOS: 1 days | Discharge: HOME | DRG: 603 | End: 2019-08-22
Attending: EMERGENCY MEDICINE | Admitting: INTERNAL MEDICINE
Payer: COMMERCIAL

## 2019-08-20 ENCOUNTER — APPOINTMENT (EMERGENCY)
Dept: RADIOLOGY | Facility: HOSPITAL | Age: 49
DRG: 603 | End: 2019-08-20
Payer: COMMERCIAL

## 2019-08-20 ENCOUNTER — APPOINTMENT (EMERGENCY)
Dept: NON INVASIVE DIAGNOSTICS | Facility: HOSPITAL | Age: 49
DRG: 603 | End: 2019-08-20
Payer: COMMERCIAL

## 2019-08-20 DIAGNOSIS — M70.21 OLECRANON BURSITIS OF RIGHT ELBOW: ICD-10-CM

## 2019-08-20 DIAGNOSIS — L03.113 CELLULITIS OF RIGHT UPPER EXTREMITY: Primary | ICD-10-CM

## 2019-08-20 LAB
ANION GAP SERPL CALCULATED.3IONS-SCNC: 8 MMOL/L (ref 4–13)
BASOPHILS # BLD AUTO: 0.02 THOUSANDS/ΜL (ref 0–0.1)
BASOPHILS NFR BLD AUTO: 0 % (ref 0–1)
BUN SERPL-MCNC: 9 MG/DL (ref 5–25)
CALCIUM SERPL-MCNC: 9.1 MG/DL (ref 8.3–10.1)
CHLORIDE SERPL-SCNC: 101 MMOL/L (ref 100–108)
CO2 SERPL-SCNC: 27 MMOL/L (ref 21–32)
CREAT SERPL-MCNC: 0.7 MG/DL (ref 0.6–1.3)
EOSINOPHIL # BLD AUTO: 0.12 THOUSAND/ΜL (ref 0–0.61)
EOSINOPHIL NFR BLD AUTO: 2 % (ref 0–6)
ERYTHROCYTE [DISTWIDTH] IN BLOOD BY AUTOMATED COUNT: 13.9 % (ref 11.6–15.1)
GFR SERPL CREATININE-BSD FRML MDRD: 112 ML/MIN/1.73SQ M
GLUCOSE SERPL-MCNC: 97 MG/DL (ref 65–140)
HCT VFR BLD AUTO: 46.7 % (ref 36.5–49.3)
HGB BLD-MCNC: 15.2 G/DL (ref 12–17)
IMM GRANULOCYTES # BLD AUTO: 0.02 THOUSAND/UL (ref 0–0.2)
IMM GRANULOCYTES NFR BLD AUTO: 0 % (ref 0–2)
LYMPHOCYTES # BLD AUTO: 1.72 THOUSANDS/ΜL (ref 0.6–4.47)
LYMPHOCYTES NFR BLD AUTO: 23 % (ref 14–44)
MCH RBC QN AUTO: 30.2 PG (ref 26.8–34.3)
MCHC RBC AUTO-ENTMCNC: 32.5 G/DL (ref 31.4–37.4)
MCV RBC AUTO: 93 FL (ref 82–98)
MONOCYTES # BLD AUTO: 0.61 THOUSAND/ΜL (ref 0.17–1.22)
MONOCYTES NFR BLD AUTO: 8 % (ref 4–12)
NEUTROPHILS # BLD AUTO: 4.94 THOUSANDS/ΜL (ref 1.85–7.62)
NEUTS SEG NFR BLD AUTO: 67 % (ref 43–75)
NRBC BLD AUTO-RTO: 0 /100 WBCS
PLATELET # BLD AUTO: 218 THOUSANDS/UL (ref 149–390)
PMV BLD AUTO: 11 FL (ref 8.9–12.7)
POTASSIUM SERPL-SCNC: 4.3 MMOL/L (ref 3.5–5.3)
RBC # BLD AUTO: 5.03 MILLION/UL (ref 3.88–5.62)
SODIUM SERPL-SCNC: 136 MMOL/L (ref 136–145)
WBC # BLD AUTO: 7.43 THOUSAND/UL (ref 4.31–10.16)

## 2019-08-20 PROCEDURE — 99285 EMERGENCY DEPT VISIT HI MDM: CPT

## 2019-08-20 PROCEDURE — 93971 EXTREMITY STUDY: CPT

## 2019-08-20 PROCEDURE — 36415 COLL VENOUS BLD VENIPUNCTURE: CPT | Performed by: PHYSICIAN ASSISTANT

## 2019-08-20 PROCEDURE — 99285 EMERGENCY DEPT VISIT HI MDM: CPT | Performed by: PHYSICIAN ASSISTANT

## 2019-08-20 PROCEDURE — 96365 THER/PROPH/DIAG IV INF INIT: CPT

## 2019-08-20 PROCEDURE — 99220 PR INITIAL OBSERVATION CARE/DAY 70 MINUTES: CPT | Performed by: INTERNAL MEDICINE

## 2019-08-20 PROCEDURE — 80048 BASIC METABOLIC PNL TOTAL CA: CPT | Performed by: PHYSICIAN ASSISTANT

## 2019-08-20 PROCEDURE — 93971 EXTREMITY STUDY: CPT | Performed by: SURGERY

## 2019-08-20 PROCEDURE — 73070 X-RAY EXAM OF ELBOW: CPT

## 2019-08-20 PROCEDURE — 96375 TX/PRO/DX INJ NEW DRUG ADDON: CPT

## 2019-08-20 PROCEDURE — 85025 COMPLETE CBC W/AUTO DIFF WBC: CPT | Performed by: PHYSICIAN ASSISTANT

## 2019-08-20 PROCEDURE — 73110 X-RAY EXAM OF WRIST: CPT

## 2019-08-20 PROCEDURE — 87040 BLOOD CULTURE FOR BACTERIA: CPT | Performed by: PHYSICIAN ASSISTANT

## 2019-08-20 RX ORDER — ACETAMINOPHEN 325 MG/1
975 TABLET ORAL EVERY 8 HOURS SCHEDULED
Status: DISCONTINUED | OUTPATIENT
Start: 2019-08-20 | End: 2019-08-22 | Stop reason: HOSPADM

## 2019-08-20 RX ORDER — SODIUM CHLORIDE 9 MG/ML
1000 INJECTION, SOLUTION INTRAVENOUS CONTINUOUS
Status: DISCONTINUED | OUTPATIENT
Start: 2019-08-20 | End: 2019-08-20

## 2019-08-20 RX ORDER — LISINOPRIL 20 MG/1
40 TABLET ORAL DAILY
Status: DISCONTINUED | OUTPATIENT
Start: 2019-08-21 | End: 2019-08-22 | Stop reason: HOSPADM

## 2019-08-20 RX ORDER — AMLODIPINE BESYLATE 5 MG/1
5 TABLET ORAL DAILY
Status: DISCONTINUED | OUTPATIENT
Start: 2019-08-21 | End: 2019-08-22 | Stop reason: HOSPADM

## 2019-08-20 RX ORDER — CEFAZOLIN SODIUM 2 G/50ML
2000 SOLUTION INTRAVENOUS EVERY 8 HOURS
Status: DISCONTINUED | OUTPATIENT
Start: 2019-08-20 | End: 2019-08-22 | Stop reason: HOSPADM

## 2019-08-20 RX ORDER — KETOROLAC TROMETHAMINE 30 MG/ML
15 INJECTION, SOLUTION INTRAMUSCULAR; INTRAVENOUS EVERY 6 HOURS PRN
Status: DISCONTINUED | OUTPATIENT
Start: 2019-08-20 | End: 2019-08-21

## 2019-08-20 RX ORDER — KETOROLAC TROMETHAMINE 30 MG/ML
30 INJECTION, SOLUTION INTRAMUSCULAR; INTRAVENOUS ONCE
Status: COMPLETED | OUTPATIENT
Start: 2019-08-20 | End: 2019-08-20

## 2019-08-20 RX ORDER — IBUPROFEN 600 MG/1
600 TABLET ORAL EVERY 6 HOURS PRN
Status: DISCONTINUED | OUTPATIENT
Start: 2019-08-20 | End: 2019-08-22 | Stop reason: HOSPADM

## 2019-08-20 RX ORDER — CYCLOBENZAPRINE HCL 10 MG
10 TABLET ORAL
Status: DISCONTINUED | OUTPATIENT
Start: 2019-08-20 | End: 2019-08-22 | Stop reason: HOSPADM

## 2019-08-20 RX ADMIN — CEFTRIAXONE SODIUM 1000 MG: 10 INJECTION, POWDER, FOR SOLUTION INTRAVENOUS at 13:30

## 2019-08-20 RX ADMIN — KETOROLAC TROMETHAMINE 30 MG: 30 INJECTION, SOLUTION INTRAMUSCULAR at 13:28

## 2019-08-20 RX ADMIN — ACETAMINOPHEN 975 MG: 325 TABLET ORAL at 16:36

## 2019-08-20 RX ADMIN — CEFAZOLIN SODIUM 2000 MG: 2 SOLUTION INTRAVENOUS at 20:18

## 2019-08-20 RX ADMIN — SODIUM CHLORIDE 1000 ML/HR: 0.9 INJECTION, SOLUTION INTRAVENOUS at 13:27

## 2019-08-20 NOTE — ASSESSMENT & PLAN NOTE
· Presents with 1 day history right elbow pain and redness now extending to the right wrist and hand  · Was started on Keflex yesterday but pain and redness/swelling progressed  · Would not consider this as failed outpatient treatment, since the patient had been on antibiotics <24 hours  · Will be observed on med surg, start on IV cephazolin, elevated extremity  · Pain control with Tylenol and NSAIDs  · Avoid narcotics per patient's wishes due to his job as a

## 2019-08-20 NOTE — PLAN OF CARE
Problem: Potential for Falls  Goal: Patient will remain free of falls  Description  INTERVENTIONS:  - Assess patient frequently for physical needs  -  Identify cognitive and physical deficits and behaviors that affect risk of falls    -  [place] fall precautions as indicated by assessment   - Educate patient/family on patient safety including physical limitations  - Instruct patient to call for assistance with activity based on assessment  - Modify environment to reduce risk of injury  - Consider OT/PT consult to assist with strengthening/mobility  Outcome: Progressing     Problem: PAIN - ADULT  Goal: Verbalizes/displays adequate comfort level or baseline comfort level  Description  Interventions:  - Encourage patient to monitor pain and request assistance  - Assess pain using appropriate pain scale  - Administer analgesics based on type and severity of pain and evaluate response  - Implement non-pharmacological measures as appropriate and evaluate response  - Consider cultural and social influences on pain and pain management  - Notify physician/advanced practitioner if interventions unsuccessful or patient reports new pain  Outcome: Progressing     Problem: INFECTION - ADULT  Goal: Absence or prevention of progression during hospitalization  Description  INTERVENTIONS:  - Assess and monitor for signs and symptoms of infection  - Monitor lab/diagnostic results  - Monitor all insertion sites, i e  indwelling lines, tubes, and drains  - Monitor endotracheal if appropriate and nasal secretions for changes in amount and color  - [place] appropriate cooling/warming therapies per order  - Administer medications as ordered  - Instruct and encourage patient and family to use good hand hygiene technique  - Identify and instruct in appropriate isolation precautions for identified infection/condition  Outcome: Progressing  Goal: Absence of fever/infection during neutropenic period  Description  INTERVENTIONS:  - Monitor WBC    Outcome: Progressing     Problem: SAFETY ADULT  Goal: Maintain or return to baseline ADL function  Description  INTERVENTIONS:  -  Assess patient's ability to carry out ADLs; assess patient's baseline for ADL function and identify physical deficits which impact ability to perform ADLs (bathing, care of mouth/teeth, toileting, grooming, dressing, etc )  - Assess/evaluate cause of self-care deficits   - Assess range of motion  - Assess patient's mobility; develop plan if impaired  - Assess patient's need for assistive devices and provide as appropriate  - Encourage maximum independence but intervene and supervise when necessary  - Involve family in performance of ADLs  - Assess for home care needs following discharge   - Consider OT consult to assist with ADL evaluation and planning for discharge  - Provide patient education as appropriate  Outcome: Progressing  Goal: Maintain or return mobility status to optimal level  Description  INTERVENTIONS:  - Assess patient's baseline mobility status (ambulation, transfers, stairs, etc )    - Identify cognitive and physical deficits and behaviors that affect mobility  - Identify mobility aids required to assist with transfers and/or ambulation (gait belt, sit-to-stand, lift, walker, cane, etc )  - [place] fall precautions as indicated by assessment  - Record patient progress and toleration of activity level on Mobility SBAR; progress patient to next Phase/Stage  - Instruct patient to call for assistance with activity based on assessment  - Consider rehabilitation consult to assist with strengthening/weightbearing, etc   Outcome: Progressing     Problem: DISCHARGE PLANNING  Goal: Discharge to home or other facility with appropriate resources  Description  INTERVENTIONS:  - Identify barriers to discharge w/patient and caregiver  - Arrange for needed discharge resources and transportation as appropriate  - Identify discharge learning needs (meds, wound care, etc )  - Arrange for interpretive services to assist at discharge as needed  - Refer to Case Management Department for coordinating discharge planning if the patient needs post-hospital services based on physician/advanced practitioner order or complex needs related to functional status, cognitive ability, or social support system  Outcome: Progressing     Problem: Knowledge Deficit  Goal: Patient/family/caregiver demonstrates understanding of disease process, treatment plan, medications, and discharge instructions  Description  Complete learning assessment and assess knowledge base    Interventions:  - Provide teaching at level of understanding  - Provide teaching via preferred learning methods  Outcome: Progressing     Problem: SKIN/TISSUE INTEGRITY - ADULT  Goal: Skin integrity remains intact  Description  INTERVENTIONS  - Identify patients at risk for skin breakdown  - Assess and monitor skin integrity  - Assess and monitor nutrition and hydration status  - Monitor labs (i e  albumin)  - Assess for incontinence   - Turn and reposition patient  - Assist with mobility/ambulation  - Relieve pressure over bony prominences  - Avoid friction and shearing  - Provide appropriate hygiene as needed including keeping skin clean and dry  - Evaluate need for skin moisturizer/barrier cream  - Collaborate with interdisciplinary team (i e  Nutrition, Rehabilitation, etc )   - Patient/family teaching  Outcome: Progressing  Goal: Oral mucous membranes remain intact  Description  INTERVENTIONS  - Assess oral mucosa and hygiene practices  - Implement preventative oral hygiene regimen  - Implement oral medicated treatments as ordered  - Initiate Nutrition services referral as needed  Outcome: Progressing     Problem: MUSCULOSKELETAL - ADULT  Goal: Maintain or return mobility to safest level of function  Description  INTERVENTIONS:  - Assess patient's ability to carry out ADLs; assess patient's baseline for ADL function and identify physical deficits which impact ability to perform ADLs (bathing, care of mouth/teeth, toileting, grooming, dressing, etc )  - Assess/evaluate cause of self-care deficits   - Assess range of motion  - Assess patient's mobility  - Assess patient's need for assistive devices and provide as appropriate  - Encourage maximum independence but intervene and supervise when necessary  - Involve family in performance of ADLs  - Assess for home care needs following discharge   - Consider OT consult to assist with ADL evaluation and planning for discharge  - Provide patient education as appropriate  Outcome: Progressing  Goal: Maintain proper alignment of affected body part  Description  INTERVENTIONS:  - Support, maintain and protect limb and body alignment  - Provide patient/ family with appropriate education  Outcome: Progressing

## 2019-08-20 NOTE — LETTER
[id number]  [address]  Dept: [phone]    August 22, 2019     Patient: Derrick Melgoza   YOB: 1970   Date of Visit: 8/20/2019       To Whom it May Concern:    Derrick Melgoza is under my professional care  He was seen in the hospital from 8/20/2019   to 08/22/19  He may return to work on 8/26/19 without limitations  If you have any questions or concerns, please don't hesitate to call           Sincerely,          Winston Baker PA-C

## 2019-08-20 NOTE — H&P
H&P- Ronak Lisa 1970, 50 y o  male MRN: [id number]    Unit/Bed#: ED 21 Encounter: [id number]    Primary Care Provider: Ashley Thorpe DO   Date and time admitted to hospital: 8/20/2019 12:30 PM    * Olecranon bursitis of right elbow with associated surrounding cellulitis  Assessment & Plan  · Presents with 1 day history right elbow pain and redness now extending to the right wrist and hand  · Was started on Keflex yesterday but pain and redness/swelling progressed  · Would not consider this as failed outpatient treatment, since the patient had been on antibiotics <24 hours  · Will be observed on med surg, start on IV cephazolin, elevated extremity  · Pain control with Tylenol and NSAIDs  · Avoid narcotics per patient's wishes due to his job as a     Essential hypertension  [address] [place] [address]  · Blood pressure stable, continue basal take, Norvasc at home dose    Morbid obesity ([address] )  Assessment & Plan  · Encourage therapeutic lifestyle changes to promote weight loss      VTE Prophylaxis:  Ambulate     Code Status:  Full code    Anticipated Length of Stay:  Patient will be admitted on an Observation basis with an anticipated length of stay of  < 2 midnights  Justification for Hospital Stay:  Right olecranon bursitis/cellulitis    Chief Complaint:     Right upper extremity pain with redness and swelling    History of Present Illness:  Greg Hargrove is a 50 y o  male who presents with right elbow/forearm/wrist swelling with pain and redness  Patient initially noted redness and swelling of the right elbow yesterday  Saw his primary care physician who started him on cephalexin  Overnight, symptoms progressed with worsening redness, pain and swelling now involving the right wrist and forearm  He denies any trauma, no fever or chills  No other systemic symptoms  Has had similar episodes in the past involving left extremity  Review of Systems   Constitutional: Negative      HENT: Negative  Respiratory: Negative  Cardiovascular: Negative  Gastrointestinal: Negative  Genitourinary: Negative  Musculoskeletal: Positive for joint swelling  Skin: Positive for color change  Neurological: Negative  Psychiatric/Behavioral: Negative  All other systems reviewed and are negative  Past Medical History:   Diagnosis Date    Arthritis     Gout     Hypertension        History reviewed  No pertinent surgical history  Family History:  Family History   Problem Relation Age of Onset    No Known Problems Mother     No Known Problems Father        Home Meds:  all medications and allergies reviewed    Allergies: No Known Allergies      Marital Status: /Civil Union     Social History     Substance and Sexual Activity   Alcohol Use Yes    Comment: rarely; No alcohol use as per [employer]     Social History     Tobacco Use   Smoking Status Former Smoker   Smokeless Tobacco Never Used   Tobacco Comment    Never a smoker as per [employer]     Social History     Substance and Sexual Activity   Drug Use No           Physical Exam:   Vitals:   Blood Pressure: 111/67 (08/20/19 1427)  Pulse: 79 (08/20/19 1427)  Temperature: 98 3 °F (36 8 °C) (08/20/19 1227)  Temp Source: Oral (08/20/19 1227)  Respirations: 18 (08/20/19 1427)  Weight - Scale: (!) 167 kg (367 lb 8 1 oz) (08/20/19 1227)  SpO2: 100 % (08/20/19 1427)    Physical Exam   Constitutional: He is oriented to person, place, and time  He appears well-developed  No distress  HENT:   Head: Normocephalic and atraumatic  Eyes: EOM are normal  Right eye exhibits no discharge  Left eye exhibits no discharge  No scleral icterus  Neck: Normal range of motion  Neck supple  Cardiovascular: Normal rate, regular rhythm and normal heart sounds  No murmur heard  Pulmonary/Chest: Effort normal and breath sounds normal  No respiratory distress  He has no wheezes  He has no rales  Abdominal: Soft   Bowel sounds are normal  He exhibits no distension and no mass  There is no tenderness  Obese   Musculoskeletal: He exhibits no edema  Right elbow: He exhibits decreased range of motion and swelling  Tenderness found  Olecranon process tenderness noted  Right wrist: He exhibits decreased range of motion, tenderness and swelling  Neurological: He is alert and oriented to person, place, and time  Skin: He is not diaphoretic  There is erythema  Extensive tattoo on bilateral upper extremity limiting evaluation for extent of erythema   Vitals reviewed  Lab Results: I have personally reviewed pertinent reports  Results from last 7 days   Lab Units 08/20/19  1327   WBC Thousand/uL 7 43   HEMOGLOBIN g/dL 15 2   HEMATOCRIT % 46 7   PLATELETS Thousands/uL 218   NEUTROS PCT % 67   LYMPHS PCT % 23   MONOS PCT % 8   EOS PCT % 2     Results from last 7 days   Lab Units 08/20/19  1327   POTASSIUM mmol/L 4 3   CHLORIDE mmol/L 101   CO2 mmol/L 27   BUN mg/dL 9   CREATININE mg/dL 0 70   CALCIUM mg/dL 9 1           ** Please Note: Dragon 360 Dictation voice to text software may have been used in the creation of this document   **

## 2019-08-20 NOTE — ED PROVIDER NOTES
History  Chief Complaint   Patient presents with    Cellulitis     Seen by FP yesterday  Diagnosed with cellulitis of the right arm and given an antibiotic, no improvement  This is a 51 y/o M with PMHx of HTN who presents today for R elbow and forearm swelling and pain that started yesterday  The patient reports he was seen by his PCP yesterday and started on keflex  Since being seen, the erythema has spread from his elbow to his wrist down his forearm  He denies fevers, but reports significant swelling and tenderness  He reports he was admitted in the past for a similar cellulitis 2 years prior  No hx of MRSA  History provided by:  Patient      Prior to Admission Medications   Prescriptions Last Dose Informant Patient Reported? Taking? amLODIPine (NORVASC) 5 mg tablet   No Yes   Sig: Take 1 tablet (5 mg total) by mouth daily   cephalexin (KEFLEX) 500 mg capsule   No Yes   Sig: Take 1 capsule (500 mg total) by mouth every 6 (six) hours for 10 days   colchicine (COLCRYS) 0 6 mg tablet   No Yes   Sig: Take 1 tablet (0 6 mg total) by mouth daily   Patient taking differently: Take 0 6 mg by mouth as needed    cyclobenzaprine (FLEXERIL) 10 mg tablet   No Yes   Sig: Take 1 tablet (10 mg total) by mouth daily at bedtime   enalapril (VASOTEC) 20 mg tablet   No Yes   Sig: Take 1 tablet (20 mg total) by mouth daily   naproxen (NAPROSYN) 500 mg tablet   No Yes   Sig: Take 1 tablet (500 mg total) by mouth 2 (two) times a day with meals      Facility-Administered Medications: None       Past Medical History:   Diagnosis Date    Arthritis     Gout     Hypertension        History reviewed  No pertinent surgical history  Family History   Problem Relation Age of Onset    No Known Problems Mother     No Known Problems Father      I have reviewed and agree with the history as documented      Social History     Tobacco Use    Smoking status: Former Smoker    Smokeless tobacco: Never Used    Tobacco comment: Never a smoker as per [employer]   Substance Use Topics    Alcohol use: Yes     Comment: rarely; No alcohol use as per [employer]    Drug use: No        Review of Systems   Constitutional: Negative  Negative for fever  HENT: Negative  Eyes: Negative  Respiratory: Negative  Cardiovascular: Negative  Gastrointestinal: Negative  Endocrine: Negative  Genitourinary: Negative  Musculoskeletal: Positive for arthralgias, joint swelling and myalgias  Skin: Positive for rash  Allergic/Immunologic: Negative  Neurological: Negative  Hematological: Negative  Psychiatric/Behavioral: Negative  Physical Exam  Physical Exam   Constitutional: He is oriented to person, place, and time  He appears well-developed and well-nourished  HENT:   Head: Normocephalic and atraumatic  Eyes: Pupils are equal, round, and reactive to light  EOM are normal    Cardiovascular: Normal rate, regular rhythm and normal heart sounds  Pulmonary/Chest: Effort normal and breath sounds normal    Neurological: He is alert and oriented to person, place, and time  Skin: Skin is warm  Capillary refill takes less than 2 seconds  There is erythema  Noted gross swelling of his R forearm and elbow with streaking to wrist  The patient has no distal cyanosis  Sensation and strength intact  +2 radial pulse  Noted erythema and induration  Psychiatric: He has a normal mood and affect   His behavior is normal  Judgment and thought content normal        Vital Signs  ED Triage Vitals   Temperature Pulse Respirations Blood Pressure SpO2   08/20/19 1227 08/20/19 1227 08/20/19 1227 08/20/19 1227 08/20/19 1227   98 3 °F (36 8 °C) 79 16 128/66 97 %      Temp Source Heart Rate Source Patient Position - Orthostatic VS BP Location FiO2 (%)   08/20/19 1227 08/20/19 1427 08/20/19 1227 08/20/19 1227 --   Oral Monitor Sitting Left arm       Pain Score       08/20/19 1227       Worst Possible Pain           Vitals:    08/20/19 1536 08/20/19 1554 08/20/19 2346 08/21/19 0745   BP: 115/64 123/73 108/64 100/62   Pulse: 80 75 64 70   Patient Position - Orthostatic VS: Sitting Sitting Lying Sitting         Visual Acuity      ED Medications  Medications   amLODIPine (NORVASC) tablet 5 mg (has no administration in time range)   cyclobenzaprine (FLEXERIL) tablet 10 mg (10 mg Oral Refused 8/20/19 2103)   lisinopril (ZESTRIL) tablet 40 mg (has no administration in time range)   acetaminophen (TYLENOL) tablet 975 mg (0 mg Oral Return to [facility] 8/21/19 0558)   ibuprofen (MOTRIN) tablet 600 mg (has no administration in time range)   ketorolac (TORADOL) injection 15 mg (has no administration in time range)   ceFAZolin (ANCEF) IVPB (premix) 2,000 mg (2,000 mg Intravenous New Bag 8/21/19 0554)   ketorolac (TORADOL) injection 30 mg (30 mg Intravenous Given 8/20/19 1328)   ceftriaxone (ROCEPHIN) 1 g/50 mL in dextrose IVPB (0 mg Intravenous Stopped 8/20/19 1400)       Diagnostic Studies  Results Reviewed     Procedure Component Value Units Date/Time    Blood culture #2 [[id number]] Collected:  08/20/19 1533    Lab Status: In process Specimen:  Blood from Arm, Left Updated:  08/20/19 1539    Blood culture #1 [[id number]] Collected:  08/20/19 1327    Lab Status:   In process Specimen:  Blood from Arm, Right Updated:  08/20/19 7692    Basic metabolic panel [[id number]] Collected:  08/20/19 1327    Lab Status:  Final result Specimen:  Blood from Arm, Right Updated:  08/20/19 1354     Sodium 136 mmol/L      Potassium 4 3 mmol/L      Chloride 101 mmol/L      CO2 27 mmol/L      ANION GAP 8 mmol/L      BUN 9 mg/dL      Creatinine 0 70 mg/dL      Glucose 97 mg/dL      Calcium 9 1 mg/dL      eGFR 112 ml/min/1 73sq m     Narrative:       [place] mar[place] guidelines for Chronic Kidney Disease (CKD):     Stage 1 with normal or high GFR (GFR > 90 mL/min/1 73 square meters)    Stage 2 Mild CKD (GFR = 60-89 mL/min/1 73 square meters)    Stage 3A Moderate CKD (GFR = 45-59 mL/min/1 73 square meters)    Stage 3B Moderate CKD (GFR = 30-44 mL/min/1 73 square meters)    Stage 4 Severe CKD (GFR = 15-29 mL/min/1 73 square meters)    Stage 5 End Stage CKD (GFR <15 mL/min/1 73 square meters)  Note: GFR calculation is accurate only with a steady state creatinine    CBC and differential [[id number]] Collected:  08/20/19 1327    Lab Status:  Final result Specimen:  Blood from Arm, Right Updated:  08/20/19 1338     WBC 7 43 Thousand/uL      RBC 5 03 Million/uL      Hemoglobin 15 2 g/dL      Hematocrit 46 7 %      MCV 93 fL      MCH 30 2 pg      MCHC 32 5 g/dL      RDW 13 9 %      MPV 11 0 fL      Platelets 234 Thousands/uL      nRBC 0 /100 WBCs      Neutrophils Relative 67 %      Immat GRANS % 0 %      Lymphocytes Relative 23 %      Monocytes Relative 8 %      Eosinophils Relative 2 %      Basophils Relative 0 %      Neutrophils Absolute 4 94 Thousands/µL      Immature Grans Absolute 0 02 Thousand/uL      Lymphocytes Absolute 1 72 Thousands/µL      Monocytes Absolute 0 61 Thousand/µL      Eosinophils Absolute 0 12 Thousand/µL      Basophils Absolute 0 02 Thousands/µL                  VAS upper limb venous duplex scan, unilateral/limited   Final Result by Alisson Shelton MD (08/20 1900)      XR wrist 3+ views RIGHT   Final Result by Aren Thomas MD (08/20 1559)      No definite radiographic evidence for osteomyelitis  Workstation performed: [id number]         XR elbow 2 views RIGHT   ED Interpretation by Shawnee Kang PA-C (08/20 1406)   No effusion  Final Result by Aren Thomas MD (08/20 1601)      No radiographic evidence for osteomyelitis  Possible small anterior elbow joint effusion  Workstation performed: [id number]                    Procedures  Procedures       ED Course  ED Course as of Aug 21 0754   Tue Aug 20, 2019   1434 Vascular study negative      1447 Spoke to patient  He is refusing narcotics currently for pain   Reports some pain improvement  [address] to Dr Denys Unger who will admit patient to observation                                  [facility]  Number of Diagnoses or Management Options  Cellulitis of right upper extremity:   Diagnosis management comments: This is a 49 y/o M who presents today for R upper extremity cellulitis  His labs are unremarkable  US venous duplex was negative for DVT  I spoke to the hospitalist - given that the patient has worsening cellulitis with streaking, recommended admission to observation for IV antibiotics and pain management  Patient refuses narcotic use  Admit to observation  Disposition  Final diagnoses:   Cellulitis of right upper extremity     Time reflects when diagnosis was documented in both MDM as applicable and the Disposition within this note     Time User Action Codes Description Comment    8/20/2019  2:54 PM Jacek Harvey Add [L03 [id number]] Cellulitis of right upper extremity       ED Disposition     ED Disposition Condition Date/Time Comment    Admit Stable Tue Aug 20, 2019  2:53 PM Case was discussed with Dr Denys Unger and the patient's admission status was agreed to be Admission Status: observation status to the service of Dr Denys Unger           Follow-up Information    None         Current Discharge Medication List      CONTINUE these medications which have NOT CHANGED    Details   amLODIPine (NORVASC) 5 mg tablet Take 1 tablet (5 mg total) by mouth daily  Qty: 30 tablet, Refills: 5    Associated Diagnoses: Essential hypertension      cephalexin (KEFLEX) 500 mg capsule Take 1 capsule (500 mg total) by mouth every 6 (six) hours for 10 days  Qty: 40 capsule, Refills: 0    Associated Diagnoses: Cellulitis of right upper extremity      colchicine (COLCRYS) 0 6 mg tablet Take 1 tablet (0 6 mg total) by mouth daily  Qty: 60 tablet, Refills: 2    Associated Diagnoses: Acute gout, unspecified cause, unspecified site      cyclobenzaprine (FLEXERIL) 10 mg tablet Take 1 tablet (10 mg total) by mouth daily at bedtime  Qty: 30 tablet, Refills: 5    Associated Diagnoses: Thoracic back pain, unspecified back pain laterality, unspecified chronicity      enalapril (VASOTEC) 20 mg tablet Take 1 tablet (20 mg total) by mouth daily  Qty: 30 tablet, Refills: 5    Associated Diagnoses: Essential hypertension      naproxen (NAPROSYN) 500 mg tablet Take 1 tablet (500 mg total) by mouth 2 (two) times a day with meals  Qty: 30 tablet, Refills: 1    Associated Diagnoses: Cellulitis of right upper extremity           No discharge procedures on file      ED Provider  Electronically Signed by           Martha Smith PA-C  08/21/19 5962

## 2019-08-21 PROCEDURE — 99232 SBSQ HOSP IP/OBS MODERATE 35: CPT | Performed by: PHYSICIAN ASSISTANT

## 2019-08-21 RX ADMIN — CEFAZOLIN SODIUM 2000 MG: 2 SOLUTION INTRAVENOUS at 21:54

## 2019-08-21 RX ADMIN — CEFAZOLIN SODIUM 2000 MG: 2 SOLUTION INTRAVENOUS at 05:54

## 2019-08-21 RX ADMIN — CEFAZOLIN SODIUM 2000 MG: 2 SOLUTION INTRAVENOUS at 13:11

## 2019-08-21 NOTE — PLAN OF CARE
Problem: Potential for Falls  Goal: Patient will remain free of falls  Description  INTERVENTIONS:  - Assess patient frequently for physical needs  -  Identify cognitive and physical deficits and behaviors that affect risk of falls    -  [place] fall precautions as indicated by assessment   - Educate patient/family on patient safety including physical limitations  - Instruct patient to call for assistance with activity based on assessment  - Modify environment to reduce risk of injury  - Consider OT/PT consult to assist with strengthening/mobility  Outcome: Progressing     Problem: PAIN - ADULT  Goal: Verbalizes/displays adequate comfort level or baseline comfort level  Description  Interventions:  - Encourage patient to monitor pain and request assistance  - Assess pain using appropriate pain scale  - Administer analgesics based on type and severity of pain and evaluate response  - Implement non-pharmacological measures as appropriate and evaluate response  - Consider cultural and social influences on pain and pain management  - Notify physician/advanced practitioner if interventions unsuccessful or patient reports new pain  Outcome: Progressing     Problem: INFECTION - ADULT  Goal: Absence or prevention of progression during hospitalization  Description  INTERVENTIONS:  - Assess and monitor for signs and symptoms of infection  - Monitor lab/diagnostic results  - Monitor all insertion sites, i e  indwelling lines, tubes, and drains  - Monitor endotracheal if appropriate and nasal secretions for changes in amount and color  - [place] appropriate cooling/warming therapies per order  - Administer medications as ordered  - Instruct and encourage patient and family to use good hand hygiene technique  - Identify and instruct in appropriate isolation precautions for identified infection/condition  Outcome: Progressing  Goal: Absence of fever/infection during neutropenic period  Description  INTERVENTIONS:  - Monitor WBC    Outcome: Progressing     Problem: SAFETY ADULT  Goal: Maintain or return to baseline ADL function  Description  INTERVENTIONS:  -  Assess patient's ability to carry out ADLs; assess patient's baseline for ADL function and identify physical deficits which impact ability to perform ADLs (bathing, care of mouth/teeth, toileting, grooming, dressing, etc )  - Assess/evaluate cause of self-care deficits   - Assess range of motion  - Assess patient's mobility; develop plan if impaired  - Assess patient's need for assistive devices and provide as appropriate  - Encourage maximum independence but intervene and supervise when necessary  - Involve family in performance of ADLs  - Assess for home care needs following discharge   - Consider OT consult to assist with ADL evaluation and planning for discharge  - Provide patient education as appropriate  Outcome: Progressing  Goal: Maintain or return mobility status to optimal level  Description  INTERVENTIONS:  - Assess patient's baseline mobility status (ambulation, transfers, stairs, etc )    - Identify cognitive and physical deficits and behaviors that affect mobility  - Identify mobility aids required to assist with transfers and/or ambulation (gait belt, sit-to-stand, lift, walker, cane, etc )  - [place] fall precautions as indicated by assessment  - Record patient progress and toleration of activity level on Mobility SBAR; progress patient to next Phase/Stage  - Instruct patient to call for assistance with activity based on assessment  - Consider rehabilitation consult to assist with strengthening/weightbearing, etc   Outcome: Progressing     Problem: DISCHARGE PLANNING  Goal: Discharge to home or other facility with appropriate resources  Description  INTERVENTIONS:  - Identify barriers to discharge w/patient and caregiver  - Arrange for needed discharge resources and transportation as appropriate  - Identify discharge learning needs (meds, wound care, etc )  - Arrange for interpretive services to assist at discharge as needed  - Refer to Case Management Department for coordinating discharge planning if the patient needs post-hospital services based on physician/advanced practitioner order or complex needs related to functional status, cognitive ability, or social support system  Outcome: Progressing     Problem: Knowledge Deficit  Goal: Patient/family/caregiver demonstrates understanding of disease process, treatment plan, medications, and discharge instructions  Description  Complete learning assessment and assess knowledge base    Interventions:  - Provide teaching at level of understanding  - Provide teaching via preferred learning methods  Outcome: Progressing     Problem: SKIN/TISSUE INTEGRITY - ADULT  Goal: Skin integrity remains intact  Description  INTERVENTIONS  - Identify patients at risk for skin breakdown  - Assess and monitor skin integrity  - Assess and monitor nutrition and hydration status  - Monitor labs (i e  albumin)  - Assess for incontinence   - Turn and reposition patient  - Assist with mobility/ambulation  - Relieve pressure over bony prominences  - Avoid friction and shearing  - Provide appropriate hygiene as needed including keeping skin clean and dry  - Evaluate need for skin moisturizer/barrier cream  - Collaborate with interdisciplinary team (i e  Nutrition, Rehabilitation, etc )   - Patient/family teaching  Outcome: Progressing  Goal: Oral mucous membranes remain intact  Description  INTERVENTIONS  - Assess oral mucosa and hygiene practices  - Implement preventative oral hygiene regimen  - Implement oral medicated treatments as ordered  - Initiate Nutrition services referral as needed  Outcome: Progressing     Problem: MUSCULOSKELETAL - ADULT  Goal: Maintain or return mobility to safest level of function  Description  INTERVENTIONS:  - Assess patient's ability to carry out ADLs; assess patient's baseline for ADL function and identify physical deficits which impact ability to perform ADLs (bathing, care of mouth/teeth, toileting, grooming, dressing, etc )  - Assess/evaluate cause of self-care deficits   - Assess range of motion  - Assess patient's mobility  - Assess patient's need for assistive devices and provide as appropriate  - Encourage maximum independence but intervene and supervise when necessary  - Involve family in performance of ADLs  - Assess for home care needs following discharge   - Consider OT consult to assist with ADL evaluation and planning for discharge  - Provide patient education as appropriate  Outcome: Progressing  Goal: Maintain proper alignment of affected body part  Description  INTERVENTIONS:  - Support, maintain and protect limb and body alignment  - Provide patient/ family with appropriate education  Outcome: Progressing

## 2019-08-21 NOTE — UTILIZATION REVIEW
Initial Clinical Review    Admission: Date/Time/Statement:  8/20/19 @ 1455 Observation Written   Orders Placed This Encounter   Procedures    Place in Observation (expected length of stay for this patient is less than two midnights)     Standing Status:   Standing     Number of Occurrences:   1     Order Specific Question:   Admitting Physician     Answer:   Demetra Hickman [[id number]]     Order Specific Question:   Level of Care     Answer:   Med Surg [[id number]]     ED Arrival Information     Expected Arrival Acuity Means of Arrival Escorted By Service Admission Type    - 8/20/2019 12:19 Urgent Walk-In Self Hospitalist Urgent    Arrival Complaint    Arm Pain        Chief Complaint   Patient presents with    Cellulitis     Seen by FP yesterday  Diagnosed with cellulitis of the right arm and given an antibiotic, no improvement  Assessment/Plan:  50 y o  male who presents with right elbow/forearm/wrist swelling with pain and redness  Patient initially noted redness and swelling of the right elbow yesterday  Saw his primary care physician who started him on cephalexin  Overnight, symptoms progressed with worsening redness, pain and swelling now involving the right wrist and forearm  He denies any trauma, no fever or chills  No other systemic symptoms  Has had similar episodes in the past involving left extremity  Admit to OBSERVATION STATUS due to Olecranon bursitis of right elbow with associated surrounding cellulitis:  IV ABX, pain control, continue home meds      ED Triage Vitals   Temperature Pulse Respirations Blood Pressure SpO2   08/20/19 1227 08/20/19 1227 08/20/19 1227 08/20/19 1227 08/20/19 1227   98 3 °F (36 8 °C) 79 16 128/66 97 %      Temp Source Heart Rate Source Patient Position - Orthostatic VS BP Location FiO2 (%)   08/20/19 1227 08/20/19 1427 08/20/19 1227 08/20/19 1227 --   Oral Monitor Sitting Left arm       Pain Score       08/20/19 1227       Worst Possible Pain        Wt Readings from Last 1 Encounters:   08/20/19 (!) 167 kg (367 lb 8 1 oz)     Additional Vital Signs:   Date/Time  Temp  Pulse  Resp  BP  SpO2  O2 Device  Patient Position - Orthostatic VS   08/20/19 2346  98 7 °F (37 1 °C)  64  16  108/64  95 %  None (Room air)  Lying   08/20/19 1554  99 2 °F (37 3 °C)  75  18  123/73  97 %  None (Room air)  Sitting   08/20/19 1536  --  80  18  115/64  98 %  None (Room air)  Sitting   08/20/19 1427  --  79  18  111/67  100 %  None (Room air)  Sitting   08/20/19 1227  98 3 °F (36 8 °C)  79  16  128/66  97 %  None (Room air)  Sitting     Pertinent Labs/Diagnostic Test Results:   Results from last 7 days   Lab Units 08/20/19  1327   WBC Thousand/uL 7 43   HEMOGLOBIN g/dL 15 2   HEMATOCRIT % 46 7   PLATELETS Thousands/uL 218   NEUTROS ABS Thousands/µL 4 94     Results from last 7 days   Lab Units 08/20/19  1327   SODIUM mmol/L 136   POTASSIUM mmol/L 4 3   CHLORIDE mmol/L 101   CO2 mmol/L 27   ANION GAP mmol/L 8   BUN mg/dL 9   CREATININE mg/dL 0 70   EGFR ml/min/1 73sq m 112   CALCIUM mg/dL 9 1     Results from last 7 days   Lab Units 08/20/19  1327   GLUCOSE RANDOM mg/dL 97     8/20 XR RIGHT WRIST:  No definite radiographic evidence for osteomyelitis  8/20 XR RIGHT ELBOW:  No radiographic evidence for osteomyelitis  Possible small anterior elbow joint effusion  8/20 VAS UPPER LIMB VENOUS DUPLEX:  Impression  RIGHT UPPER LIMB:  No evidence of acute or chronic deep vein thrombosis  No evidence of superficial thrombophlebitis noted  Doppler evaluation shows a normal response to augmentation maneuvers  LEFT UPPER LIMB LIMITED:  Evaluation shows no evidence of thrombus in the internal jugular vein,  subclavian vein, and the brachiocephalic vein    ED Treatment:   Medication Administration from 08/20/2019 1219 to 08/20/2019 1550       Date/Time Order Dose Route Action     08/20/2019 1327 sodium chloride 0 9 % infusion 1,000 mL/hr Intravenous New Bag     08/20/2019 1328 ketorolac (TORADOL) injection 30 mg 30 mg Intravenous Given     08/20/2019 1330 ceftriaxone (ROCEPHIN) 1 g/50 mL in dextrose IVPB 1,000 mg Intravenous New Bag        Past Medical History:   Diagnosis Date    Arthritis     Gout     Hypertension      Present on Admission:   Olecranon bursitis of right elbow with associated surrounding cellulitis   Essential hypertension   Morbid obesity ([facility] [address] )      Admitting Diagnosis: Arm pain [M79 603]  Cellulitis of right upper extremity [L03 113]  Age/Sex: 50 y o  male  Admission Orders:  REGULAR DIET  OOB AS EYD  IO  BLOOD CXS PENDING  Current Facility-Administered Medications:  acetaminophen 975 mg Oral Q8H [facility]   amLODIPine 5 mg Oral Daily   cefazolin 2,000 mg Intravenous Q8H   cyclobenzaprine 10 mg Oral HS   ibuprofen 600 mg Oral Q6H PRN   ketorolac 15 mg Intravenous Q6H PRN   lisinopril 40 mg Oral Daily     Network Utilization Review Department  Phone: [phone]; Fax [phone]  [email or web address]  ATTENTION: Please call with any questions or concerns to [phone]  and carefully listen to the prompts so that you are directed to the right person  Send all requests for admission clinical reviews, approved or denied determinations and any other requests to fax [phone]   All voicemails are confidential

## 2019-08-21 NOTE — ASSESSMENT & PLAN NOTE
· Blood pressure stable, continue basal take, Norvasc at home dose, lisinopril substituted for enalapril while admitted

## 2019-08-21 NOTE — PROGRESS NOTES
[facility] Internal Medicine  Progress Note - Manav Bansal 1970, 50 y o  male MRN: [id number]  Unit/Bed#: [place] [address] -01 Encounter: [id number]  Primary Care Provider: Kobe Grace DO   Date and time admitted to hospital: 8/20/2019 12:30 PM    * Olecranon bursitis of right elbow with associated surrounding cellulitis  Assessment & Plan  · Presents with 1 day history right elbow pain and redness now extending to the right wrist and hand  · Was started on Keflex on day prior to admission but pain and redness/swelling progressed  · Would not consider this as failed outpatient treatment, since the patient had been on antibiotics <24 hours  · Started on IV cephazolin, elevated extremity, plan to transition to p o  antibiotics tomorrow and discharge  · Pain control with Tylenol and NSAIDs  · Avoid narcotics per patient's wishes due to his job as a     Morbid obesity ([facility] [address] )  Assessment & Plan  · Encourage therapeutic lifestyle changes to promote weight loss    Essential hypertension  Assessment & Plan  · Blood pressure stable, continue basal take, Norvasc at home dose, lisinopril substituted for enalapril while admitted      VTE Pharmacologic Prophylaxis:   Pharmacologic: Pharmacologic VTE Prophylaxis contraindicated due to ambulatory  Mechanical VTE Prophylaxis in Place: No    Patient Centered Rounds: I have performed bedside rounds with nursing staff today  Discussions with Specialists or Other Care Team Provider:  Discussed with attending, Dr Loren Fowler, recommends another day of IV antibiotics and making the patient inpatient status  Education and Discussions with Family / Patient:  Discussed care plan with patient at bedside  Answered all questions to the best of my ability  Time Spent for Care: 20 minutes  More than 50% of total time spent on counseling and coordination of care as described above      Current Length of Stay: 0 day(s)    Current Patient Status: Inpatient Certification Statement: The patient will continue to require additional inpatient hospital stay due to IV antibiotics    Discharge Plan:  Patient comes from home, plan to return home after acceptable response to IV antibiotics and transitioning patient p o  Antibiotics anticipate 24-48 hours prior to discharge  Code Status: Level 1 - Full Code      Subjective:   Patient reports improvement in symptoms since admission  Pain is still present but controlled, he is starting to regain ROM  Objective:     Vitals:   Temp (24hrs), Av 2 °F (36 8 °C), Min:97 5 °F (36 4 °C), Max:99 2 °F (37 3 °C)    Temp:  [97 5 °F (36 4 °C)-99 2 °F (37 3 °C)] 97 5 °F (36 4 °C)  HR:  [61-80] 61  Resp:  [16-18] 16  BP: ()/(45-73) 97/45  SpO2:  [95 %-98 %] 95 %  Body mass index is 45 69 kg/m²  Input and Output Summary (last 24 hours):     No intake or output data in the 24 hours ending 19 1521    Physical Exam:     Physical Exam   Constitutional: He appears well-developed and well-nourished  No distress  HENT:   Head: Normocephalic and atraumatic  Eyes: Conjunctivae are normal  No scleral icterus  Cardiovascular: Normal rate and regular rhythm  No murmur heard  Pulmonary/Chest: Effort normal and breath sounds normal  No respiratory distress  He has no wheezes  He has no rales  Abdominal: Soft  He exhibits no distension  There is no tenderness  Musculoskeletal: He exhibits no edema  Right elbow: Tenderness (w/ erythema and swelling) found  Neurological: He is alert  Skin: Skin is warm and dry  No erythema  Psychiatric: He has a normal mood and affect  Nursing note and vitals reviewed      Additional Data:     Labs:    Results from last 7 days   Lab Units 19  1327   WBC Thousand/uL 7 43   HEMOGLOBIN g/dL 15 2   HEMATOCRIT % 46 7   PLATELETS Thousands/uL 218   NEUTROS PCT % 67   LYMPHS PCT % 23   MONOS PCT % 8   EOS PCT % 2     Results from last 7 days   Lab Units 19  1327 SODIUM mmol/L 136   POTASSIUM mmol/L 4 3   CHLORIDE mmol/L 101   CO2 mmol/L 27   BUN mg/dL 9   CREATININE mg/dL 0 70   ANION GAP mmol/L 8   CALCIUM mg/dL 9 1   GLUCOSE RANDOM mg/dL 97                           * I Have Reviewed All Lab Data Listed Above  * Additional Pertinent Lab Tests Reviewed: All Labs Within Last 24 Hours Reviewed    Imaging:    Imaging Reports Reviewed Today Include:   · X-ray right wrist 8/20:  No definite radiographic evidence for osteomyelitis  · X-ray right elbow 8/20:  No radiographic evidence for osteomyelitis  Possible small anterior joint effusion  · The AS upper limb venous duplex study 8/20:  No evidence of acute or chronic DVT in right upper limb, no evidence of thrombus in it left internal jugular vein  Imaging Personally Reviewed by Myself Includes:  None    Recent Cultures (last 7 days):           Last 24 Hours Medication List:     Current Facility-Administered Medications:  acetaminophen 975 mg Oral Q8H [address] Charito Manzo MD    amLODIPine 5 mg Oral Daily Charito Manzo MD    cefazolin 2,000 mg Intravenous Jr Lyons MD Last Rate: 2,000 mg (08/21/19 1311)   cyclobenzaprine 10 mg Oral HS Charito Manzo MD    ibuprofen 600 mg Oral Q6H PRN Charito Manzo MD    lisinopril 40 mg Oral Daily Charito Manzo MD         Today, Patient Was Seen By: Andra Sharp PA-C    ** Please Note: Dictation voice to text software may have been used in the creation of this document   **

## 2019-08-21 NOTE — ASSESSMENT & PLAN NOTE
· Presents with 1 day history right elbow pain and redness now extending to the right wrist and hand  · Was started on Keflex on day prior to admission but pain and redness/swelling progressed  · Would not consider this as failed outpatient treatment, since the patient had been on antibiotics <24 hours  · Started on IV cephazolin, elevated extremity, plan to transition to p o  antibiotics tomorrow and discharge  · Pain control with Tylenol and NSAIDs  · Avoid narcotics per patient's wishes due to his job as a

## 2019-08-21 NOTE — SOCIAL WORK
Pt admitted with (R) elbow bursitis and surrounding cellulitis on IV abx  Pt lives with wife in a [facility], is independent with all aspects of care and IADL's, ambulating without AD  Pt works FT as a  and will need a work excuse with release to work date on d/c (sticky note for attending placed)  Denies legal issues, D&A, MH hx nor having a POA (declined information re: same)  PCP is Dr Jake Wilkins and he uses [address] [place] [address] in [place] for prescriptions  Pt's will have transportation available to him on d/c  No further questions/concerns voiced at this time  No barriers to d/c identified  CM reviewed discharge planning process including the following: identifying caregivers at home, preference for d/c planning needs, availability of [facility] Meds to Bed program, availability of treatment team to discuss questions or concerns patient and/or family may have regarding diagnosis, plan of care, old or new medications and discharge planning   CM will continue to follow for care coordination and update assessment as appropriate

## 2019-08-22 VITALS
BODY MASS INDEX: 45.69 KG/M2 | DIASTOLIC BLOOD PRESSURE: 79 MMHG | SYSTOLIC BLOOD PRESSURE: 124 MMHG | RESPIRATION RATE: 16 BRPM | HEART RATE: 69 BPM | OXYGEN SATURATION: 100 % | TEMPERATURE: 97.6 F | WEIGHT: 315 LBS

## 2019-08-22 PROCEDURE — 99239 HOSP IP/OBS DSCHRG MGMT >30: CPT | Performed by: INTERNAL MEDICINE

## 2019-08-22 RX ORDER — CEPHALEXIN 500 MG/1
500 CAPSULE ORAL EVERY 6 HOURS SCHEDULED
Qty: 40 CAPSULE | Refills: 0 | Status: SHIPPED | OUTPATIENT
Start: 2019-08-22 | End: 2019-09-01

## 2019-08-22 RX ADMIN — LISINOPRIL 40 MG: 20 TABLET ORAL at 07:59

## 2019-08-22 RX ADMIN — CEFAZOLIN SODIUM 2000 MG: 2 SOLUTION INTRAVENOUS at 05:30

## 2019-08-22 RX ADMIN — AMLODIPINE BESYLATE 5 MG: 5 TABLET ORAL at 07:59

## 2019-08-22 NOTE — UTILIZATION REVIEW
Continued Stay Review    PLEASE NOTE:  Patient was UPGRADED to IP 8/21 @ 1414 from OBS 8/20 @ 1455 due to continued need for IV Abxs for Cellulitis / Bursitis      08/21/19 1414  Inpatient Admission Once     Transfer Service: General Medicine    Expected Discharge Time: Afternoon    Expected Discharge Date: 08/22/19       Question Answer Comment   Admitting Physician Sanjeev Roman    Level of Care Med Surg    Estimated length of stay More than 2 Midnights    Certification I certify that inpatient services are medically necessary for this patient for a duration of greater than two midnights  See H&P and MD Progress Notes for additional information about the patient's course of treatment  08/21/1       Date: 8/22/2019                         Current Patient Class: IP  Current Level of Care: MedSurg    HPI:48 y o  male initially admitted on 8/20/19 @ 1455 Observation Written     Assessment/Plan:  Olecranon Bursitis Right Elbow with surrounding Cellulitis   R elbow with tenderness w/ erythema & swelling  Continues with pain but improving  He is starting to regain ROM  Pain control with Tylenol and NSAIDs  Avoid narcotics per patient's wishes due to his job as a    Continue IV Abx's    Pertinent Labs/Diagnostic Results:   Results from last 7 days   Lab Units 08/20/19  1327   WBC Thousand/uL 7 43   HEMOGLOBIN g/dL 15 2   HEMATOCRIT % 46 7   PLATELETS Thousands/uL 218   NEUTROS ABS Thousands/µL 4 94     Results from last 7 days   Lab Units 08/20/19  1327   SODIUM mmol/L 136   POTASSIUM mmol/L 4 3   CHLORIDE mmol/L 101   CO2 mmol/L 27   ANION GAP mmol/L 8   BUN mg/dL 9   CREATININE mg/dL 0 70   EGFR ml/min/1 73sq m 112   CALCIUM mg/dL 9 1     Results from last 7 days   Lab Units 08/20/19  1327   GLUCOSE RANDOM mg/dL 97     Results from last 7 days   Lab Units 08/20/19  1533 08/20/19  1327   BLOOD CULTURE  No Growth at 24 hrs  No Growth at 24 hrs       Vital Signs:   08/22/19 0755  97 6 °F (36 4 °C) 69  16  124/79  100 %  None (Room air)  Sitting   08/21/19 2300  97 6 °F (36 4 °C)  65  18  101/51  92 %  None (Room air)  Lying   08/21/19 1512  97 5 °F (36 4 °C)  61  16  97/45  95 %  None (Room air)  Lying         Medications:   Scheduled Meds:   Current Facility-Administered Medications:  acetaminophen 975 mg Oral Q8H [address]   amLODIPine 5 mg Oral Daily   cefazolin 2,000 mg Intravenous Q8H   cyclobenzaprine 10 mg Oral HS   ibuprofen 600 mg Oral Q6H PRN   ketorolac 15 mg Intravenous Q6H PRN   lisinopril 40 mg Oral Daily        Discharge Plan: Home once medically cleared    Network Utilization Review Department  Phone: [phone]; Fax [phone]  [email or web address]  ATTENTION: Please call with any questions or concerns to [phone]  and carefully listen to the prompts so that you are directed to the right person  Send all requests for admission clinical reviews, approved or denied determinations and any other requests to fax [phone]   All voicemails are confidential

## 2019-08-22 NOTE — NURSING NOTE
Pt refused all meds except for IV ABX overnight  Also refused AM blood work  Requested that his IV be removed at approx 0600   Day nurse to speak to physician before removal

## 2019-08-22 NOTE — NURSING NOTE
IV removed  Patient provided with avs and verbalized understanding  Patient aware to pickup rx for abx at his home pharmacy  Patient left with all belongings at time of discharge

## 2019-08-22 NOTE — ASSESSMENT & PLAN NOTE
· Presents with 1 day history right elbow pain and redness now extending to the right wrist and hand  · Was started on Keflex on day prior to admission but pain and redness/swelling progressed  · Would not consider this as failed outpatient treatment, since the patient had been on antibiotics <24 hours  · Started on IV cephazolin, elevated extremity, plan to transition to p o   Keflex for discharge  · Pain control with Tylenol and NSAIDs  · Avoid narcotics per patient's wishes due to his job as a

## 2019-08-22 NOTE — DISCHARGE SUMMARY
[address] [facility]  Discharge- [facility] 1970, 50 y o  male MRN: [id number]  Unit[facility]Bed[facility] 2 [facility] [id number] Encounter: [id number]  Primary Care Provider: Donnell Bullard DO   Date and time admitted to hospital: 8/20/2019 12:30 PM      * Olecranon bursitis of right elbow with associated surrounding cellulitis  Assessment & Plan  · Presents with 1 day history right elbow pain and redness now extending to the right wrist and hand  · Was started on Keflex on day prior to admission but pain and redness/swelling progressed  · Would not consider this as failed outpatient treatment, since the patient had been on antibiotics <24 hours  · Started on IV cephazolin, elevated extremity, plan to transition to p o  Keflex for discharge  · Pain control with Tylenol and NSAIDs  · Avoid narcotics per patient's wishes due to his job as a     Morbid obesity ([facility] [address] )  Assessment & Plan  · Encourage therapeutic lifestyle changes to promote weight loss    Essential hypertension  Assessment & Plan  · Blood pressure stable, continue basal take, Norvasc at home dose, lisinopril substituted for enalapril while admitted      Discharging Physician / Practitioner: Jenny Lawson PA-C  PCP: Donnell Bullard DO  Admission Date:   Admission Orders (From admission, onward)     Ordered        08/21/19 1414  Inpatient Admission  Once         08/20/19 1455  Place in Observation (expected length of stay for this patient is less than two midnights)  Once                   Discharge Date: 08/22/19    Resolved Problems  Date Reviewed: 8/22/2019    None          Consultations During Hospital Stay:  · None    Procedures Performed:   · None    Significant Findings / Test Results:   · Right Wrist 8/20:  No definite radiographic evidence for osteomyelitis  · X-ray right elbow 8/20:  No radiographic evidence for osteomyelitis    Possible small anterior joint effusion  · Venous Doppler upper limb 8/20:  No evidence of acute or chronic DVT  · Blood culture negative at 24 hours    Incidental Findings:   · None     Test Results Pending at Discharge (will require follow up): · Final result blood culture     Outpatient Tests Requested:  · None    Complications:  None    Reason for Admission:  Worsening redness, pain, swelling of right elbow    Hospital Course:     Janeth Conklin is a 50 y o  male patient with past medical history of hypertension and obesity who originally presented to the hospital on 8/20/2019 due to right elbow pain with redness extending down the arm to the wrist   Patient was prescribed Keflex and took this medication for less than 24 hours, reports pain and redness and swelling had progressed  Patient was admitted and placed on IV cefazolin pain was controlled with Tylenol and NSAIDs  Swelling, pain, redness continued to resolve throughout the admission and patient's range of motion returned  On day of discharge, patient did refuses morning labs  However, there is no white blood cell elevation or fever noted on admission  We stable for discharge at this time, to complete 10 day course of Keflex 500 mg q 6 outpatient  The patient, initially admitted to the hospital as inpatient, was discharged earlier than expected given the following: Appropriate for outpatient follow-up  Please see above list of diagnoses and related plan for additional information  Condition at Discharge: stable     Discharge Day Visit / Exam:     Subjective:  Patient reports resolution of redness  Vitals: Blood Pressure: 124/79 (08/22/19 0755)  Pulse: 69 (08/22/19 0755)  Temperature: 97 6 °F (36 4 °C) (08/22/19 0755)  Temp Source: Temporal (08/22/19 0755)  Respirations: 16 (08/22/19 0755)  Weight - Scale: (!) 167 kg (367 lb 8 1 oz) (08/20/19 1227)  SpO2: 100 % (08/22/19 0755)  Exam:   Physical Exam   Constitutional: He appears well-developed and well-nourished  No distress  HENT:   Head: Normocephalic and atraumatic     Eyes: Conjunctivae are normal  No scleral icterus  Cardiovascular: Normal rate and regular rhythm  No murmur heard  Pulmonary/Chest: Effort normal and breath sounds normal  No respiratory distress  He has no wheezes  He has no rales  Abdominal: Soft  He exhibits no distension  There is no tenderness  Musculoskeletal: He exhibits no edema  Right elbow: He exhibits swelling  Tenderness found  Neurological: He is alert  Skin: Skin is warm and dry  No erythema  Psychiatric: He has a normal mood and affect  Nursing note and vitals reviewed  Discussion with Family: Discussed discharge plan with patient at bedside, answered all questions to the best of my ability  Discharge instructions/Information to patient and family:   See after visit summary for information provided to patient and family  Provisions for Follow-Up Care:  See after visit summary for information related to follow-up care and any pertinent home health orders  Disposition:     Home    For Discharges to [facility] SNF:   · Not Applicable to this Patient - Not Applicable to this Patient    Planned Readmission: None     Discharge Statement:  I spent 60 minutes discharging the patient  This time was spent on the day of discharge  I had direct contact with the patient on the day of discharge  Greater than 50% of the total time was spent examining patient, answering all patient questions, arranging and discussing plan of care with patient as well as directly providing post-discharge instructions  Additional time then spent on discharge activities  Discharge Medications:  See after visit summary for reconciled discharge medications provided to patient and family        ** Please Note: This note has been constructed using a voice recognition system **

## 2019-08-22 NOTE — PLAN OF CARE
Problem: Potential for Falls  Goal: Patient will remain free of falls  Description  INTERVENTIONS:  - Assess patient frequently for physical needs  -  Identify cognitive and physical deficits and behaviors that affect risk of falls    -  [place] fall precautions as indicated by assessment   - Educate patient/family on patient safety including physical limitations  - Instruct patient to call for assistance with activity based on assessment  - Modify environment to reduce risk of injury  - Consider OT/PT consult to assist with strengthening/mobility  Outcome: Adequate for Discharge     Problem: PAIN - ADULT  Goal: Verbalizes/displays adequate comfort level or baseline comfort level  Description  Interventions:  - Encourage patient to monitor pain and request assistance  - Assess pain using appropriate pain scale  - Administer analgesics based on type and severity of pain and evaluate response  - Implement non-pharmacological measures as appropriate and evaluate response  - Consider cultural and social influences on pain and pain management  - Notify physician/advanced practitioner if interventions unsuccessful or patient reports new pain  Outcome: Adequate for Discharge     Problem: INFECTION - ADULT  Goal: Absence or prevention of progression during hospitalization  Description  INTERVENTIONS:  - Assess and monitor for signs and symptoms of infection  - Monitor lab/diagnostic results  - Monitor all insertion sites, i e  indwelling lines, tubes, and drains  - Monitor endotracheal if appropriate and nasal secretions for changes in amount and color  - [place] appropriate cooling/warming therapies per order  - Administer medications as ordered  - Instruct and encourage patient and family to use good hand hygiene technique  - Identify and instruct in appropriate isolation precautions for identified infection/condition  Outcome: Adequate for Discharge  Goal: Absence of fever/infection during neutropenic period  Description  INTERVENTIONS:  - Monitor WBC    Outcome: Adequate for Discharge     Problem: SAFETY ADULT  Goal: Maintain or return to baseline ADL function  Description  INTERVENTIONS:  -  Assess patient's ability to carry out ADLs; assess patient's baseline for ADL function and identify physical deficits which impact ability to perform ADLs (bathing, care of mouth/teeth, toileting, grooming, dressing, etc )  - Assess/evaluate cause of self-care deficits   - Assess range of motion  - Assess patient's mobility; develop plan if impaired  - Assess patient's need for assistive devices and provide as appropriate  - Encourage maximum independence but intervene and supervise when necessary  - Involve family in performance of ADLs  - Assess for home care needs following discharge   - Consider OT consult to assist with ADL evaluation and planning for discharge  - Provide patient education as appropriate  Outcome: Adequate for Discharge  Goal: Maintain or return mobility status to optimal level  Description  INTERVENTIONS:  - Assess patient's baseline mobility status (ambulation, transfers, stairs, etc )    - Identify cognitive and physical deficits and behaviors that affect mobility  - Identify mobility aids required to assist with transfers and/or ambulation (gait belt, sit-to-stand, lift, walker, cane, etc )  - [place] fall precautions as indicated by assessment  - Record patient progress and toleration of activity level on Mobility SBAR; progress patient to next Phase/Stage  - Instruct patient to call for assistance with activity based on assessment  - Consider rehabilitation consult to assist with strengthening/weightbearing, etc   Outcome: Adequate for Discharge     Problem: DISCHARGE PLANNING  Goal: Discharge to home or other facility with appropriate resources  Description  INTERVENTIONS:  - Identify barriers to discharge w/patient and caregiver  - Arrange for needed discharge resources and transportation as appropriate  - Identify discharge learning needs (meds, wound care, etc )  - Arrange for interpretive services to assist at discharge as needed  - Refer to Case Management Department for coordinating discharge planning if the patient needs post-hospital services based on physician/advanced practitioner order or complex needs related to functional status, cognitive ability, or social support system  Outcome: Adequate for Discharge     Problem: Knowledge Deficit  Goal: Patient/family/caregiver demonstrates understanding of disease process, treatment plan, medications, and discharge instructions  Description  Complete learning assessment and assess knowledge base    Interventions:  - Provide teaching at level of understanding  - Provide teaching via preferred learning methods  Outcome: Adequate for Discharge     Problem: SKIN/TISSUE INTEGRITY - ADULT  Goal: Skin integrity remains intact  Description  INTERVENTIONS  - Identify patients at risk for skin breakdown  - Assess and monitor skin integrity  - Assess and monitor nutrition and hydration status  - Monitor labs (i e  albumin)  - Assess for incontinence   - Turn and reposition patient  - Assist with mobility/ambulation  - Relieve pressure over bony prominences  - Avoid friction and shearing  - Provide appropriate hygiene as needed including keeping skin clean and dry  - Evaluate need for skin moisturizer/barrier cream  - Collaborate with interdisciplinary team (i e  Nutrition, Rehabilitation, etc )   - Patient/family teaching  Outcome: Adequate for Discharge  Goal: Oral mucous membranes remain intact  Description  INTERVENTIONS  - Assess oral mucosa and hygiene practices  - Implement preventative oral hygiene regimen  - Implement oral medicated treatments as ordered  - Initiate Nutrition services referral as needed  Outcome: Adequate for Discharge     Problem: MUSCULOSKELETAL - ADULT  Goal: Maintain or return mobility to safest level of function  Description  INTERVENTIONS:  - Assess patient's ability to carry out ADLs; assess patient's baseline for ADL function and identify physical deficits which impact ability to perform ADLs (bathing, care of mouth/teeth, toileting, grooming, dressing, etc )  - Assess/evaluate cause of self-care deficits   - Assess range of motion  - Assess patient's mobility  - Assess patient's need for assistive devices and provide as appropriate  - Encourage maximum independence but intervene and supervise when necessary  - Involve family in performance of ADLs  - Assess for home care needs following discharge   - Consider OT consult to assist with ADL evaluation and planning for discharge  - Provide patient education as appropriate  Outcome: Adequate for Discharge  Goal: Maintain proper alignment of affected body part  Description  INTERVENTIONS:  - Support, maintain and protect limb and body alignment  - Provide patient/ family with appropriate education  Outcome: Adequate for Discharge

## 2019-08-23 ENCOUNTER — TRANSITIONAL CARE MANAGEMENT (OUTPATIENT)
Dept: FAMILY MEDICINE CLINIC | Facility: CLINIC | Age: 49
End: 2019-08-23

## 2019-08-25 LAB
BACTERIA BLD CULT: NORMAL
BACTERIA BLD CULT: NORMAL

## 2019-10-02 ENCOUNTER — TELEPHONE (OUTPATIENT)
Dept: FAMILY MEDICINE CLINIC | Facility: CLINIC | Age: 49
End: 2019-10-02

## 2019-10-02 NOTE — TELEPHONE ENCOUNTER
LM advising that patient can take BP medication on the morning of appointment even though coming in for labs

## 2019-10-18 ENCOUNTER — OFFICE VISIT (OUTPATIENT)
Dept: FAMILY MEDICINE CLINIC | Facility: CLINIC | Age: 49
End: 2019-10-18
Payer: COMMERCIAL

## 2019-10-18 VITALS
TEMPERATURE: 97.3 F | HEART RATE: 76 BPM | HEIGHT: 75 IN | SYSTOLIC BLOOD PRESSURE: 112 MMHG | BODY MASS INDEX: 39.17 KG/M2 | DIASTOLIC BLOOD PRESSURE: 74 MMHG | RESPIRATION RATE: 16 BRPM | WEIGHT: 315 LBS

## 2019-10-18 DIAGNOSIS — Z00.00 ANNUAL PHYSICAL EXAM: Primary | ICD-10-CM

## 2019-10-18 PROCEDURE — 99396 PREV VISIT EST AGE 40-64: CPT | Performed by: FAMILY MEDICINE

## 2019-10-18 PROCEDURE — 36415 COLL VENOUS BLD VENIPUNCTURE: CPT | Performed by: FAMILY MEDICINE

## 2019-10-18 RX ORDER — COLCHICINE 0.6 MG/1
TABLET ORAL
COMMUNITY
Start: 2019-09-27 | End: 2020-02-29

## 2019-10-18 NOTE — PROGRESS NOTES
Assessment/Plan:  Fasting labs drawn as below  Patient declines flu vaccine  Patient to continue present treatment  He is instructed to follow a low-fat, low-salt and a low carbohydrate diet and get regular aerobic exercise 150 minutes per week  Weight loss encouraged  Diagnoses and all orders for this visit:    Annual physical exam  -     Glucose, fasting  -     Lipid panel  -     COTININE    Other orders  -     colchicine (COLCRYS) 0 6 mg tablet          Subjective:      Patient ID: Arleth Robledo is a 52 y o  male  Patient 51-year-old male who is here for annual physical exam for his health insurance plan and biometric testing including labs  Patient had DOT physical in July of this year  Patient has been feeling well overall  No regular exercise program although patient remains physically active at work  He declines flu vaccine  Discussed screening colonoscopy at age 48  The following portions of the patient's history were reviewed and updated as appropriate: allergies, current medications, past family history, past medical history, past social history, past surgical history and problem list     Review of Systems   Constitutional: Negative for activity change, appetite change, fatigue and unexpected weight change  HENT: Negative  Eyes: Negative  Respiratory: Negative for cough, chest tightness, shortness of breath and wheezing  Cardiovascular: Negative for chest pain, palpitations and leg swelling  Gastrointestinal: Negative for abdominal pain, blood in stool, constipation, diarrhea, nausea and vomiting  Endocrine: Negative for cold intolerance and heat intolerance  Genitourinary: Negative for difficulty urinating, dysuria, frequency, hematuria and urgency  Musculoskeletal: Positive for arthralgias  Negative for back pain, gait problem, joint swelling, myalgias, neck pain and neck stiffness  Skin: Negative      Neurological: Negative for dizziness, syncope, weakness, light-headedness and headaches  Hematological: Negative for adenopathy  Does not bruise/bleed easily  Psychiatric/Behavioral: Negative for decreased concentration, dysphoric mood and sleep disturbance  The patient is not nervous/anxious  Objective:      /74   Pulse 76   Temp (!) 97 3 °F (36 3 °C)   Resp 16   Ht 6' 3" (1 905 m)   Wt (!) 173 kg (381 lb)   BMI 47 62 kg/m²          Physical Exam   Constitutional: He is oriented to person, place, and time  He appears well-developed and well-nourished  HENT:   Head: Normocephalic  Right Ear: External ear normal    Left Ear: External ear normal    Nose: Nose normal    Mouth/Throat: Oropharynx is clear and moist    Eyes: Conjunctivae are normal  No scleral icterus  Neck: Neck supple  No thyromegaly present  Cardiovascular: Normal rate and regular rhythm  Pulmonary/Chest: Effort normal and breath sounds normal    Abdominal: Soft  There is no tenderness  Musculoskeletal: He exhibits no edema  Lymphadenopathy:     He has no cervical adenopathy  Neurological: He is alert and oriented to person, place, and time  Skin: Skin is warm and dry  Psychiatric: He has a normal mood and affect   His behavior is normal  Judgment and thought content normal

## 2019-10-19 LAB — SL AMB QUESTION: NORMAL

## 2019-10-23 LAB
CHOLEST SERPL-MCNC: 150 MG/DL
CHOLEST/HDLC SERPL: 3.8 (CALC)
COTININE SERPL-MCNC: <2 NG/ML
GLUCOSE SERPL-MCNC: 96 MG/DL (ref 65–99)
HDLC SERPL-MCNC: 39 MG/DL
LDLC SERPL CALC-MCNC: 95 MG/DL (CALC)
NICOTINE SERPL-MCNC: <2 NG/ML
NONHDLC SERPL-MCNC: 111 MG/DL (CALC)
REF LAB TEST NAME: NORMAL
REF LAB TEST: NORMAL
SL AMB CLIENT CONTACT: NORMAL
TRIGL SERPL-MCNC: 75 MG/DL

## 2020-02-03 DIAGNOSIS — I10 ESSENTIAL HYPERTENSION: ICD-10-CM

## 2020-02-03 DIAGNOSIS — M54.6 THORACIC BACK PAIN, UNSPECIFIED BACK PAIN LATERALITY, UNSPECIFIED CHRONICITY: ICD-10-CM

## 2020-02-03 RX ORDER — ENALAPRIL MALEATE 20 MG/1
20 TABLET ORAL DAILY
Qty: 30 TABLET | Refills: 5 | Status: SHIPPED | OUTPATIENT
Start: 2020-02-03 | End: 2020-08-02

## 2020-02-03 RX ORDER — CYCLOBENZAPRINE HCL 10 MG
10 TABLET ORAL
Qty: 30 TABLET | Refills: 5 | Status: SHIPPED | OUTPATIENT
Start: 2020-02-03 | End: 2020-08-02

## 2020-02-03 RX ORDER — AMLODIPINE BESYLATE 5 MG/1
5 TABLET ORAL DAILY
Qty: 30 TABLET | Refills: 5 | Status: SHIPPED | OUTPATIENT
Start: 2020-02-03 | End: 2020-08-02

## 2020-02-29 ENCOUNTER — NURSE TRIAGE (OUTPATIENT)
Dept: OTHER | Facility: OTHER | Age: 50
End: 2020-02-29

## 2020-02-29 DIAGNOSIS — M10.9 GOUT OF LEFT KNEE, UNSPECIFIED CAUSE, UNSPECIFIED CHRONICITY: Primary | ICD-10-CM

## 2020-02-29 RX ORDER — COLCHICINE 0.6 MG/1
0.6 TABLET ORAL DAILY
Qty: 30 TABLET | Refills: 0 | Status: SHIPPED | OUTPATIENT
Start: 2020-02-29 | End: 2020-03-01

## 2020-02-29 NOTE — TELEPHONE ENCOUNTER
Reason for Disposition   [1] MODERATE pain (e g , interferes with normal activities, limping) AND [2] present > 3 days    Answer Assessment - Initial Assessment Questions  1  LOCATION and RADIATION: "Where is the pain located?"       Left Knee pain    2  QUALITY: "What does the pain feel like?"  (e g , sharp, dull, aching, burning)      Aching and burning     3  SEVERITY: "How bad is the pain?" "What does it keep you from doing?"   (Scale 1-10; or mild, moderate, severe)    -  MILD (1-3): doesn't interfere with normal activities     -  MODERATE (4-7): interferes with normal activities (e g , work or school) or awakens from sleep, limping     -  SEVERE (8-10): excruciating pain, unable to do any normal activities, unable to walk      6/10     4  ONSET: "When did the pain start?" "Does it come and go, or is it there all the time?"      2 days ago  States the pain is constant    5  RECURRENT: "Have you had this pain before?" If so, ask: "When, and what happened then?"      States he has had pain like this before, due to hx of gout  6  SETTING: "Has there been any recent work, exercise or other activity that involved that part of the body?"       Denies     7  AGGRAVATING FACTORS: "What makes the knee pain worse?" (e g , walking, climbing stairs, running)      Bearing weight on knee    8  ASSOCIATED SYMPTOMS: "Is there any swelling or redness of the knee?"      Mild swelling, and warm to the touch  9  OTHER SYMPTOMS: "Do you have any other symptoms?" (e g , chest pain, difficulty breathing, fever, calf pain)      Denies    Patient stating has taken 2 days worth of Colchicine, and is out of his medication  Patient requesting a refill      Protocols used: KNEE PAIN-ADULT-

## 2020-02-29 NOTE — TELEPHONE ENCOUNTER
Regarding: Gout Pain  ----- Message from Allison Luna sent at 2/29/2020  4:45 PM EST -----  "My  is out of his colchicine and his gout pain is getting bad "

## 2020-03-01 DIAGNOSIS — M10.9 GOUT OF LEFT KNEE, UNSPECIFIED CAUSE, UNSPECIFIED CHRONICITY: ICD-10-CM

## 2020-03-01 RX ORDER — COLCHICINE 0.6 MG/1
TABLET ORAL
Qty: 30 TABLET | Refills: 1 | Status: SHIPPED | OUTPATIENT
Start: 2020-03-01 | End: 2020-05-11 | Stop reason: SDUPTHER

## 2020-05-11 DIAGNOSIS — M10.9 GOUT OF LEFT KNEE, UNSPECIFIED CAUSE, UNSPECIFIED CHRONICITY: ICD-10-CM

## 2020-05-11 RX ORDER — COLCHICINE 0.6 MG/1
0.6 TABLET ORAL DAILY
Qty: 30 TABLET | Refills: 1 | Status: SHIPPED | OUTPATIENT
Start: 2020-05-11 | End: 2020-08-01 | Stop reason: SDUPTHER

## 2020-07-13 ENCOUNTER — OFFICE VISIT (OUTPATIENT)
Dept: URGENT CARE | Facility: MEDICAL CENTER | Age: 50
End: 2020-07-13
Payer: COMMERCIAL

## 2020-07-13 VITALS
OXYGEN SATURATION: 96 % | TEMPERATURE: 99.3 F | RESPIRATION RATE: 18 BRPM | HEART RATE: 86 BPM | DIASTOLIC BLOOD PRESSURE: 66 MMHG | BODY MASS INDEX: 47.62 KG/M2 | SYSTOLIC BLOOD PRESSURE: 108 MMHG | HEIGHT: 75 IN

## 2020-07-13 DIAGNOSIS — S16.1XXA STRAIN OF NECK MUSCLE, INITIAL ENCOUNTER: Primary | ICD-10-CM

## 2020-07-13 PROCEDURE — 99214 OFFICE O/P EST MOD 30 MIN: CPT | Performed by: FAMILY MEDICINE

## 2020-07-13 RX ORDER — METHOCARBAMOL 500 MG/1
500 TABLET, FILM COATED ORAL 2 TIMES DAILY PRN
Qty: 20 TABLET | Refills: 0 | Status: SHIPPED | OUTPATIENT
Start: 2020-07-13 | End: 2020-09-11

## 2020-07-13 RX ORDER — NAPROXEN SODIUM 550 MG/1
550 TABLET ORAL 2 TIMES DAILY WITH MEALS
Qty: 20 TABLET | Refills: 0 | Status: SHIPPED | OUTPATIENT
Start: 2020-07-13 | End: 2020-08-31 | Stop reason: SDUPTHER

## 2020-07-13 NOTE — PROGRESS NOTES
[facility]        NAME: Chato Castellano is a 52 y o  male  : 1970    MRN: [id number]  DATE: 2020  TIME: 10:56 AM    Assessment and Plan   Strain of neck muscle, initial encounter [S16  1XXA]  1  Strain of neck muscle, initial encounter  naproxen sodium (ANAPROX) 550 mg tablet    methocarbamol (ROBAXIN) 500 mg tablet         Patient Instructions       Follow up with PCP in 3-5 days  Proceed to  ER if symptoms worsen  Chief Complaint     Chief Complaint   Patient presents with    Neck Pain     Patient relates started with neck pain and right shoulder since Saturday  Denies injury  Denies fever  Denies chest pain and shortness of breath  Patient was asked several times by front staff to please wear a face mask and refused  Patient asked to wait in his car and so nurse is able to triage him from his car and refused  Uncooperative and not wanting to place mask over his nose  History of Present Illness       80-year-old male here today with complaint of right neck and right shoulder pain for the last 2 days  Denies any recent trauma  Describes the pain is constant, it causes pain to travel into his shoulder  Denies numbness or weakness of the upper extremity  Pain is worse when he turns his neck or elevates his right shoulder  He is left-handed  He has been taking some over-the-counter ibuprofen with minimal improvement  Review of Systems   Review of Systems   Constitutional: Negative  Musculoskeletal: Positive for arthralgias  Neurological: Negative            Current Medications       Current Outpatient Medications:     amLODIPine (NORVASC) 5 mg tablet, Take 1 tablet (5 mg total) by mouth daily, Disp: 30 tablet, Rfl: 5    enalapril (VASOTEC) 20 mg tablet, Take 1 tablet (20 mg total) by mouth daily, Disp: 30 tablet, Rfl: 5    colchicine (COLCRYS) 0 6 mg tablet, Take 1 tablet (0 6 mg total) by mouth daily (Patient not taking: Reported on 2020), Disp: 30 tablet, Rfl: 1    cyclobenzaprine (FLEXERIL) 10 mg tablet, Take 1 tablet (10 mg total) by mouth daily at bedtime (Patient not taking: Reported on 7/13/2020), Disp: 30 tablet, Rfl: 5    methocarbamol (ROBAXIN) 500 mg tablet, Take 1 tablet (500 mg total) by mouth 2 (two) times a day as needed for muscle spasms, Disp: 20 tablet, Rfl: 0    naproxen sodium (ANAPROX) 550 mg tablet, Take 1 tablet (550 mg total) by mouth 2 (two) times a day with meals for 10 days, Disp: 20 tablet, Rfl: 0    Current Allergies     Allergies as of 07/13/2020    (No Known Allergies)            The following portions of the patient's history were reviewed and updated as appropriate: allergies, current medications, past family history, past medical history, past social history, past surgical history and problem list      Past Medical History:   Diagnosis Date    Arthritis     Gout     Hypertension        History reviewed  No pertinent surgical history  Family History   Problem Relation Age of Onset    No Known Problems Mother     No Known Problems Father          Medications have been verified  Objective   /66   Pulse 86   Temp 99 3 °F (37 4 °C) (Tympanic)   Resp 18   Ht 6' 3" (1 905 m)   SpO2 96%   BMI 47 62 kg/m²        Physical Exam     Physical Exam   Constitutional: He appears well-developed  Musculoskeletal: He exhibits tenderness  C-spine:  Flexion to approximately 45 degrees, extension lateral rotation side bend limited to 20 degrees  Point tenderness over the right trapezius muscle  Thoracic spine:  Point tenderness over the right paraspinal muscle  Extremities:  Upper-good strength and tone, 5/5  Vitals reviewed

## 2020-07-13 NOTE — PATIENT INSTRUCTIONS
No cervical radiculopathy present  I prescribed naproxen 550 mg b i d  With food, Robaxin 500 mg b i d  He was warned about somnolence from Robaxin  I encouraged patient to continue with heating pad as needed  Lytes neck stretching activities  Follow up with PCP symptoms persist or worsen  Cervical Strain   WHAT YOU NEED TO KNOW:   A cervical strain is a stretched or torn muscle or tendon in your neck  Tendons are strong tissues that connect muscles to bones  Common causes of cervical strains include a car accident, a fall, or a sports injury  DISCHARGE INSTRUCTIONS:   Return to the emergency department if:   · You have pain or numbness from your shoulder down to your hand  · You have problems with your vision, hearing, or balance  · You feel confused or cannot concentrate  · You have problems with movement and strength  Contact your healthcare provider if:   · You have increased swelling or pain in your neck  · You have questions or concerns about your condition or care  Medicines: You may need any of the following:  · Acetaminophen  decreases pain and fever  It is available without a doctor's order  Ask how much to take and how often to take it  Follow directions  Read the labels of all other medicines you are using to see if they also contain acetaminophen, or ask your doctor or pharmacist  Acetaminophen can cause liver damage if not taken correctly  Do not use more than 4 grams (4,000 milligrams) total of acetaminophen in one day  · NSAIDs , such as ibuprofen, help decrease swelling, pain, and fever  This medicine is available with or without a doctor's order  NSAIDs can cause stomach bleeding or kidney problems in certain people  If you take blood thinner medicine, always ask your healthcare provider if NSAIDs are safe for you  Always read the medicine label and follow directions  · Muscle relaxers  help decrease pain and muscle spasms      · Prescription pain medicine  may be given  Ask your healthcare provider how to take this medicine safely  Some prescription pain medicines contain acetaminophen  Do not take other medicines that contain acetaminophen without talking to your healthcare provider  Too much acetaminophen may cause liver damage  Prescription pain medicine may cause constipation  Ask your healthcare provider how to prevent or treat constipation  · Take your medicine as directed  Contact your healthcare provider if you think your medicine is not helping or if you have side effects  Tell him or her if you are allergic to any medicine  Keep a list of the medicines, vitamins, and herbs you take  Include the amounts, and when and why you take them  Bring the list or the pill bottles to follow-up visits  Carry your medicine list with you in case of an emergency  Manage your symptoms:   · Apply heat  on your neck for 15 to 20 minutes, 4 to 6 times a day or as directed  Heat helps decrease pain, stiffness, and muscle spasms  · Begin gentle neck exercises  as soon as you can move your neck without pain  Exercises will help decrease stiffness and improve the strength and movement of your neck  Ask your healthcare provider what kind of exercises you should do  · Gradually return to your usual activities as directed  Stop if you have pain  Avoid activities that can cause more damage to your neck, such as heavy lifting or strenuous exercise  · Sleep without a pillow  to help decrease pain  Instead, roll a small towel tightly and place it under your neck  · Go to physical therapy as directed  A physical therapist teaches you exercises to help improve movement and strength, and to decrease pain  Prevent neck injury:   · Drive safely  Make sure everyone in your car wears a seatbelt  A seatbelt can save your life if you are in an accident  Do not use your cell phone when you are driving  This could distract you and cause an accident   Pull over if you need to make a call or send a text message  · Wear helmets, lifejackets, and protective gear  Always wear a helmet when you ride a bike or motorcycle, go skiing, or play sports that could cause a head injury  Wear protective equipment when you play sports  Wear a lifejacket when you are on a boat or doing water sports  Follow up with your healthcare provider as directed: You may be referred to an orthopedist or physical therapies  Write down your questions so you remember to ask them during your visits  © 2017 [facility] Information is for End User's use only and may not be sold, redistributed or otherwise used for commercial purposes  All illustrations and images included in CareNotes® are the copyrighted property of [employer] , [employer]  or [employer]  The above information is an  only  It is not intended as medical advice for individual conditions or treatments  Talk to your doctor, nurse or pharmacist before following any medical regimen to see if it is safe and effective for you

## 2020-07-13 NOTE — LETTER
July 13, 2020     Patient: Aleksandra Valles   YOB: 1970   Date of Visit: 7/13/2020       To Whom It May Concern: It is my medical opinion that Aleksandra Valles may return to work on 7/15/2020  If you have any questions or concerns, please don't hesitate to call           Sincerely,        Ivonne Starkey MD    CC: No Recipients

## 2020-07-24 ENCOUNTER — HOSPITAL ENCOUNTER (EMERGENCY)
Facility: HOSPITAL | Age: 50
Discharge: HOME | End: 2020-07-25
Attending: EMERGENCY MEDICINE | Admitting: EMERGENCY MEDICINE
Payer: COMMERCIAL

## 2020-07-24 DIAGNOSIS — T78.40XA ACUTE ALLERGIC REACTION: Primary | ICD-10-CM

## 2020-07-24 DIAGNOSIS — T78.3XXA: ICD-10-CM

## 2020-07-24 DIAGNOSIS — L50.9 URTICARIA: ICD-10-CM

## 2020-07-24 PROCEDURE — 99283 EMERGENCY DEPT VISIT LOW MDM: CPT

## 2020-07-24 PROCEDURE — 99284 EMERGENCY DEPT VISIT MOD MDM: CPT | Performed by: EMERGENCY MEDICINE

## 2020-07-24 PROCEDURE — 96375 TX/PRO/DX INJ NEW DRUG ADDON: CPT

## 2020-07-24 PROCEDURE — 96374 THER/PROPH/DIAG INJ IV PUSH: CPT

## 2020-07-24 PROCEDURE — 96372 THER/PROPH/DIAG INJ SC/IM: CPT

## 2020-07-24 RX ORDER — EPINEPHRINE 1 MG/ML
0.3 INJECTION, SOLUTION, CONCENTRATE INTRAVENOUS ONCE
Status: COMPLETED | OUTPATIENT
Start: 2020-07-24 | End: 2020-07-24

## 2020-07-24 RX ORDER — METHYLPREDNISOLONE SODIUM SUCCINATE 125 MG/2ML
125 INJECTION, POWDER, LYOPHILIZED, FOR SOLUTION INTRAMUSCULAR; INTRAVENOUS ONCE
Status: COMPLETED | OUTPATIENT
Start: 2020-07-24 | End: 2020-07-25

## 2020-07-24 RX ORDER — DIPHENHYDRAMINE HYDROCHLORIDE 50 MG/ML
50 INJECTION INTRAMUSCULAR; INTRAVENOUS ONCE
Status: COMPLETED | OUTPATIENT
Start: 2020-07-24 | End: 2020-07-24

## 2020-07-24 RX ADMIN — FAMOTIDINE 20 MG: 10 INJECTION, SOLUTION INTRAVENOUS at 23:39

## 2020-07-24 RX ADMIN — EPINEPHRINE 0.3 MG: 1 INJECTION, SOLUTION, CONCENTRATE INTRAVENOUS at 23:37

## 2020-07-24 RX ADMIN — DIPHENHYDRAMINE HYDROCHLORIDE 50 MG: 50 INJECTION, SOLUTION INTRAMUSCULAR; INTRAVENOUS at 23:42

## 2020-07-25 VITALS
HEART RATE: 70 BPM | SYSTOLIC BLOOD PRESSURE: 114 MMHG | TEMPERATURE: 99 F | DIASTOLIC BLOOD PRESSURE: 66 MMHG | BODY MASS INDEX: 46.9 KG/M2 | WEIGHT: 315 LBS | OXYGEN SATURATION: 96 % | RESPIRATION RATE: 16 BRPM

## 2020-07-25 PROCEDURE — 96375 TX/PRO/DX INJ NEW DRUG ADDON: CPT

## 2020-07-25 RX ORDER — PREDNISONE 20 MG/1
40 TABLET ORAL DAILY
Qty: 10 TABLET | Refills: 0 | Status: SHIPPED | OUTPATIENT
Start: 2020-07-25 | End: 2020-07-30

## 2020-07-25 RX ORDER — DIPHENHYDRAMINE HCL 25 MG
50 TABLET ORAL EVERY 8 HOURS PRN
Qty: 20 TABLET | Refills: 0 | Status: SHIPPED | OUTPATIENT
Start: 2020-07-25 | End: 2022-01-16

## 2020-07-25 RX ORDER — EPINEPHRINE 0.3 MG/.3ML
0.3 INJECTION SUBCUTANEOUS ONCE
Qty: 0.6 ML | Refills: 0 | Status: SHIPPED | OUTPATIENT
Start: 2020-07-25 | End: 2021-07-03

## 2020-07-25 RX ADMIN — METHYLPREDNISOLONE SODIUM SUCCINATE 125 MG: 125 INJECTION, POWDER, FOR SOLUTION INTRAMUSCULAR; INTRAVENOUS at 00:04

## 2020-07-25 NOTE — ED PROVIDER NOTES
History  Chief Complaint   Patient presents with    Allergic Reaction     Pt reports arms and eyes itching, reports noted hands are swelling and feels like throat is closing  53 yo male who went to sleep at 2100 and woke up at 2200 with itching diffusely over extremities - worse over hands, throat feels like its closing, eyes itchy  Lungs clear, pt clearly uncomfortable, itching uncontrollably  On exam hands and arms erythematous, hands mildly swollen, post pharynx with mild edema, lungs clear  Pt ate shrimp and chicken chinese dish at 1730 "but I always eat that"  Denies any new exposures  History provided by:  Patient   used: No    Allergic Reaction   Presenting symptoms: difficulty swallowing (throat closing), itching, rash (itching) and swelling    Presenting symptoms: no wheezing    Difficulty swallowing:     Severity:  Mild    Onset quality:  Gradual    Duration: past 30 minutes since I woke up"feels like my throat is closing"    Timing:  Constant  Itching:     Location:  Arm, hand, leg and foot    Severity:  Severe    Onset quality:  Gradual    Duration: 30 min  Timing:  Constant  Rash:     Location:  Arm and hand    Quality: redness      Severity:  Moderate    Duration: 30 min  Timing:  Constant  Severity:  Mild (hands)  Duration:  30 minutes  Relieved by:  Nothing  Worsened by:  Nothing  Ineffective treatments:  None tried      Prior to Admission Medications   Prescriptions Last Dose Informant Patient Reported? Taking?    amLODIPine (NORVASC) 5 mg tablet   No Yes   Sig: Take 1 tablet (5 mg total) by mouth daily   colchicine (COLCRYS) 0 6 mg tablet   No Yes   Sig: Take 1 tablet (0 6 mg total) by mouth daily   cyclobenzaprine (FLEXERIL) 10 mg tablet Not Taking at Unknown time  No No   Sig: Take 1 tablet (10 mg total) by mouth daily at bedtime   Patient not taking: Reported on 7/13/2020   enalapril (VASOTEC) 20 mg tablet Not Taking at Unknown time  No No   Sig: Take 1 tablet (20 mg total) by mouth daily   Patient not taking: Reported on 7/24/2020   methocarbamol (ROBAXIN) 500 mg tablet Not Taking at Unknown time  No No   Sig: Take 1 tablet (500 mg total) by mouth 2 (two) times a day as needed for muscle spasms   Patient not taking: Reported on 7/24/2020   naproxen sodium (ANAPROX) 550 mg tablet   No No   Sig: Take 1 tablet (550 mg total) by mouth 2 (two) times a day with meals for 10 days      Facility-Administered Medications: None       Past Medical History:   Diagnosis Date    Arthritis     Gout     Hypertension        History reviewed  No pertinent surgical history  Family History   Problem Relation Age of Onset    No Known Problems Mother     No Known Problems Father      I have reviewed and agree with the history as documented  E-Cigarette/Vaping     E-Cigarette/Vaping Substances     Social History     Tobacco Use    Smoking status: Former Smoker    Smokeless tobacco: Never Used    Tobacco comment: Never a smoker as per [employer]   Substance Use Topics    Alcohol use: Not Currently     Comment: rarely; No alcohol use as per [employer]    Drug use: No       Review of Systems   Constitutional: Negative for chills and fever  HENT: Positive for trouble swallowing (throat closing)  Negative for congestion, facial swelling, sore throat and voice change  Eyes: Negative for visual disturbance  Respiratory: Negative for shortness of breath and wheezing  Cardiovascular: Negative for chest pain and palpitations  Gastrointestinal: Negative for abdominal pain, diarrhea, nausea and vomiting  Genitourinary: Negative for dysuria  Musculoskeletal: Negative for neck pain and neck stiffness  Skin: Positive for itching and rash (itching)  Negative for pallor  Neurological: Negative for headaches  Psychiatric/Behavioral: Negative for confusion  All other systems reviewed and are negative        Physical Exam  Physical Exam   Constitutional: He is oriented to person, place, and time  He appears well-developed and well-nourished  He appears distressed (anxious, itching uncontrollably)  HENT:   Head: Normocephalic and atraumatic  Right Ear: External ear normal    Left Ear: External ear normal    Mild post pharyngeal edema, no facial angioedema   Eyes: Pupils are equal, round, and reactive to light  Conjunctivae and EOM are normal    Neck: Normal range of motion  Neck supple  Cardiovascular: Normal rate and regular rhythm  No murmur heard  Pulmonary/Chest: Effort normal and breath sounds normal    Abdominal: Soft  Bowel sounds are normal  He exhibits no distension  There is no tenderness  Musculoskeletal: Normal range of motion  He exhibits no edema  Neurological: He is alert and oriented to person, place, and time  Skin: Skin is warm  Rash (erythema b/l hands with mild swelling) noted  No pallor  Psychiatric: He has a normal mood and affect  His behavior is normal    Nursing note and vitals reviewed        Vital Signs  ED Triage Vitals [07/24/20 2322]   Temperature Pulse Respirations Blood Pressure SpO2   99 °F (37 2 °C) 77 18 161/90 95 %      Temp Source Heart Rate Source Patient Position - Orthostatic VS BP Location FiO2 (%)   Temporal Monitor Sitting Right arm --      Pain Score       --           Vitals:    07/24/20 2322 07/25/20 0038 07/25/20 0201 07/25/20 0300   BP: 161/90 128/92 128/68 114/66   Pulse: 77 77 71 70   Patient Position - Orthostatic VS: Sitting Lying Sitting Lying         Visual Acuity      ED Medications  Medications   EPINEPHrine PF (ADRENALIN) 1 mg/mL injection 0 3 mg (0 3 mg Intramuscular Given 7/24/20 2337)   diphenhydrAMINE (BENADRYL) injection 50 mg (50 mg Intravenous Given 7/24/20 2342)   famotidine (PEPCID) injection 20 mg (20 mg Intravenous Given 7/24/20 2339)   methylPREDNISolone sodium succinate (Solu-MEDROL) injection 125 mg (125 mg Intravenous Given 7/25/20 0004)       Diagnostic Studies  Results Reviewed     None No orders to display              Procedures  Procedures         ED Course  ED Course as of Jul 25 0352   Fri Jul 24, 2020   2326 Pt seen and examined  53 yo male who went to sleep at 2100 and woke up at 2200 with itching diffusely over extremities - worse over hands, throat feels like its closing, eyes itchy  Lungs clear, pt clearly uncomfortable, itching uncontrollably  On exam hands and arms erythematous, hands mildly swollen, post pharynx with mild edema, lungs clear  Will give IVF, benadryl, pepcid, epi and solumedrol and observe  Sat Jul 25, 2020   0043 Pt resting comfortably, no longer itchy, blotchy hives resolving  Post pharynx mildly edematous  Will continue to assess until 4 hour guru  0232 Pt feeling much better, post pharynx clear, no edema  Will d c home at 0300 with scripts for steroids, epi pen, benadryl and to return if symptoms worsen, otherwise f/u with PCP        0302 Pt feels great, will d/c home  MDM      Disposition  Final diagnoses:   Acute allergic reaction   Allergic angioedema   Urticaria     Time reflects when diagnosis was documented in both MDM as applicable and the Disposition within this note     Time User Action Codes Description Comment    7/25/2020  1:17 AM Som LAGOS Add [T78 40XA] Acute allergic reaction     7/25/2020  1:17 AM Antwon Kang Str  74  3XXA] Allergic angioedema     7/25/2020  1:17 AM Som LAGOS Add [L50 9] Urticaria       ED Disposition     ED Disposition Condition Date/Time Comment    Discharge Stable Sat Jul 25, 2020  1:17 AM Ronak Lisa discharge to home/self care              Follow-up Information     Follow up With Specialties Details Why Contact Info Additional Information    Beatriz Mancilla, DO Family Medicine  As needed [address] [phone]       [address] [facility] Emergency Medicine  If symptoms worsen [address] [place] [id number] [phone] [address] [facility] ED, [address] [facility] [address]  , [place], [place], [address]          Discharge Medication List as of 7/25/2020  1:20 AM      START taking these medications    Details   diphenhydrAMINE (BENADRYL) 25 mg tablet Take 2 tablets (50 mg total) by mouth every 8 (eight) hours as needed for itching, Starting Sat 7/25/2020, Normal      EPINEPHrine (EPIPEN) 0 3 mg/0 3 mL SOAJ Inject 0 3 mL (0 3 mg total) into a muscle once for 1 dose, Starting Sat 7/25/2020, Normal      predniSONE 20 mg tablet Take 2 tablets (40 mg total) by mouth daily for 5 days, Starting Sat 7/25/2020, Until Thu 7/30/2020, Normal         CONTINUE these medications which have NOT CHANGED    Details   amLODIPine (NORVASC) 5 mg tablet Take 1 tablet (5 mg total) by mouth daily, Starting Mon 2/3/2020, Normal      colchicine (COLCRYS) 0 6 mg tablet Take 1 tablet (0 6 mg total) by mouth daily, Starting Mon 5/11/2020, Normal      cyclobenzaprine (FLEXERIL) 10 mg tablet Take 1 tablet (10 mg total) by mouth daily at bedtime, Starting Mon 2/3/2020, Normal      enalapril (VASOTEC) 20 mg tablet Take 1 tablet (20 mg total) by mouth daily, Starting Mon 2/3/2020, Normal      methocarbamol (ROBAXIN) 500 mg tablet Take 1 tablet (500 mg total) by mouth 2 (two) times a day as needed for muscle spasms, Starting Mon 7/13/2020, Normal      naproxen sodium (ANAPROX) 550 mg tablet Take 1 tablet (550 mg total) by mouth 2 (two) times a day with meals for 10 days, Starting Mon 7/13/2020, Until Thu 7/23/2020, Normal           No discharge procedures on file      PDMP Review     None          ED Provider  Electronically Signed by           Lizzy Barragan DO  07/25/20 1841

## 2020-08-01 DIAGNOSIS — M10.9 GOUT OF LEFT KNEE, UNSPECIFIED CAUSE, UNSPECIFIED CHRONICITY: ICD-10-CM

## 2020-08-01 DIAGNOSIS — I10 ESSENTIAL HYPERTENSION: ICD-10-CM

## 2020-08-01 DIAGNOSIS — M54.6 THORACIC BACK PAIN, UNSPECIFIED BACK PAIN LATERALITY, UNSPECIFIED CHRONICITY: ICD-10-CM

## 2020-08-01 RX ORDER — COLCHICINE 0.6 MG/1
TABLET ORAL
Qty: 30 TABLET | Refills: 0 | Status: SHIPPED | OUTPATIENT
Start: 2020-08-01 | End: 2020-08-31 | Stop reason: SDUPTHER

## 2020-08-02 RX ORDER — AMLODIPINE BESYLATE 5 MG/1
TABLET ORAL
Qty: 30 TABLET | Refills: 0 | Status: SHIPPED | OUTPATIENT
Start: 2020-08-02 | End: 2020-08-31 | Stop reason: SDUPTHER

## 2020-08-02 RX ORDER — ENALAPRIL MALEATE 20 MG/1
TABLET ORAL
Qty: 30 TABLET | Refills: 0 | Status: SHIPPED | OUTPATIENT
Start: 2020-08-02 | End: 2020-09-06 | Stop reason: SDUPTHER

## 2020-08-02 RX ORDER — CYCLOBENZAPRINE HCL 10 MG
TABLET ORAL
Qty: 30 TABLET | Refills: 0 | Status: SHIPPED | OUTPATIENT
Start: 2020-08-02 | End: 2020-08-31 | Stop reason: SDUPTHER

## 2020-08-31 DIAGNOSIS — S16.1XXA STRAIN OF NECK MUSCLE, INITIAL ENCOUNTER: ICD-10-CM

## 2020-08-31 DIAGNOSIS — M54.6 THORACIC BACK PAIN, UNSPECIFIED BACK PAIN LATERALITY, UNSPECIFIED CHRONICITY: ICD-10-CM

## 2020-08-31 DIAGNOSIS — M10.9 GOUT OF LEFT KNEE, UNSPECIFIED CAUSE, UNSPECIFIED CHRONICITY: ICD-10-CM

## 2020-08-31 DIAGNOSIS — I10 ESSENTIAL HYPERTENSION: ICD-10-CM

## 2020-08-31 RX ORDER — NAPROXEN SODIUM 550 MG/1
550 TABLET ORAL 2 TIMES DAILY WITH MEALS
Qty: 20 TABLET | Refills: 0 | Status: SHIPPED | OUTPATIENT
Start: 2020-08-31 | End: 2020-09-11

## 2020-08-31 RX ORDER — AMLODIPINE BESYLATE 5 MG/1
5 TABLET ORAL DAILY
Qty: 30 TABLET | Refills: 0 | Status: SHIPPED | OUTPATIENT
Start: 2020-08-31 | End: 2020-09-29 | Stop reason: SDUPTHER

## 2020-08-31 RX ORDER — CYCLOBENZAPRINE HCL 10 MG
10 TABLET ORAL
Qty: 30 TABLET | Refills: 0 | Status: SHIPPED | OUTPATIENT
Start: 2020-08-31 | End: 2020-09-29 | Stop reason: SDUPTHER

## 2020-08-31 RX ORDER — COLCHICINE 0.6 MG/1
0.6 TABLET ORAL DAILY
Qty: 30 TABLET | Refills: 0 | Status: SHIPPED | OUTPATIENT
Start: 2020-08-31 | End: 2020-09-29 | Stop reason: SDUPTHER

## 2020-09-06 ENCOUNTER — NURSE TRIAGE (OUTPATIENT)
Dept: OTHER | Facility: OTHER | Age: 50
End: 2020-09-06

## 2020-09-06 DIAGNOSIS — I10 ESSENTIAL HYPERTENSION: ICD-10-CM

## 2020-09-06 RX ORDER — ENALAPRIL MALEATE 20 MG/1
20 TABLET ORAL DAILY
Qty: 30 TABLET | Refills: 0 | Status: SHIPPED | OUTPATIENT
Start: 2020-09-06 | End: 2020-09-29 | Stop reason: SDUPTHER

## 2020-09-06 NOTE — TELEPHONE ENCOUNTER
Reason for Disposition   [1] Request for URGENT new prescription or refill of "essential" medication (i e , likelihood of harm to patient if not taken) AND [2] triager unable to fill per unit policy    Answer Assessment - Initial Assessment Questions  1  NAME of MEDICATION: "What medicine are you calling about?"      Enalapril 20 mg   2  QUESTION: "What is your question?"      Patient is run out of the medication      Protocols used: MEDICATION QUESTION CALL-ADULT-

## 2020-09-06 NOTE — TELEPHONE ENCOUNTER
Dr Eloisa Nobles on call was made aware of that patient is off Enalapril 20 mg  Per Dr Eloisa Nobles, to call in pharmacy Enalapril 20 mg, 1 tab by moth daily, #30, no refill  Patient to call PCP office when it is open to follow up  Enalapril was called in to pharmacy  Patient's wife was made aware of

## 2020-09-06 NOTE — TELEPHONE ENCOUNTER
Regarding: Blood Pressure Meds   ----- Message from Chyna Ortiz sent at 9/6/2020  2:45 PM EDT -----  "My  is completely out of his blood pressure Rx Anaprox 550 mg tablet and he can't wait until Tuesday"

## 2020-09-11 ENCOUNTER — OFFICE VISIT (OUTPATIENT)
Dept: FAMILY MEDICINE CLINIC | Facility: CLINIC | Age: 50
End: 2020-09-11
Payer: COMMERCIAL

## 2020-09-11 VITALS
BODY MASS INDEX: 39.17 KG/M2 | WEIGHT: 315 LBS | DIASTOLIC BLOOD PRESSURE: 82 MMHG | HEIGHT: 75 IN | SYSTOLIC BLOOD PRESSURE: 110 MMHG | RESPIRATION RATE: 16 BRPM | HEART RATE: 76 BPM | OXYGEN SATURATION: 95 % | TEMPERATURE: 96.2 F

## 2020-09-11 DIAGNOSIS — Z00.00 ANNUAL PHYSICAL EXAM: Primary | ICD-10-CM

## 2020-09-11 DIAGNOSIS — I10 ESSENTIAL HYPERTENSION: ICD-10-CM

## 2020-09-11 DIAGNOSIS — Z12.5 SCREENING PSA (PROSTATE SPECIFIC ANTIGEN): ICD-10-CM

## 2020-09-11 DIAGNOSIS — E55.9 VITAMIN D DEFICIENCY: ICD-10-CM

## 2020-09-11 DIAGNOSIS — E66.01 CLASS 3 SEVERE OBESITY DUE TO EXCESS CALORIES WITH SERIOUS COMORBIDITY AND BODY MASS INDEX (BMI) OF 45.0 TO 49.9 IN ADULT (HCC): ICD-10-CM

## 2020-09-11 PROBLEM — E66.813 CLASS 3 SEVERE OBESITY DUE TO EXCESS CALORIES WITH SERIOUS COMORBIDITY AND BODY MASS INDEX (BMI) OF 45.0 TO 49.9 IN ADULT: Status: ACTIVE | Noted: 2020-09-11

## 2020-09-11 PROCEDURE — 3079F DIAST BP 80-89 MM HG: CPT | Performed by: FAMILY MEDICINE

## 2020-09-11 PROCEDURE — 3725F SCREEN DEPRESSION PERFORMED: CPT | Performed by: FAMILY MEDICINE

## 2020-09-11 PROCEDURE — 3074F SYST BP LT 130 MM HG: CPT | Performed by: FAMILY MEDICINE

## 2020-09-11 PROCEDURE — 1036F TOBACCO NON-USER: CPT | Performed by: FAMILY MEDICINE

## 2020-09-11 PROCEDURE — 99396 PREV VISIT EST AGE 40-64: CPT | Performed by: FAMILY MEDICINE

## 2020-09-11 NOTE — PROGRESS NOTES
Assessment/Plan:  Patient declines flu vaccine today  Fasting labs drawn as below  Patient to continue present treatment  Patient instructed to follow a low-fat, low-salt and a low-carbohydrate diet and get regular aerobic exercise 150 minutes per week  Weight loss encouraged  Recommend colonoscopy although patient declines at this time  Discussed Cologuard testing although he declines at this time  Return to the office in 6 months  Diagnoses and all orders for this visit:    Annual physical exam  -     CBC and Platelet; Future  -     Comprehensive metabolic panel; Future  -     Lipid panel; Future  -     COTININE; Future    Essential hypertension  -     CBC and Platelet; Future  -     Comprehensive metabolic panel; Future  -     Lipid panel; Future  -     TSH, 3rd generation with Free T4 reflex; Future  -     UA w Reflex to Microscopic w Reflex to Culture - Clinic Collect    Class 3 severe obesity due to excess calories with serious comorbidity and body mass index (BMI) of 45 0 to 49 9 in adult (HCC)  -     TSH, 3rd generation with Free T4 reflex; Future    Vitamin D deficiency  -     Vitamin D 25 hydroxy; Future    Screening PSA (prostate specific antigen)  -     PSA, Total Screen          Subjective:      Patient ID: Parviz Macario is a 48 y o  male  Patient is here for annual physical exam for his health insurance plan and requires Continine level  He also requests full fasting labs  Patient works 55 hours a week as a  and had his last DOT physical in June of 2019 which he gets every 2 years  Patient quit smoking greater than 20 years ago  Patient declines flu vaccine today  Patient declines screening colonoscopy or Cologuard at this time but plans to complete in the future  Family history is negative for coronary artery disease, colon or prostate cancer and positive for diabetes  No regular exercise program although patient remains physically active at work        The following portions of the patient's history were reviewed and updated as appropriate: allergies, current medications, past family history, past medical history, past social history, past surgical history and problem list     Review of Systems   Constitutional: Negative for activity change, appetite change, chills, diaphoresis, fatigue, fever and unexpected weight change  HENT: Negative  Eyes: Negative  Respiratory: Negative for cough, chest tightness, shortness of breath and wheezing  Cardiovascular: Negative for chest pain, palpitations and leg swelling  Gastrointestinal: Negative for abdominal pain, blood in stool, constipation, diarrhea, nausea and vomiting  Endocrine: Negative for cold intolerance and heat intolerance  Genitourinary: Negative for difficulty urinating, dysuria, frequency and hematuria  Musculoskeletal: Positive for arthralgias and back pain  Negative for gait problem, joint swelling, myalgias, neck pain and neck stiffness  Skin: Negative  Neurological: Negative for dizziness, syncope, weakness, light-headedness and headaches  Hematological: Negative for adenopathy  Does not bruise/bleed easily  Psychiatric/Behavioral: Negative for decreased concentration, dysphoric mood and sleep disturbance  The patient is not nervous/anxious  Objective:      /82   Pulse 76   Temp (!) 96 2 °F (35 7 °C) (Temporal)   Resp 16   Ht 6' 3" (1 905 m)   Wt (!) 167 kg (369 lb)   SpO2 95%   BMI 46 12 kg/m²          Physical Exam  Constitutional:       General: He is not in acute distress  Appearance: Normal appearance  He is obese  HENT:      Head: Normocephalic  Mouth/Throat:      Mouth: Mucous membranes are moist    Eyes:      General: No scleral icterus  Conjunctiva/sclera: Conjunctivae normal    Neck:      Musculoskeletal: Neck supple  Vascular: No carotid bruit  Cardiovascular:      Rate and Rhythm: Normal rate and regular rhythm     Pulmonary: Effort: Pulmonary effort is normal       Breath sounds: Normal breath sounds  Abdominal:      Palpations: Abdomen is soft  Tenderness: There is no abdominal tenderness  Musculoskeletal:      Right lower leg: No edema  Left lower leg: No edema  Lymphadenopathy:      Cervical: No cervical adenopathy  Skin:     General: Skin is warm and dry  Neurological:      General: No focal deficit present  Mental Status: He is alert and oriented to person, place, and time  Psychiatric:         Mood and Affect: Mood normal          BMI Counseling: Body mass index is 46 12 kg/m²  The BMI is above normal  Nutrition recommendations include reducing portion sizes, decreasing overall calorie intake, 3-5 servings of fruits/vegetables daily, reducing fast food intake, consuming healthier snacks, decreasing soda and/or juice intake, moderation in carbohydrate intake and reducing intake of saturated fat and trans fat  Exercise recommendations include moderate aerobic physical activity for 150 minutes/week

## 2020-09-15 LAB
25(OH)D3 SERPL-MCNC: 31 NG/ML (ref 30–100)
ALBUMIN SERPL-MCNC: 4.2 G/DL (ref 3.6–5.1)
ALBUMIN/GLOB SERPL: 1.1 (CALC) (ref 1–2.5)
ALP SERPL-CCNC: 72 U/L (ref 35–144)
ALT SERPL-CCNC: 88 U/L (ref 9–46)
APPEARANCE UR: CLEAR
AST SERPL-CCNC: 63 U/L (ref 10–35)
BACTERIA UR QL AUTO: ABNORMAL /HPF
BILIRUB SERPL-MCNC: 0.7 MG/DL (ref 0.2–1.2)
BILIRUB UR QL STRIP: NEGATIVE
BUN SERPL-MCNC: 14 MG/DL (ref 7–25)
BUN/CREAT SERPL: ABNORMAL (CALC) (ref 6–22)
CALCIUM SERPL-MCNC: 9.4 MG/DL (ref 8.6–10.3)
CHLORIDE SERPL-SCNC: 101 MMOL/L (ref 98–110)
CHOLEST SERPL-MCNC: 158 MG/DL
CHOLEST/HDLC SERPL: 4.3 (CALC)
CO2 SERPL-SCNC: 28 MMOL/L (ref 20–32)
COLOR UR: YELLOW
COTININE UR QL: NORMAL
CREAT SERPL-MCNC: 0.72 MG/DL (ref 0.7–1.33)
ERYTHROCYTE [DISTWIDTH] IN BLOOD BY AUTOMATED COUNT: 13.8 % (ref 11–15)
GLOBULIN SER CALC-MCNC: 3.9 G/DL (CALC) (ref 1.9–3.7)
GLUCOSE SERPL-MCNC: 103 MG/DL (ref 65–99)
GLUCOSE UR QL STRIP: NEGATIVE
HCT VFR BLD AUTO: 48.7 % (ref 38.5–50)
HDLC SERPL-MCNC: 37 MG/DL
HGB BLD-MCNC: 16.3 G/DL (ref 13.2–17.1)
HGB UR QL STRIP: NEGATIVE
HYALINE CASTS #/AREA URNS LPF: ABNORMAL /LPF
KETONES UR QL STRIP: NEGATIVE
LDLC SERPL CALC-MCNC: 102 MG/DL (CALC)
LEUKOCYTE ESTERASE UR QL STRIP: ABNORMAL
MCH RBC QN AUTO: 30.2 PG (ref 27–33)
MCHC RBC AUTO-ENTMCNC: 33.5 G/DL (ref 32–36)
MCV RBC AUTO: 90.2 FL (ref 80–100)
NITRITE UR QL STRIP: NEGATIVE
NONHDLC SERPL-MCNC: 121 MG/DL (CALC)
PH UR STRIP: 7 [PH] (ref 5–8)
PLATELET # BLD AUTO: 233 THOUSAND/UL (ref 140–400)
PMV BLD REES-ECKER: 11.7 FL (ref 7.5–12.5)
POTASSIUM SERPL-SCNC: 4.8 MMOL/L (ref 3.5–5.3)
PROT SERPL-MCNC: 8.1 G/DL (ref 6.1–8.1)
PROT UR QL STRIP: NEGATIVE
PSA SERPL-MCNC: 0.2 NG/ML
RBC # BLD AUTO: 5.4 MILLION/UL (ref 4.2–5.8)
RBC #/AREA URNS HPF: ABNORMAL /HPF
SL AMB EGFR AFRICAN AMERICAN: 126 ML/MIN/1.73M2
SL AMB EGFR NON AFRICAN AMERICAN: 109 ML/MIN/1.73M2
SODIUM SERPL-SCNC: 138 MMOL/L (ref 135–146)
SP GR UR STRIP: 1.02 (ref 1–1.03)
SQUAMOUS #/AREA URNS HPF: ABNORMAL /HPF
TRIGL SERPL-MCNC: 95 MG/DL
TSH SERPL-ACNC: 1.52 MIU/L (ref 0.4–4.5)
WBC # BLD AUTO: 5.9 THOUSAND/UL (ref 3.8–10.8)
WBC #/AREA URNS HPF: ABNORMAL /HPF

## 2020-09-17 DIAGNOSIS — Z00.00 ANNUAL PHYSICAL EXAM: Primary | ICD-10-CM

## 2020-09-18 ENCOUNTER — TELEPHONE (OUTPATIENT)
Dept: FAMILY MEDICINE CLINIC | Facility: CLINIC | Age: 50
End: 2020-09-18

## 2020-09-18 NOTE — TELEPHONE ENCOUNTER
Per phone call from Matheus Deleon pt wife  Employer stated that he can use the results from last year  No further testing needed

## 2020-09-29 DIAGNOSIS — M10.9 GOUT OF LEFT KNEE, UNSPECIFIED CAUSE, UNSPECIFIED CHRONICITY: ICD-10-CM

## 2020-09-29 DIAGNOSIS — M54.6 THORACIC BACK PAIN, UNSPECIFIED BACK PAIN LATERALITY, UNSPECIFIED CHRONICITY: ICD-10-CM

## 2020-09-29 DIAGNOSIS — I10 ESSENTIAL HYPERTENSION: ICD-10-CM

## 2020-09-29 RX ORDER — COLCHICINE 0.6 MG/1
0.6 TABLET ORAL DAILY
Qty: 30 TABLET | Refills: 0 | Status: SHIPPED | OUTPATIENT
Start: 2020-09-29 | End: 2020-11-11 | Stop reason: SDUPTHER

## 2020-09-29 RX ORDER — AMLODIPINE BESYLATE 5 MG/1
5 TABLET ORAL DAILY
Qty: 30 TABLET | Refills: 0 | Status: SHIPPED | OUTPATIENT
Start: 2020-09-29 | End: 2020-10-30 | Stop reason: SDUPTHER

## 2020-09-29 RX ORDER — ENALAPRIL MALEATE 20 MG/1
20 TABLET ORAL DAILY
Qty: 30 TABLET | Refills: 0 | Status: SHIPPED | OUTPATIENT
Start: 2020-09-29 | End: 2020-10-30 | Stop reason: SDUPTHER

## 2020-09-29 RX ORDER — CYCLOBENZAPRINE HCL 10 MG
10 TABLET ORAL
Qty: 30 TABLET | Refills: 0 | Status: SHIPPED | OUTPATIENT
Start: 2020-09-29 | End: 2020-10-30 | Stop reason: SDUPTHER

## 2020-09-29 NOTE — TELEPHONE ENCOUNTER
Patient states he is moving tomorrow and would like to have the medications before he moves if possible

## 2020-10-30 DIAGNOSIS — M54.6 THORACIC BACK PAIN, UNSPECIFIED BACK PAIN LATERALITY, UNSPECIFIED CHRONICITY: ICD-10-CM

## 2020-10-30 DIAGNOSIS — I10 ESSENTIAL HYPERTENSION: ICD-10-CM

## 2020-10-30 RX ORDER — CYCLOBENZAPRINE HCL 10 MG
10 TABLET ORAL
Qty: 30 TABLET | Refills: 0 | Status: SHIPPED | OUTPATIENT
Start: 2020-10-30 | End: 2020-12-01 | Stop reason: SDUPTHER

## 2020-10-30 RX ORDER — ENALAPRIL MALEATE 20 MG/1
20 TABLET ORAL DAILY
Qty: 30 TABLET | Refills: 0 | Status: SHIPPED | OUTPATIENT
Start: 2020-10-30 | End: 2020-12-01 | Stop reason: SDUPTHER

## 2020-10-30 RX ORDER — AMLODIPINE BESYLATE 5 MG/1
5 TABLET ORAL DAILY
Qty: 30 TABLET | Refills: 0 | Status: SHIPPED | OUTPATIENT
Start: 2020-10-30 | End: 2020-12-01 | Stop reason: SDUPTHER

## 2020-11-11 DIAGNOSIS — M10.9 GOUT OF LEFT KNEE, UNSPECIFIED CAUSE, UNSPECIFIED CHRONICITY: ICD-10-CM

## 2020-11-11 RX ORDER — COLCHICINE 0.6 MG/1
0.6 TABLET ORAL DAILY
Qty: 30 TABLET | Refills: 3 | Status: SHIPPED | OUTPATIENT
Start: 2020-11-11 | End: 2021-04-23

## 2020-12-01 DIAGNOSIS — I10 ESSENTIAL HYPERTENSION: ICD-10-CM

## 2020-12-01 DIAGNOSIS — M54.6 THORACIC BACK PAIN, UNSPECIFIED BACK PAIN LATERALITY, UNSPECIFIED CHRONICITY: ICD-10-CM

## 2020-12-01 RX ORDER — CYCLOBENZAPRINE HCL 10 MG
10 TABLET ORAL
Qty: 30 TABLET | Refills: 5 | Status: SHIPPED | OUTPATIENT
Start: 2020-12-01 | End: 2021-06-04

## 2020-12-01 RX ORDER — ENALAPRIL MALEATE 20 MG/1
20 TABLET ORAL DAILY
Qty: 30 TABLET | Refills: 5 | Status: SHIPPED | OUTPATIENT
Start: 2020-12-01 | End: 2021-06-04

## 2020-12-01 RX ORDER — AMLODIPINE BESYLATE 5 MG/1
5 TABLET ORAL DAILY
Qty: 30 TABLET | Refills: 5 | Status: SHIPPED | OUTPATIENT
Start: 2020-12-01 | End: 2021-06-04

## 2021-01-20 ENCOUNTER — HOSPITAL ENCOUNTER (EMERGENCY)
Facility: HOSPITAL | Age: 51
Discharge: HOME | End: 2021-01-20
Attending: EMERGENCY MEDICINE | Admitting: EMERGENCY MEDICINE
Payer: COMMERCIAL

## 2021-01-20 ENCOUNTER — APPOINTMENT (EMERGENCY)
Dept: RADIOLOGY | Facility: HOSPITAL | Age: 51
End: 2021-01-20
Payer: COMMERCIAL

## 2021-01-20 VITALS
RESPIRATION RATE: 18 BRPM | DIASTOLIC BLOOD PRESSURE: 64 MMHG | TEMPERATURE: 97 F | WEIGHT: 315 LBS | BODY MASS INDEX: 47.34 KG/M2 | OXYGEN SATURATION: 98 % | SYSTOLIC BLOOD PRESSURE: 128 MMHG | HEART RATE: 89 BPM

## 2021-01-20 DIAGNOSIS — M79.675 PAIN OF TOE OF LEFT FOOT: Primary | ICD-10-CM

## 2021-01-20 PROCEDURE — 99284 EMERGENCY DEPT VISIT MOD MDM: CPT | Performed by: PHYSICIAN ASSISTANT

## 2021-01-20 PROCEDURE — 73630 X-RAY EXAM OF FOOT: CPT

## 2021-01-20 PROCEDURE — 99283 EMERGENCY DEPT VISIT LOW MDM: CPT

## 2021-01-20 RX ORDER — PREDNISONE 20 MG/1
40 TABLET ORAL DAILY
Qty: 8 TABLET | Refills: 0 | Status: SHIPPED | OUTPATIENT
Start: 2021-01-20 | End: 2021-01-24

## 2021-01-20 RX ORDER — PREDNISONE 20 MG/1
40 TABLET ORAL ONCE
Status: COMPLETED | OUTPATIENT
Start: 2021-01-20 | End: 2021-01-20

## 2021-01-20 RX ADMIN — PREDNISONE 40 MG: 20 TABLET ORAL at 18:12

## 2021-01-20 NOTE — DISCHARGE INSTRUCTIONS
Please take steroid as prescribed  Rest the foot  Please follow-up with your family doctor for re-evaluation  If you have any new or worsening symptoms or signs of infection that we discussed in the emergency department please return immediately    Thank you for allowing me to be part of your care team today

## 2021-01-20 NOTE — ED NOTES
Pt in no acute distress  Ambulates with a steady gait   Verbalizes understanding of discharge instructions       Manohar Ly RN  01/20/21 1900

## 2021-01-20 NOTE — Clinical Note
Jose Lopez was seen and treated in our emergency department on 1/20/2021  Diagnosis:     Jesus Martines  may return to work on return date  He may return on this date: 01/22/2021         If you have any questions or concerns, please don't hesitate to call        Lindsay Salomon PA-C    ______________________________           _______________          _______________  Hospital Representative                              Date                                Time

## 2021-01-20 NOTE — ED PROVIDER NOTES
History  Chief Complaint   Patient presents with    Toe Injury     pt states dropped something on foot yesterday and now has pain in joint of big toe of L foot  warm to touch, swollen, red  27-year-old male presents emergency department for evaluation of left great toe pain  Patient states he dropped a box on his foot while wearing boots yesterday  States he had minimal pain at the time  Reports he woke up this morning and great toe was red and swollen and painful  States he has decreased range of motion  Patient is not diabetic  He does report history of gout and states he took colchicine this morning without significant improvement of symptoms  Patient additionally reports history of arthritis and states he took Aleve arthritis this morning without significant improvement of symptoms  Patient denies any fevers or chills  He denies any wounds on his foot  He denies any weakness, numbness, paresthesias  History provided by:  Patient  Toe Pain  Location:  Left great toe  Onset quality:  Sudden  Timing:  Constant  Associated symptoms: no abdominal pain, no chest pain, no congestion, no cough, no diarrhea, no ear pain, no fatigue, no fever, no headaches, no loss of consciousness, no myalgias, no nausea, no rash, no rhinorrhea, no shortness of breath, no sore throat, no vomiting and no wheezing        Prior to Admission Medications   Prescriptions Last Dose Informant Patient Reported? Taking?    EPINEPHrine (EPIPEN) 0 3 mg/0 3 mL SOAJ   No No   Sig: Inject 0 3 mL (0 3 mg total) into a muscle once for 1 dose   amLODIPine (NORVASC) 5 mg tablet   No No   Sig: Take 1 tablet (5 mg total) by mouth daily   colchicine (COLCRYS) 0 6 mg tablet   No No   Sig: Take 1 tablet (0 6 mg total) by mouth daily   cyclobenzaprine (FLEXERIL) 10 mg tablet   No No   Sig: Take 1 tablet (10 mg total) by mouth daily at bedtime   diphenhydrAMINE (BENADRYL) 25 mg tablet  Self No No   Sig: Take 2 tablets (50 mg total) by mouth every 8 (eight) hours as needed for itching   Patient not taking: Reported on 9/11/2020   enalapril (VASOTEC) 20 mg tablet   No No   Sig: Take 1 tablet (20 mg total) by mouth daily      Facility-Administered Medications: None       Past Medical History:   Diagnosis Date    Arthritis     Cellulitis     Gout     Hypertension        Past Surgical History:   Procedure Laterality Date    TONSILLECTOMY         Family History   Problem Relation Age of Onset    No Known Problems Mother     No Known Problems Father      I have reviewed and agree with the history as documented  E-Cigarette/Vaping    E-Cigarette Use Never User      E-Cigarette/Vaping Substances     Social History     Tobacco Use    Smoking status: Former Smoker    Smokeless tobacco: Never Used    Tobacco comment: Never a smoker as per [employer]   Substance Use Topics    Alcohol use: Not Currently     Comment: rarely; No alcohol use as per [employer]    Drug use: No       Review of Systems   Constitutional: Negative  Negative for appetite change, chills, diaphoresis, fatigue and fever  HENT: Negative  Negative for congestion, ear pain, rhinorrhea and sore throat  Eyes: Negative for visual disturbance  Respiratory: Negative  Negative for cough, shortness of breath and wheezing  Cardiovascular: Negative  Negative for chest pain  Gastrointestinal: Negative  Negative for abdominal pain, diarrhea, nausea and vomiting  Genitourinary: Negative  Musculoskeletal: Positive for arthralgias  Negative for myalgias  Skin: Positive for color change  Negative for pallor, rash and wound  Neurological: Negative  Negative for loss of consciousness and headaches  All other systems reviewed and are negative  Physical Exam  Physical Exam  Vitals signs and nursing note reviewed  Constitutional:       General: He is not in acute distress  Appearance: Normal appearance  He is normal weight   He is not ill-appearing, toxic-appearing or diaphoretic  HENT:      Head: Normocephalic and atraumatic  Nose: Nose normal  No congestion  Mouth/Throat:      Mouth: Mucous membranes are moist       Pharynx: Oropharynx is clear  Eyes:      Conjunctiva/sclera: Conjunctivae normal       Pupils: Pupils are equal, round, and reactive to light  Neck:      Musculoskeletal: Normal range of motion  Cardiovascular:      Pulses:           Dorsalis pedis pulses are 2+ on the right side and 2+ on the left side  Posterior tibial pulses are 2+ on the right side and 2+ on the left side  Musculoskeletal:         General: Swelling ( left great toe), tenderness ( left great toe tenderness with moderate palpation) and deformity ( left great toe) present  No signs of injury  Right lower leg: No edema  Left lower leg: No edema  Comments: Left great toe is mildly erythematous, swollen and tender  Normal cap refill  Decreased range of motion due to discomfort  No red streaking away from the area  No warmth  No wounds  Skin:     General: Skin is warm and dry  Capillary Refill: Capillary refill takes less than 2 seconds  Coloration: Skin is not pale  Findings: Erythema (Left great toe) present  No bruising, lesion or rash  Neurological:      General: No focal deficit present  Mental Status: He is alert and oriented to person, place, and time     Psychiatric:         Mood and Affect: Mood normal          Behavior: Behavior normal          Vital Signs  ED Triage Vitals [01/20/21 1709]   Temperature Pulse Respirations Blood Pressure SpO2   (!) 97 °F (36 1 °C) 89 18 128/64 98 %      Temp Source Heart Rate Source Patient Position - Orthostatic VS BP Location FiO2 (%)   Temporal Monitor Sitting Left arm --      Pain Score       8           Vitals:    01/20/21 1709   BP: 128/64   Pulse: 89   Patient Position - Orthostatic VS: Sitting         Visual Acuity      ED Medications  Medications   predniSONE tablet 40 mg (40 mg Oral Given 1/20/21 1812)       Diagnostic Studies  Results Reviewed     None                 XR foot 3+ vw left   ED Interpretation by Patti Brar PA-C (01/20 1759)   No acute fracture or dislocation       by Manny Arce (01/20 1721)                 Procedures  Procedures         ED Course  ED Course as of Jan 20 1911 Wed Jan 20, 2021 1759 ED interpretation of x-ray negative for acute fracture dislocation      1759 I discussed results and findings with patient  Will prescribe course of prednisone have patient follow-up with his family doctor for re-evaluation in 3-5 days  Patient was educated on symptomatic treatment  He was offered cane or walker refused  We discussed symptoms that require prompt return to the ED for further evaluation and patient verbalized understanding  He agreed with treatment plan and was discharged home  MDM  Number of Diagnoses or Management Options  Pain of toe of left foot: new and requires workup  Diagnosis management comments: 51-year-old male presents emergency department with left great toe pain  Did drop box on the foot yesterday  Vitals in medical record reviewed  Left great toe is minimally swollen erythematous with decreased range of motion and tender to moderate palpation  No warmth  No wounds  Normal cap refill  No red streaking up the foot  No concern for infection at this time  ED interpretation of x-ray negative for acute fracture dislocation  Will treat as gout flare with prednisone and have patient follow-up with his family doctor  He was offered crutches or walker which he refused  He agreed with this treatment plan remained well emergency department and was discharged home        Disposition  Final diagnoses:   Pain of toe of left foot     Time reflects when diagnosis was documented in both MDM as applicable and the Disposition within this note     Time User Action Codes Description Comment    1/20/2021  6:00 PM Thiensarah Wang Add [G19 [id number]] Pain of toe of left foot       ED Disposition     ED Disposition Condition Date/Time Comment    Discharge Stable Wed Jan 20, 2021  6:00 PM Ronak Lisa discharge to home/self care  Follow-up Information     Follow up With Specialties Details Why Contact Sukh Calderon, DO Family Medicine In 3 days For re-evaluation [id number]  [phone]            Discharge Medication List as of 1/20/2021  6:01 PM      START taking these medications    Details   predniSONE 20 mg tablet Take 2 tablets (40 mg total) by mouth daily for 4 days, Starting Wed 1/20/2021, Until Sun 1/24/2021, Normal         CONTINUE these medications which have NOT CHANGED    Details   amLODIPine (NORVASC) 5 mg tablet Take 1 tablet (5 mg total) by mouth daily, Starting Tue 12/1/2020, Normal      colchicine (COLCRYS) 0 6 mg tablet Take 1 tablet (0 6 mg total) by mouth daily, Starting Wed 11/11/2020, Normal      cyclobenzaprine (FLEXERIL) 10 mg tablet Take 1 tablet (10 mg total) by mouth daily at bedtime, Starting Tue 12/1/2020, Normal      diphenhydrAMINE (BENADRYL) 25 mg tablet Take 2 tablets (50 mg total) by mouth every 8 (eight) hours as needed for itching, Starting Sat 7/25/2020, Normal      enalapril (VASOTEC) 20 mg tablet Take 1 tablet (20 mg total) by mouth daily, Starting Tue 12/1/2020, Normal      EPINEPHrine (EPIPEN) 0 3 mg/0 3 mL SOAJ Inject 0 3 mL (0 3 mg total) into a muscle once for 1 dose, Starting Sat 7/25/2020, Normal           No discharge procedures on file      PDMP Review     None          ED Provider  Electronically Signed by           Tess Hardy PA-C  01/20/21 6218

## 2021-04-05 ENCOUNTER — HOSPITAL ENCOUNTER (EMERGENCY)
Facility: HOSPITAL | Age: 51
Discharge: HOME | End: 2021-04-05
Attending: EMERGENCY MEDICINE
Payer: COMMERCIAL

## 2021-04-05 VITALS
BODY MASS INDEX: 39.17 KG/M2 | HEART RATE: 85 BPM | DIASTOLIC BLOOD PRESSURE: 84 MMHG | RESPIRATION RATE: 20 BRPM | OXYGEN SATURATION: 94 % | SYSTOLIC BLOOD PRESSURE: 137 MMHG | TEMPERATURE: 97.6 F | WEIGHT: 315 LBS | HEIGHT: 75 IN

## 2021-04-05 DIAGNOSIS — S86.912A MUSCLE STRAIN OF LEFT LOWER LEG, INITIAL ENCOUNTER: Primary | ICD-10-CM

## 2021-04-05 PROCEDURE — 99283 EMERGENCY DEPT VISIT LOW MDM: CPT

## 2021-04-05 PROCEDURE — 99284 EMERGENCY DEPT VISIT MOD MDM: CPT | Performed by: EMERGENCY MEDICINE

## 2021-04-05 NOTE — DISCHARGE INSTRUCTIONS
The history that you provided is concerning for the possibility of a blood clot in your leg  You should obtain an ultrasound of the leg to rule this condition out  Although you have chosen today not to obtain an ultrasound you are welcome to return at any time for further re-evaluation

## 2021-04-05 NOTE — ED PROVIDER NOTES
History  Chief Complaint   Patient presents with    Leg Pain     pt is a  c/o left leg pain behind his knee x1 week that worsens with walking  denies injury and sob     Patient complains of 1 week of left posterior leg pain the area of the lower thigh, he drives a truck for a living and states the pain is worse when he steps in an out of the truck and when he walks  He denies any known injury  He denies chest pain or shortness of breath  He denies numbness or tingling  He describes the pain as a muscle pulling      History provided by:  Patient   used: No    Leg Pain  Location:  Leg  Injury: no    Leg location:  L leg and L lower leg  Pain details:     Quality:  Aching    Radiates to:  Does not radiate    Severity:  Moderate    Onset quality:  Gradual    Duration:  1 week    Timing:  Constant  Chronicity:  New  Foreign body present:  No foreign bodies  Prior injury to area:  No  Relieved by:  Nothing  Exacerbated by: Walking  Ineffective treatments:  NSAIDs  Associated symptoms: no back pain, no decreased ROM, no fever, no muscle weakness, no neck pain, no numbness, no stiffness, no swelling and no tingling    Risk factors: obesity        Prior to Admission Medications   Prescriptions Last Dose Informant Patient Reported? Taking?    EPINEPHrine (EPIPEN) 0 3 mg/0 3 mL SOAJ   No No   Sig: Inject 0 3 mL (0 3 mg total) into a muscle once for 1 dose   amLODIPine (NORVASC) 5 mg tablet   No No   Sig: Take 1 tablet (5 mg total) by mouth daily   colchicine (COLCRYS) 0 6 mg tablet   No No   Sig: Take 1 tablet (0 6 mg total) by mouth daily   cyclobenzaprine (FLEXERIL) 10 mg tablet   No No   Sig: Take 1 tablet (10 mg total) by mouth daily at bedtime   diphenhydrAMINE (BENADRYL) 25 mg tablet  Self No No   Sig: Take 2 tablets (50 mg total) by mouth every 8 (eight) hours as needed for itching   Patient not taking: Reported on 9/11/2020   enalapril (VASOTEC) 20 mg tablet   No No   Sig: Take 1 tablet (20 mg total) by mouth daily      Facility-Administered Medications: None       Past Medical History:   Diagnosis Date    Arthritis     Cellulitis     Gout     Hypertension        Past Surgical History:   Procedure Laterality Date    TONSILLECTOMY         Family History   Problem Relation Age of Onset    No Known Problems Mother     No Known Problems Father      I have reviewed and agree with the history as documented  E-Cigarette/Vaping    E-Cigarette Use Never User      E-Cigarette/Vaping Substances     Social History     Tobacco Use    Smoking status: Former Smoker    Smokeless tobacco: Never Used    Tobacco comment: Never a smoker as per [employer]   Substance Use Topics    Alcohol use: Not Currently     Comment: rarely; No alcohol use as per [employer]    Drug use: No       Review of Systems   Constitutional: Negative for chills and fever  HENT: Negative for ear pain, hearing loss, sore throat, trouble swallowing and voice change  Eyes: Negative for pain and discharge  Respiratory: Negative for cough, shortness of breath and wheezing  Cardiovascular: Negative for chest pain and palpitations  Gastrointestinal: Negative for abdominal pain, blood in stool, constipation, diarrhea, nausea and vomiting  Genitourinary: Negative for dysuria, flank pain, frequency and hematuria  Musculoskeletal: Negative for back pain, joint swelling, neck pain, neck stiffness and stiffness  Skin: Negative for rash and wound  Neurological: Negative for dizziness, seizures, syncope, facial asymmetry and headaches  Psychiatric/Behavioral: Negative for hallucinations, self-injury and suicidal ideas  All other systems reviewed and are negative  Physical Exam  Physical Exam  Vitals signs and nursing note reviewed  Constitutional:       General: He is not in acute distress  Appearance: He is well-developed  He is obese  HENT:      Head: Normocephalic and atraumatic        Right Ear: External ear normal       Left Ear: External ear normal    Eyes:      Conjunctiva/sclera: Conjunctivae normal       Pupils: Pupils are equal, round, and reactive to light  Neck:      Musculoskeletal: Normal range of motion and neck supple  Cardiovascular:      Rate and Rhythm: Normal rate and regular rhythm  Heart sounds: Normal heart sounds  No murmur  Pulmonary:      Effort: Pulmonary effort is normal       Breath sounds: Normal breath sounds  No wheezing or rales  Abdominal:      General: Bowel sounds are normal  There is no distension  Palpations: Abdomen is soft  Tenderness: There is no abdominal tenderness  There is no guarding or rebound  Musculoskeletal: Normal range of motion  General: No deformity  Comments: No deformity or tenderness in the left knee  No significant palpable mass in the posterior left leg  No significant swelling of the left leg  Patient ambulates unassisted without difficulty   Skin:     General: Skin is warm and dry  Findings: No rash  Neurological:      General: No focal deficit present  Mental Status: He is alert and oriented to person, place, and time  Cranial Nerves: No cranial nerve deficit     Psychiatric:         Behavior: Behavior normal          Vital Signs  ED Triage Vitals   Temperature Pulse Respirations Blood Pressure SpO2   04/05/21 1708 04/05/21 1708 04/05/21 1709 04/05/21 1709 04/05/21 1709   97 6 °F (36 4 °C) 85 20 137/84 94 %      Temp Source Heart Rate Source Patient Position - Orthostatic VS BP Location FiO2 (%)   04/05/21 1708 04/05/21 1708 04/05/21 1709 04/05/21 1709 --   Temporal Monitor Sitting Right arm       Pain Score       --                  Vitals:    04/05/21 1708 04/05/21 1709   BP:  137/84   Pulse: 85    Patient Position - Orthostatic VS:  Sitting         Visual Acuity      ED Medications  Medications - No data to display    Diagnostic Studies  Results Reviewed     None                 No orders to display              Procedures  Procedures         ED Course                             SBIRT 22yo+      Most Recent Value   SBIRT (24 yo +)   In order to provide better care to our patients, we are screening all of our patients for alcohol and drug use  Would it be okay to ask you these screening questions? Yes Filed at: 04/05/2021 1711   Initial Alcohol Screen: US AUDIT-C    1  How often do you have a drink containing alcohol?  0 Filed at: 04/05/2021 1711   2  How many drinks containing alcohol do you have on a typical day you are drinking? 0 Filed at: 04/05/2021 1711   3a  Male UNDER 65: How often do you have five or more drinks on one occasion? 0 Filed at: 04/05/2021 1711   3b  FEMALE Any Age, or MALE 65+: How often do you have 4 or more drinks on one occassion? 0 Filed at: 04/05/2021 1711   Audit-C Score  0 Filed at: 04/05/2021 1711   LOIDA: How many times in the past year have you    Used an illegal drug or used a prescription medication for non-medical reasons? Never Filed at: 04/05/2021 1711                    MDM  Number of Diagnoses or Management Options  Muscle strain of left lower leg, initial encounter:   Diagnosis management comments: After initial evaluation, explained to the patient that he would require a Doppler to rule out DVT in the lower extremity  I explained that his profession as a  increases his risk for DVT  Immediately upon my explaining this the patient stated well I am not getting an ultrasound stating that he was sure he did not have a blood clot  I tried to commence the patient to obtain the ultrasound but he continued to refuse  Following this, the patient refused further treatment  He did request an x-ray  I explained to him that an x-ray would be of low utility since the bony examination was normal and the x-ray was unlikely to provide further useful information  I offered to obtain the x-ray in any case but the patient refused    The patient seemed angry at the time of discharge  I did encourage the patient to return at any time for re-evaluation if he felt he would consent to evaluation for DVT      Disposition  Final diagnoses:   Muscle strain of left lower leg, initial encounter     Time reflects when diagnosis was documented in both MDM as applicable and the Disposition within this note     Time User Action Codes Description Comment    4/5/2021  5:12 PM Ping Reveles Add [D08 [id number]] Muscle strain of left lower leg, initial encounter       ED Disposition     ED Disposition Condition Date/Time Comment    Discharge Stable Mon Apr 5, 2021  5:12 PM Ronak Lisa discharge to home/self care  Follow-up Information     Follow up With Specialties Details Why [address], DO Family Medicine   [address]  [id number] [facility]  [phone]            Discharge Medication List as of 4/5/2021  5:13 PM      CONTINUE these medications which have NOT CHANGED    Details   amLODIPine (NORVASC) 5 mg tablet Take 1 tablet (5 mg total) by mouth daily, Starting Tue 12/1/2020, Normal      colchicine (COLCRYS) 0 6 mg tablet Take 1 tablet (0 6 mg total) by mouth daily, Starting Wed 11/11/2020, Normal      cyclobenzaprine (FLEXERIL) 10 mg tablet Take 1 tablet (10 mg total) by mouth daily at bedtime, Starting Tue 12/1/2020, Normal      diphenhydrAMINE (BENADRYL) 25 mg tablet Take 2 tablets (50 mg total) by mouth every 8 (eight) hours as needed for itching, Starting Sat 7/25/2020, Normal      enalapril (VASOTEC) 20 mg tablet Take 1 tablet (20 mg total) by mouth daily, Starting Tue 12/1/2020, Normal      EPINEPHrine (EPIPEN) 0 3 mg/0 3 mL SOAJ Inject 0 3 mL (0 3 mg total) into a muscle once for 1 dose, Starting Sat 7/25/2020, Normal           No discharge procedures on file      PDMP Review     None          ED Provider  Electronically Signed by           Ebony Galeana MD  04/05/21 9292

## 2021-04-23 ENCOUNTER — OFFICE VISIT (OUTPATIENT)
Dept: OBGYN CLINIC | Facility: CLINIC | Age: 51
End: 2021-04-23
Payer: COMMERCIAL

## 2021-04-23 VITALS
RESPIRATION RATE: 20 BRPM | BODY MASS INDEX: 39.17 KG/M2 | DIASTOLIC BLOOD PRESSURE: 70 MMHG | SYSTOLIC BLOOD PRESSURE: 106 MMHG | HEART RATE: 78 BPM | TEMPERATURE: 96 F | HEIGHT: 75 IN | WEIGHT: 315 LBS

## 2021-04-23 DIAGNOSIS — M79.605 LEFT LEG PAIN: Primary | ICD-10-CM

## 2021-04-23 DIAGNOSIS — M10.9 GOUT OF LEFT KNEE, UNSPECIFIED CAUSE, UNSPECIFIED CHRONICITY: ICD-10-CM

## 2021-04-23 DIAGNOSIS — R20.2 LEFT LEG PARESTHESIAS: ICD-10-CM

## 2021-04-23 PROBLEM — M54.16 RADICULOPATHY, LUMBAR REGION: Status: ACTIVE | Noted: 2021-04-23

## 2021-04-23 PROBLEM — M54.16 RADICULOPATHY, LUMBAR REGION: Status: RESOLVED | Noted: 2021-04-23 | Resolved: 2021-04-23

## 2021-04-23 PROCEDURE — 99203 OFFICE O/P NEW LOW 30 MIN: CPT | Performed by: ORTHOPAEDIC SURGERY

## 2021-04-23 PROCEDURE — 3008F BODY MASS INDEX DOCD: CPT | Performed by: ORTHOPAEDIC SURGERY

## 2021-04-23 PROCEDURE — 1036F TOBACCO NON-USER: CPT | Performed by: ORTHOPAEDIC SURGERY

## 2021-04-23 RX ORDER — METHYLPREDNISOLONE 4 MG/1
TABLET ORAL
Qty: 1 EACH | Refills: 0 | Status: SHIPPED | OUTPATIENT
Start: 2021-04-23 | End: 2021-07-01

## 2021-04-23 RX ORDER — COLCHICINE 0.6 MG/1
TABLET ORAL
Qty: 30 TABLET | Refills: 0 | Status: SHIPPED | OUTPATIENT
Start: 2021-04-23 | End: 2021-06-04

## 2021-04-23 NOTE — PROGRESS NOTES
ASSESSMENT/PLAN:    Diagnoses and all orders for this visit:    Left leg pain  -     methylPREDNISolone 4 MG tablet therapy pack; Use as directed on package  -     Ambulatory referral to Physical Therapy; Future    Left leg paresthesias        Plan:  I discussed his treatment options  A Medrol Dosepak was prescribed after he requested indicating that this had helped an episode of foot pain in the past   I have recommended physical therapy and a prescription was placed in his chart  I will have him follow-up with Dr Abdiarshid Fink in approximately 4 weeks  He was given a note that he may return to work as of Monday  Although some of the symptoms might suggest a hamstring strain, his clinical examination was more consistent with radicular symptoms  If symptoms are not improving, further diagnostic workup would be appropriate  Return for  4 weeks with Dr Abdirashid Fink       _____________________________________________________  CHIEF COMPLAINT:  Chief Complaint   Patient presents with    Left Knee - Pain    Knee Pain     left knee onset 3 weeks- denies injuries, felt his muscle pull         SUBJECTIVE:  Ab Angulo is a 48y o  year old male who presents for evaluation of posterior left thigh pain  He denies injury although he states that he does lift items while working and may have caused harm while working  However, he cannot describe any specific episode  Symptoms have been present for about 3 weeks  He believes that they improve over the weekend when he is not working and less active but then progressively worsen as the week goes on  Typically, by Friday, he has significant pain  He has noted paresthesias radiating from the posterior thigh distally to the dorsal foot over the last few days  He denies any back pain  He denies any right lower extremity complaints  He denies bowel or bladder dysfunction    He was seen in the emergency room at [facility] for this complaint but no treatment was recommended  He now presents for orthopedic evaluation and treatment  PAST MEDICAL HISTORY:  Past Medical History:   Diagnosis Date    Arthritis     Cellulitis     Gout     Hypertension        PAST SURGICAL HISTORY:  Past Surgical History:   Procedure Laterality Date    TONSILLECTOMY         FAMILY HISTORY:  Family History   Problem Relation Age of Onset    No Known Problems Mother     No Known Problems Father        SOCIAL HISTORY:  Social History     Tobacco Use    Smoking status: Former Smoker    Smokeless tobacco: Never Used    Tobacco comment: Never a smoker as per [employer]   Substance Use Topics    Alcohol use: Yes     Comment: rarely; No alcohol use as per [employer]    Drug use: No       MEDICATIONS:    Current Outpatient Medications:     amLODIPine (NORVASC) 5 mg tablet, Take 1 tablet (5 mg total) by mouth daily, Disp: 30 tablet, Rfl: 5    colchicine (COLCRYS) 0 6 mg tablet, Take 1 tablet (0 6 mg total) by mouth daily, Disp: 30 tablet, Rfl: 3    cyclobenzaprine (FLEXERIL) 10 mg tablet, Take 1 tablet (10 mg total) by mouth daily at bedtime, Disp: 30 tablet, Rfl: 5    diphenhydrAMINE (BENADRYL) 25 mg tablet, Take 2 tablets (50 mg total) by mouth every 8 (eight) hours as needed for itching, Disp: 20 tablet, Rfl: 0    enalapril (VASOTEC) 20 mg tablet, Take 1 tablet (20 mg total) by mouth daily, Disp: 30 tablet, Rfl: 5    EPINEPHrine (EPIPEN) 0 3 mg/0 3 mL SOAJ, Inject 0 3 mL (0 3 mg total) into a muscle once for 1 dose, Disp: 0 6 mL, Rfl: 0    methylPREDNISolone 4 MG tablet therapy pack, Use as directed on package, Disp: 1 each, Rfl: 0    ALLERGIES:  No Known Allergies    Review of systems:   Constitutional: Negative for fatigue, fever or loss of apetite  HENT: Negative  Respiratory: Negative for shortness of breath, dyspnea  Cardiovascular: Negative for chest pain/tightness  Gastrointestinal: Negative for abdominal pain, N/V     Endocrine: Negative for cold/heat intolerance, unexplained weight loss/gain  Genitourinary: Negative for flank pain, dysuria, hematuria  Musculoskeletal:  Positive as in the HPI   Skin: Negative for rash  Neurological:  Positive as in the HPI  Psychiatric/Behavioral: Negative for agitation  _____________________________________________________  PHYSICAL EXAMINATION:    Blood pressure 106/70, pulse 78, temperature (!) 96 °F (35 6 °C), resp  rate 20, height 6' 3" (1 905 m), weight (!) 175 kg (385 lb)  General: well developed and well nourished, alert, oriented times 3 and appears comfortable  Psychiatric: Normal  HEENT: Benign  Cardiovascular: Regular    Pulmonary: No wheezing or stridor  Abdomen: Soft, Nontender  Skin: No masses, erythema, lacerations, fluctation, ulcerations  Neurovascular: Motor and sensory exams are intact  Deep tendon reflexes 1/4  Pulses are palpable    MUSCULOSKELETAL EXAMINATION:    The lumbar exam demonstrates no tenderness over the spinous processes or paraspinal muscles  He was able to forward flex reaching the mid tibia and denied any complaints including exacerbation of his hamstring pain  He had only about 10° of extension but denies any complaints  He has 60° of both right and left rotation complaining of left lateral thigh pain during left rotation  He was able to toe and heel walk although he did note pain in the posterior aspect of his left leg with heel walking  In prone testing, there is no tenderness over the SI joints, sciatic nerve in the buttocks or thighs  He did have some mild tenderness over the thigh musculature just lateral to the midline at approximately the mid aspect of his thigh  Sitting root sign is negative  Straight leg raising is negative  Placing the hip in 73° of flexion elicited complaints of pain in his posterior thigh which was not exacerbated with knee extension  Bilateral hip and knee exam demonstrates good range of motion without complaints    The calf and thigh musculature of the left lower extremity is largely nontender although he does have 1 area as mentioned above with there is some tenderness in his left posterior thigh hamstrings          Arturo Brooks

## 2021-04-23 NOTE — LETTER
April 23, 2021     Patient: Samara Clemente   YOB: 1970   Date of Visit: 4/23/2021       To Whom it May Concern:    Evangelina Munguia is under my professional care  He was seen in my office on 4/23/2021  He may return to work on 04/26/2021       If you have any questions or concerns, please don't hesitate to call           Sincerely,          Kassandra Sena        CC: No Recipients

## 2021-04-26 ENCOUNTER — EVALUATION (OUTPATIENT)
Dept: PHYSICAL THERAPY | Facility: CLINIC | Age: 51
End: 2021-04-26
Payer: COMMERCIAL

## 2021-04-26 DIAGNOSIS — M79.605 LEFT LEG PAIN: ICD-10-CM

## 2021-04-26 PROCEDURE — 97535 SELF CARE MNGMENT TRAINING: CPT | Performed by: PHYSICAL THERAPIST

## 2021-04-26 PROCEDURE — 97162 PT EVAL MOD COMPLEX 30 MIN: CPT | Performed by: PHYSICAL THERAPIST

## 2021-04-26 NOTE — PROGRESS NOTES
PT Evaluation   Today's date: 2021  Patient name: Fabienne Lomeli  : 1970  MRN: [id number]  Referring provider: Kumar Barboza DO  Dx:   Encounter Diagnosis     ICD-10-CM    1  Left leg pain  M79 605 Ambulatory referral to Physical Therapy     Assessment  Assessment details: 2021  Parish Stevens states his left knee region flared up about three weeks ago  He does not remember any accident or injury  His left posterior knee region just flared up  Impairments: abnormal gait, abnormal or restricted ROM, abnormal movement, activity intolerance, impaired balance, impaired physical strength, lacks appropriate home exercise program, pain with function, safety issue and weight-bearing intolerance  Understanding of Dx/Px/POC: excellent  Goals  STG 2-4 weeks:    Increase B LE strength 2-5 lbs  Decrease pain by 1-2 levels on 1-10 pain scale  Increase standing/walking tolerance to >30 minutes  Patient independent with HEP  LTG 6-8 weeks:   Increase B LE strength 10-20 lbs  Decrease pain to 0-2/10 with activity  Increase single leg stance >30 seconds  Increase standing/walking tolerance to >90 minutes  Increase range of motion to normal   Improve gait pattern, coordination and balance to normal   D/C with HEP  Plan  Plan details: All planned modality interventions and planned therapy interventions are provided PRN    Patient would benefit from: PT eval and skilled physical therapy  Planned modality interventions: ultrasound and unattended electrical stimulation  Planned therapy interventions: joint mobilization, manual therapy, neuromuscular re-education, patient education, postural training, self care, balance, balance/weight bearing training, coordination, strengthening, stretching, therapeutic activities, therapeutic exercise, therapeutic training, transfer training, home exercise program, graded exercise, gait training and flexibility  Frequency: 3x week  Duration in weeks: 12  Treatment plan discussed with: patient        Subjective Evaluation    Pain  Quality: discomfort, knife-like, pulling, squeezing, sharp and tight  Relieving factors: support, rest, relaxation and change in position  Aggravating factors: lifting, running, stair climbing, walking and standing  Progression: worsening    Treatments  Current treatment: physical therapy  Patient Goals  Patient goals for therapy: decreased pain, improved balance, increased motion, return to work, return to [employer] activities, independence with ADLs/IADLs and increased strength          Objective    Date of onset:  4/2/2021    Date of Surgery:  None    History of Present Episode: 4/26/2021  Jm Zamorano states his left knee region flared up about three weeks ago  He does not remember any accident or injury  His left posterior knee region just flared up  Past Medical History:    4/26/2021  Ronak reports no issues  Previous Level of Functional Ability:  4/26/2021  Ronak states he was fine unit this recent flare up  This is the firs time his leg flare up  Inspection / Palpation:  Knee:  4/26/2021  Mesomorphic / Endomorphic body type  No signs of infection  No signs of wounds  No signs of drainage  No signs of ecchymotic regions  No signs of erythremic regions  Moderate signs of muscle spasm  Moderate signs of muscle guarding  Moderate signs of tenderness reported to palpation  No signs of atrophy noted  No signs of swelling  No signs of a surgery site  Current conditions appears consistent with recent acute episode  Chief Complaints:  4/26/2021  Ronak reports moderate to severe difficulty with standing  Jm Zamorano reports moderate to severe difficulty with walking  Jm Zamorano reports moderate to severe difficulty with movement / use of his left knee  Jm Zamorano reports moderate to severe difficulty with use of stairs  Jm Zamorano reports severe difficulty with running    Ronak reports severe difficulty with jumping  Kristin Perrin reports moderate to severe difficulty with use of inclines  Kristin Perrin reports mild to moderate difficulty with sleeping  Kristin Perrin reports mild to moderate difficulty with his strength and endurance  Kristin Perrin reports mild limitations with his range of motion  Kristin Perrin reports moderate difficulty lying on his left knee region  KNEE PAIN Resting Moving Palpation Standing Walking   4/26/2021 Rt 0 0 0 0 0   4/26/2021 Lt 0-2 0-2 0-6 0-7 0-7     KNEE PAIN Running Stairs Sleeping Twisting Jumping   4/26/2021 Rt 0 0 0 0 0   4/26/2021 Lt NA 3-7 0-4 3-7 NA     KNEE AROM Flexion Extension SLR   4/26/2021 Rt 140° 0° 85°   4/26/2021 Lt 140° 0° 85°     KNEE MMT / PAIN Flexion Extension Varus Stress Valgus Stress   4/26/2021 Rt 0/10  32 lbs 0/10  33 lbs 0/10  31 lbs 0/10  32 lbs   4/26/2021 Lt 0/10  21 lbs 0/10  26 lbs 0/10  20 lbs 0/10  21 lbs     Knee Screen MCL LCL ACL PCL   4/26/2021 Rt Negative Negative Negative Negative   4/26/2021 Lt Negative Negative Negative Negative     Knee Screen Patellar Quad Stress Meniscal Medial Meniscal Lateral   4/26/2021 Rt Negative Negative Negative Negative   4/26/2021 Lt Negative Negative Negative Negative     Precautions:  Left Hamstring Issues    All treatments below will be provided with a focus on strengthening, flexibility, ROM, postural,   endurance and any possible swelling and pain which may be present without ignoring   neural issues involving balance, coordination and proprioception which is also important   and necessary to provide full functional mobility and quality care        Daily Treatment Log  Manual  4/26       MT, ROM        HEP 15'       Neuro Re-Ed 4/26       Balance Board        Chair Squats        P-Bar-GT-Forward, Backward,Side-Even & Dips        BOSU-Walk        Foam Pad SLR,Hip/KneeFl,Step Ups        Foam Beam        Fitter-Slalom        Monster Steps        BAPS- L-2        Ther Exer 4/26       T/G-Squats PF        W/P-Hip-Abd,Add,Flex,Ext WP-Squats        NuStep        [id number]        NK Table Exer        NK Table ROM        TM        Stepper        Bike        ME, PE                Modalities 4/26       [address] / Verneda Repress / Enaj Maizes

## 2021-04-27 ENCOUNTER — OFFICE VISIT (OUTPATIENT)
Dept: PHYSICAL THERAPY | Facility: CLINIC | Age: 51
End: 2021-04-27
Payer: COMMERCIAL

## 2021-04-27 DIAGNOSIS — M79.605 LEFT LEG PAIN: Primary | ICD-10-CM

## 2021-04-27 PROCEDURE — 97140 MANUAL THERAPY 1/> REGIONS: CPT | Performed by: PHYSICAL THERAPIST

## 2021-04-27 PROCEDURE — 97110 THERAPEUTIC EXERCISES: CPT | Performed by: PHYSICAL THERAPIST

## 2021-04-27 NOTE — PROGRESS NOTES
Daily Note     Today's date: 2021  Patient name: Tiara Rivera  : 1970  MRN: [id number]  Referring provider: Mohit Bonilla DO  Dx:   Encounter Diagnosis     ICD-10-CM    1  Left leg pain  M79 605                   Subjective:  Patient stated his left leg is sore today  Objective: See treatment diary below      Assessment: Tolerated treatment well  Patient exhibited good technique with therapeutic exercises and would benefit from continued PT      Plan: Continue per plan of care  Progress treatment as tolerated  Precautions:  Left Hamstring Issues    All treatments below will be provided with a focus on strengthening, flexibility, ROM, postural,   endurance and any possible swelling and pain which may be present without ignoring   neural issues involving balance, coordination and proprioception which is also important   and necessary to provide full functional mobility and quality care        Daily Treatment Log  Manual        MT, ROM  15'      HEP 15'       Neuro Re-Ed       Balance Board        Chair Squats        P-Bar-GT-Forward, Backward,Side-Even & Dips        BOSU-Walk        Foam Pad SLR,Hip/KneeFl,Step Ups        Foam Beam        Fitter-Slalom        Monster Steps        BAPS- L-2        Ther Exer       T/G-Squats PF        W/P-Hip-Abd,Add,Flex,Ext        WP-Squats        NuStep  15' L1      [id number]        NK Table Exer  5#-30x      NK Table ROM        TM        Stepper        Bike  9' L1      ME, PE  15'              Modalities       MH / PE / ES  20'      US

## 2021-04-29 ENCOUNTER — APPOINTMENT (OUTPATIENT)
Dept: PHYSICAL THERAPY | Facility: CLINIC | Age: 51
End: 2021-04-29
Payer: COMMERCIAL

## 2021-04-29 NOTE — PROGRESS NOTES
Daily Note     Today's date: 2021  Patient name: Reinier Stafford  : 1970  MRN: [id number]  Referring provider: Zunilda Daigle DO  Dx:   Encounter Diagnosis     ICD-10-CM    1  Left leg pain  M79 605                   Subjective: No new c/o pain today  Objective: See treatment diary below      Assessment: Tolerated treatment well  Patient exhibited good technique with therapeutic exercises and would benefit from continued PT to increase L LE ROM/strength and endurance to improve mobility  Plan: Continue per plan of care  Progress treatment as tolerated  Precautions:  Left Hamstring Issues    All treatments below will be provided with a focus on strengthening, flexibility, ROM, postural,   endurance and any possible swelling and pain which may be present without ignoring   neural issues involving balance, coordination and proprioception which is also important   and necessary to provide full functional mobility and quality care        Daily Treatment Log  Manual       MT, ROM  15'      HEP 15'       Neuro Re-Ed      Balance Board        Chair Squats        P-Bar-GT-Forward, Backward,Side-Even & Dips        BOSU-Walk        Foam Pad SLR,Hip/KneeFl,Step Ups        Foam Beam        Fitter-Slalom        Monster Steps        BAPS- L-2        Ther Exer      T/G-Squats PF        W/P-Hip-Abd,Add,Flex,Ext        WP-Squats        NuStep  15' L1      [id number]        NK Table Exer  5#-30x      NK Table ROM        TM        Stepper        Bike  9' L1      ME, PE  13' 15'             Modalities      MH / PE / ES  20'      US

## 2021-05-12 NOTE — PROGRESS NOTES
PT Discharge  Today's date: 2021  Patient name: Joycelyn Dorman  : 1970  MRN: [id number]  Referring provider: Malachi Ayala DO  Dx:   Encounter Diagnosis     ICD-10-CM    1  Left leg pain  M79 605      Assessment/Plan    Subjective    Objective     2021  Dash Lisa has not returned for more treatment  Joycelyn Dorman did not attend today's appointment  I cannot provide you with a current progress report but I can provide you with information based on previous performance  Joycelyn Dorman is discharged at this time

## 2021-06-04 DIAGNOSIS — M10.9 GOUT OF LEFT KNEE, UNSPECIFIED CAUSE, UNSPECIFIED CHRONICITY: ICD-10-CM

## 2021-06-04 DIAGNOSIS — I10 ESSENTIAL HYPERTENSION: ICD-10-CM

## 2021-06-04 DIAGNOSIS — M54.6 THORACIC BACK PAIN, UNSPECIFIED BACK PAIN LATERALITY, UNSPECIFIED CHRONICITY: ICD-10-CM

## 2021-06-04 RX ORDER — CYCLOBENZAPRINE HCL 10 MG
10 TABLET ORAL
Qty: 30 TABLET | Refills: 0 | Status: SHIPPED | OUTPATIENT
Start: 2021-06-04

## 2021-06-04 RX ORDER — AMLODIPINE BESYLATE 5 MG/1
5 TABLET ORAL DAILY
Qty: 30 TABLET | Refills: 0 | Status: SHIPPED | OUTPATIENT
Start: 2021-06-04 | End: 2021-07-09 | Stop reason: SDUPTHER

## 2021-06-04 RX ORDER — ENALAPRIL MALEATE 20 MG/1
20 TABLET ORAL DAILY
Qty: 30 TABLET | Refills: 0 | Status: SHIPPED | OUTPATIENT
Start: 2021-06-04 | End: 2021-07-12

## 2021-06-04 RX ORDER — COLCHICINE 0.6 MG/1
TABLET ORAL
Qty: 30 TABLET | Refills: 0 | Status: SHIPPED | OUTPATIENT
Start: 2021-06-04

## 2021-07-01 ENCOUNTER — OFFICE VISIT (OUTPATIENT)
Dept: URGENT CARE | Facility: CLINIC | Age: 51
End: 2021-07-01
Payer: COMMERCIAL

## 2021-07-01 VITALS
DIASTOLIC BLOOD PRESSURE: 74 MMHG | HEIGHT: 74 IN | RESPIRATION RATE: 18 BRPM | SYSTOLIC BLOOD PRESSURE: 133 MMHG | BODY MASS INDEX: 40.43 KG/M2 | OXYGEN SATURATION: 98 % | TEMPERATURE: 98.6 F | HEART RATE: 74 BPM | WEIGHT: 315 LBS

## 2021-07-01 DIAGNOSIS — J02.0 STREP PHARYNGITIS: Primary | ICD-10-CM

## 2021-07-01 LAB — S PYO AG THROAT QL: POSITIVE

## 2021-07-01 PROCEDURE — 87880 STREP A ASSAY W/OPTIC: CPT | Performed by: PHYSICIAN ASSISTANT

## 2021-07-01 PROCEDURE — 99203 OFFICE O/P NEW LOW 30 MIN: CPT | Performed by: PHYSICIAN ASSISTANT

## 2021-07-01 RX ORDER — AMOXICILLIN 500 MG/1
500 CAPSULE ORAL EVERY 12 HOURS SCHEDULED
Qty: 14 CAPSULE | Refills: 0 | Status: SHIPPED | OUTPATIENT
Start: 2021-07-01 | End: 2021-07-01

## 2021-07-01 RX ORDER — PREDNISONE 10 MG/1
10 TABLET ORAL DAILY
Qty: 21 TABLET | Refills: 0 | Status: SHIPPED | OUTPATIENT
Start: 2021-07-01 | End: 2022-01-16

## 2021-07-01 RX ORDER — PREDNISONE 10 MG/1
10 TABLET ORAL DAILY
Qty: 21 TABLET | Refills: 0 | Status: SHIPPED | OUTPATIENT
Start: 2021-07-01 | End: 2021-07-01

## 2021-07-01 RX ORDER — AMOXICILLIN 500 MG/1
500 CAPSULE ORAL EVERY 12 HOURS SCHEDULED
Qty: 14 CAPSULE | Refills: 0 | Status: SHIPPED | OUTPATIENT
Start: 2021-07-01 | End: 2021-07-08

## 2021-07-01 NOTE — LETTER
July 1, 2021     Patient: Blanca Lisa   YOB: 1970   Date of Visit: 7/1/2021       To Whom It May Concern: It is my medical opinion that Wilton Ervin should remain out of work until 7/3/2021  If you have any questions or concerns, please don't hesitate to call           Sincerely,        Demarco Garner PA-C

## 2021-07-01 NOTE — PATIENT INSTRUCTIONS
Take antibiotic as prescribed  Complete full dose of antibiotics even if symptoms begin to improve or resolve  This is very contagious; do not share drinks or food with others  Replace your toothbrush in 1-2 days to prevent reinfection  Use OTC Tylenol for fever  Your symptoms should begin to improve over the next couple days  Follow-up with your PCP in 3-5 days if symptoms worsen or do not improve  Go to ER if symptoms become severe  Strep Throat   WHAT YOU NEED TO KNOW:   Strep throat is a throat infection caused by bacteria  It is easily spread from person to person  DISCHARGE INSTRUCTIONS:   Call 911 for any of the following:   · You have trouble breathing  Return to the emergency department if:   · You have new symptoms like a bad headache, stiff neck, chest pain, or vomiting  · You are drooling because you cannot swallow your spit  Contact your healthcare provider if:   · You have a fever  · You have a rash or ear pain  · You have green, yellow-brown, or bloody mucus when you cough or blow your nose  · You are unable to drink anything  · You have questions or concerns about your condition or care  Medicines:   · Antibiotics  help treat your strep throat  You should feel better within 2 to 3 days after you start antibiotics  · Take your medicine as directed  Contact your healthcare provider if you think your medicine is not helping or if you have side effects  Tell him or her if you are allergic to any medicine  Keep a list of the medicines, vitamins, and herbs you take  Include the amounts, and when and why you take them  Bring the list or the pill bottles to follow-up visits  Carry your medicine list with you in case of an emergency  Manage your symptoms:   · Use lozenges, ice, soft foods, or popsicles  to soothe your throat  · Drink juice, milk shakes, or soup  if your throat is too sore to eat solid food   Drinking liquids can also help prevent dehydration  · Gargle with salt water  Mix ¼ teaspoon salt in a glass of warm water and gargle  This may help reduce swelling in your throat  · Do not smoke  Nicotine and other chemicals in cigarettes and cigars can cause lung damage and make your symptoms worse  Ask your healthcare provider for information if you currently smoke and need help to quit  E-cigarettes or smokeless tobacco still contain nicotine  Talk to your healthcare provider before you use these products  Return to work or school  24 hours after you start antibiotic medicine  Prevent the spread of strep throat:   · Wash your hands often  Use soap and water  Wash your hands after you use the bathroom, change a child's diapers, or sneeze  Wash your hands before you prepare or eat food  · Do not share food or drinks  Replace your toothbrush after you have taken antibiotics for 24 hours  Follow up with your healthcare provider as directed:  Write down your questions so you remember to ask them during your visits  © Copyright [address] Information is for End User's use only and may not be sold, redistributed or otherwise used for commercial purposes  All illustrations and images included in CareNotes® are the copyrighted property of [employer]  or [address] [place] [address]  The above information is an  only  It is not intended as medical advice for individual conditions or treatments  Talk to your doctor, nurse or pharmacist before following any medical regimen to see if it is safe and effective for you

## 2021-07-01 NOTE — PROGRESS NOTES
[facility]        NAME: Brittany Brooke is a 48 y o  male  : 1970    MRN: [id number]  DATE: 2021  TIME: 5:33 PM    Assessment and Plan   Strep pharyngitis [J02 0]  1  Strep pharyngitis  POCT rapid strepA    predniSONE 10 mg tablet    al mag oxide-diphenhydramine-lidocaine viscous (MAGIC MOUTHWASH) 1:1:1 suspension    amoxicillin (AMOXIL) 500 mg capsule    DISCONTINUED: amoxicillin (AMOXIL) 500 mg capsule    DISCONTINUED: predniSONE 10 mg tablet    DISCONTINUED: al mag oxide-diphenhydramine-lidocaine viscous (MAGIC MOUTHWASH) 1:1:1 suspension         Patient Instructions   Take antibiotic as prescribed  Complete full dose of antibiotics even if symptoms begin to improve or resolve  This is very contagious; do not share drinks or food with others  Replace your toothbrush in 1-2 days to prevent reinfection  Use OTC Tylenol for fever  Your symptoms should begin to improve over the next couple days  Follow up with PCP in 3-5 days  Proceed to  ER if symptoms worsen  Chief Complaint     Chief Complaint   Patient presents with    Sore Throat     Started 4 days ago along with headache          History of Present Illness       Patient is a 49-year-old male with significant past medical history of hypertension and tonsilectomy presents the office complaining of mild headache and sore throat for 4 days  Patient also reports painful dysphagia but denies fever, chills, cough, SOB, CP, difficulty breathing, anosmia, dysgeusia, or weakness  History reports his daughter was recently sick with a cold  Review of Systems   Review of Systems   Constitutional: Negative for chills and fever  HENT: Positive for sore throat and trouble swallowing  Negative for congestion  Respiratory: Negative for cough and shortness of breath  Cardiovascular: Negative for chest pain and palpitations  Gastrointestinal: Negative for abdominal pain, diarrhea, nausea and vomiting     Musculoskeletal: Negative for myalgias  Neurological: Positive for headaches  Negative for dizziness and light-headedness  Current Medications       Current Outpatient Medications:     al mag oxide-diphenhydramine-lidocaine viscous (MAGIC MOUTHWASH) 1:1:1 suspension, Swish and spit 10 mL every 4 (four) hours as needed for mouth pain or discomfort, Disp: 90 mL, Rfl: 0    amLODIPine (NORVASC) 5 mg tablet, Take 1 tablet (5 mg total) by mouth daily, Disp: 30 tablet, Rfl: 0    amoxicillin (AMOXIL) 500 mg capsule, Take 1 capsule (500 mg total) by mouth every 12 (twelve) hours for 7 days, Disp: 14 capsule, Rfl: 0    colchicine (COLCRYS) 0 6 mg tablet, TAKE (1) TABLET DAILY  , Disp: 30 tablet, Rfl: 0    cyclobenzaprine (FLEXERIL) 10 mg tablet, Take 1 tablet (10 mg total) by mouth daily at bedtime, Disp: 30 tablet, Rfl: 0    diphenhydrAMINE (BENADRYL) 25 mg tablet, Take 2 tablets (50 mg total) by mouth every 8 (eight) hours as needed for itching, Disp: 20 tablet, Rfl: 0    enalapril (VASOTEC) 20 mg tablet, Take 1 tablet (20 mg total) by mouth daily, Disp: 30 tablet, Rfl: 0    EPINEPHrine (EPIPEN) 0 3 mg/0 3 mL SOAJ, Inject 0 3 mL (0 3 mg total) into a muscle once for 1 dose, Disp: 0 6 mL, Rfl: 0    predniSONE 10 mg tablet, Take 1 tablet (10 mg total) by mouth daily Take 6 on day 1, take 5 on day 2, take 4 on day 3, take 3 on day 4, take 2 on day 5, take 1 on day 6 , Disp: 21 tablet, Rfl: 0    Current Allergies     Allergies as of 07/01/2021    (No Known Allergies)            The following portions of the patient's history were reviewed and updated as appropriate: allergies, current medications, past family history, past medical history, past social history, past surgical history and problem list      Past Medical History:   Diagnosis Date    Arthritis     Cellulitis     Gout     Hypertension        Past Surgical History:   Procedure Laterality Date    TONSILLECTOMY         Family History   Problem Relation Age of Onset    No Known Problems Mother     No Known Problems Father          Medications have been verified  Objective   /74   Pulse 74   Temp 98 6 °F (37 °C)   Resp 18   Ht 6' 2" (1 88 m)   Wt (!) 159 kg (350 lb)   SpO2 98%   BMI 44 94 kg/m²   No LMP for male patient  Physical Exam     Physical Exam  Vitals and nursing note reviewed  Constitutional:       Appearance: Normal appearance  He is well-developed  HENT:      Head: Normocephalic and atraumatic  Right Ear: Tympanic membrane, ear canal and external ear normal       Left Ear: Tympanic membrane, ear canal and external ear normal       Nose: Nose normal       Mouth/Throat:      Mouth: Mucous membranes are moist       Pharynx: Uvula midline  Pharyngeal swelling, oropharyngeal exudate and posterior oropharyngeal erythema present  Tonsils: No tonsillar abscesses  Eyes:      General: Lids are normal       Conjunctiva/sclera: Conjunctivae normal       Pupils: Pupils are equal, round, and reactive to light  Cardiovascular:      Rate and Rhythm: Normal rate and regular rhythm  Heart sounds: Normal heart sounds  No murmur heard  No friction rub  No gallop  Pulmonary:      Effort: Pulmonary effort is normal       Breath sounds: Normal breath sounds  No stridor  No wheezing, rhonchi or rales  Musculoskeletal:         General: Normal range of motion  Cervical back: Neck supple  Lymphadenopathy:      Cervical: Cervical adenopathy present  Skin:     General: Skin is warm and dry  Capillary Refill: Capillary refill takes less than 2 seconds  Neurological:      Mental Status: He is alert         Rapid strep POSITIVE

## 2021-07-03 ENCOUNTER — HOSPITAL ENCOUNTER (EMERGENCY)
Facility: HOSPITAL | Age: 51
Discharge: HOME | End: 2021-07-03
Attending: EMERGENCY MEDICINE
Payer: COMMERCIAL

## 2021-07-03 ENCOUNTER — APPOINTMENT (EMERGENCY)
Dept: CT IMAGING | Facility: HOSPITAL | Age: 51
End: 2021-07-03
Payer: COMMERCIAL

## 2021-07-03 VITALS
DIASTOLIC BLOOD PRESSURE: 70 MMHG | HEIGHT: 74 IN | BODY MASS INDEX: 40.43 KG/M2 | OXYGEN SATURATION: 96 % | TEMPERATURE: 97.4 F | RESPIRATION RATE: 18 BRPM | HEART RATE: 73 BPM | SYSTOLIC BLOOD PRESSURE: 130 MMHG | WEIGHT: 315 LBS

## 2021-07-03 DIAGNOSIS — J02.0 STREP PHARYNGITIS: ICD-10-CM

## 2021-07-03 DIAGNOSIS — J02.9 SORE THROAT: ICD-10-CM

## 2021-07-03 DIAGNOSIS — B37.0 OROPHARYNGEAL CANDIDIASIS: Primary | ICD-10-CM

## 2021-07-03 DIAGNOSIS — R59.0 CERVICAL LYMPHADENOPATHY: ICD-10-CM

## 2021-07-03 LAB
ALBUMIN SERPL BCP-MCNC: 3.4 G/DL (ref 3.5–5)
ALP SERPL-CCNC: 79 U/L (ref 46–116)
ALT SERPL W P-5'-P-CCNC: 44 U/L (ref 12–78)
ANION GAP SERPL CALCULATED.3IONS-SCNC: 7 MMOL/L (ref 4–13)
AST SERPL W P-5'-P-CCNC: 30 U/L (ref 5–45)
BASOPHILS # BLD AUTO: 0.04 THOUSANDS/ΜL (ref 0–0.1)
BASOPHILS NFR BLD AUTO: 0 % (ref 0–1)
BILIRUB SERPL-MCNC: 0.53 MG/DL (ref 0.2–1)
BUN SERPL-MCNC: 15 MG/DL (ref 5–25)
CALCIUM ALBUM COR SERPL-MCNC: 9.2 MG/DL (ref 8.3–10.1)
CALCIUM SERPL-MCNC: 8.7 MG/DL (ref 8.3–10.1)
CHLORIDE SERPL-SCNC: 101 MMOL/L (ref 100–108)
CO2 SERPL-SCNC: 29 MMOL/L (ref 21–32)
CREAT SERPL-MCNC: 0.87 MG/DL (ref 0.6–1.3)
EOSINOPHIL # BLD AUTO: 0.14 THOUSAND/ΜL (ref 0–0.61)
EOSINOPHIL NFR BLD AUTO: 2 % (ref 0–6)
ERYTHROCYTE [DISTWIDTH] IN BLOOD BY AUTOMATED COUNT: 13.8 % (ref 11.6–15.1)
GFR SERPL CREATININE-BSD FRML MDRD: 101 ML/MIN/1.73SQ M
GLUCOSE SERPL-MCNC: 111 MG/DL (ref 65–140)
HCT VFR BLD AUTO: 44.6 % (ref 36.5–49.3)
HGB BLD-MCNC: 15.1 G/DL (ref 12–17)
IMM GRANULOCYTES # BLD AUTO: 0.02 THOUSAND/UL (ref 0–0.2)
IMM GRANULOCYTES NFR BLD AUTO: 0 % (ref 0–2)
LACTATE SERPL-SCNC: 1 MMOL/L (ref 0.5–2)
LYMPHOCYTES # BLD AUTO: 1.73 THOUSANDS/ΜL (ref 0.6–4.47)
LYMPHOCYTES NFR BLD AUTO: 18 % (ref 14–44)
MCH RBC QN AUTO: 30.8 PG (ref 26.8–34.3)
MCHC RBC AUTO-ENTMCNC: 33.9 G/DL (ref 31.4–37.4)
MCV RBC AUTO: 91 FL (ref 82–98)
MONOCYTES # BLD AUTO: 0.73 THOUSAND/ΜL (ref 0.17–1.22)
MONOCYTES NFR BLD AUTO: 8 % (ref 4–12)
NEUTROPHILS # BLD AUTO: 6.83 THOUSANDS/ΜL (ref 1.85–7.62)
NEUTS SEG NFR BLD AUTO: 72 % (ref 43–75)
NRBC BLD AUTO-RTO: 0 /100 WBCS
PLATELET # BLD AUTO: 245 THOUSANDS/UL (ref 149–390)
PMV BLD AUTO: 11.1 FL (ref 8.9–12.7)
POTASSIUM SERPL-SCNC: 3.9 MMOL/L (ref 3.5–5.3)
PROT SERPL-MCNC: 8.3 G/DL (ref 6.4–8.2)
RBC # BLD AUTO: 4.91 MILLION/UL (ref 3.88–5.62)
SODIUM SERPL-SCNC: 137 MMOL/L (ref 136–145)
WBC # BLD AUTO: 9.49 THOUSAND/UL (ref 4.31–10.16)

## 2021-07-03 PROCEDURE — 99284 EMERGENCY DEPT VISIT MOD MDM: CPT | Performed by: EMERGENCY MEDICINE

## 2021-07-03 PROCEDURE — 80053 COMPREHEN METABOLIC PANEL: CPT | Performed by: EMERGENCY MEDICINE

## 2021-07-03 PROCEDURE — 36415 COLL VENOUS BLD VENIPUNCTURE: CPT | Performed by: EMERGENCY MEDICINE

## 2021-07-03 PROCEDURE — G1004 CDSM NDSC: HCPCS

## 2021-07-03 PROCEDURE — 85025 COMPLETE CBC W/AUTO DIFF WBC: CPT | Performed by: EMERGENCY MEDICINE

## 2021-07-03 PROCEDURE — 96375 TX/PRO/DX INJ NEW DRUG ADDON: CPT

## 2021-07-03 PROCEDURE — 86308 HETEROPHILE ANTIBODY SCREEN: CPT | Performed by: EMERGENCY MEDICINE

## 2021-07-03 PROCEDURE — 83605 ASSAY OF LACTIC ACID: CPT | Performed by: EMERGENCY MEDICINE

## 2021-07-03 PROCEDURE — 96374 THER/PROPH/DIAG INJ IV PUSH: CPT

## 2021-07-03 PROCEDURE — 96361 HYDRATE IV INFUSION ADD-ON: CPT

## 2021-07-03 PROCEDURE — 70491 CT SOFT TISSUE NECK W/DYE: CPT

## 2021-07-03 PROCEDURE — 99284 EMERGENCY DEPT VISIT MOD MDM: CPT

## 2021-07-03 RX ORDER — KETOROLAC TROMETHAMINE 30 MG/ML
15 INJECTION, SOLUTION INTRAMUSCULAR; INTRAVENOUS ONCE
Status: COMPLETED | OUTPATIENT
Start: 2021-07-03 | End: 2021-07-03

## 2021-07-03 RX ORDER — DEXAMETHASONE SODIUM PHOSPHATE 10 MG/ML
10 INJECTION, SOLUTION INTRAMUSCULAR; INTRAVENOUS ONCE
Status: COMPLETED | OUTPATIENT
Start: 2021-07-03 | End: 2021-07-03

## 2021-07-03 RX ADMIN — SODIUM CHLORIDE 1000 ML: 0.9 INJECTION, SOLUTION INTRAVENOUS at 07:45

## 2021-07-03 RX ADMIN — IOHEXOL 85 ML: 350 INJECTION, SOLUTION INTRAVENOUS at 08:41

## 2021-07-03 RX ADMIN — DEXAMETHASONE SODIUM PHOSPHATE 10 MG: 10 INJECTION, SOLUTION INTRAMUSCULAR; INTRAVENOUS at 07:46

## 2021-07-03 RX ADMIN — KETOROLAC TROMETHAMINE 15 MG: 30 INJECTION, SOLUTION INTRAMUSCULAR at 07:45

## 2021-07-03 RX ADMIN — NYSTATIN 500000 UNITS: 100000 SUSPENSION ORAL at 07:56

## 2021-07-03 NOTE — ED PROVIDER NOTES
History  Chief Complaint   Patient presents with    Sore Throat     pt reports he has had sore throat and pain with swallowing x 1 week  pt seen at urgent care and started on antibiotics on thursday but feels he is not improving  59-year-old male with a past medical history of gout, cellulitis, arthritis, hypertension status post tonsillectomy presents with 6 days of progressively worsening sore throat and painful dysphagia with intermittent headache  Patient was seen at urgent care on July 1st and had a positive strep pharyngitis test was prescribed amoxicillin, prednisone and Magic mouthwash  He has been taking medications as prescribed but states that it hurts "a lot "when he swallows  Patient states he has had decreased fluid intake due to his sore throat but states that he is able to swallow  Denies history of retropharyngeal abscess, Bryson's angina, Frazier's esophagitis or strep pharyngitis  Patient states his daughter had been sick with a cold prior to his symptom onset  Patient denies fever, chills, dizziness, diaphoresis, loss of taste or smell, trismus, drooling, neck stiffness, cough, sputum production, nausea, vomiting, chest pain, shortness of breath, back pain, rash, abdominal pain, urinary symptoms, penile discharge, scrotal swelling, focal motor or sensory deficits, lower extremity swelling or pain, diarrhea, bloody stools or constipation  Patient is concerned that he may have a throat abscess due to his strep throat because of the worsening pain  He is requesting a CT scan  Review of medical chart shows no recent hospitalizations  Patient has not had a COVID-19 test  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 due to COVID-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 including face mask and gloves            History provided by:  Patient and medical records   used: No    Sore Throat  Location:  Generalized  Severity:  Severe  Onset quality:  Gradual  Duration:  6 days  Timing: Constant  Progression:  Worsening  Chronicity:  New  Relieved by:  Nothing  Worsened by:  Drinking, eating and swallowing  Ineffective treatments:  OTC medications  Associated symptoms: adenopathy, headaches and trouble swallowing    Associated symptoms: no abdominal pain, no chest pain, no chills, no cough, no drooling, no ear discharge, no ear pain, no epistaxis, no eye discharge, no fever, no neck stiffness, no night sweats, no plugged ear sensation, no postnasal drip, no rash, no rhinorrhea, no shortness of breath, no sinus congestion, no stridor and no voice change    Headaches:     Severity:  Mild    Onset quality:  Unable to specify    Duration:  6 days    Timing:  Sporadic    Progression:  Unchanged    Chronicity:  New  Risk factors: exposure to strep and sick contacts    Risk factors: no exposure to mono, no recent dental procedure, no recent endoscopy and no recent ENT procedure        Prior to Admission Medications   Prescriptions Last Dose Informant Patient Reported? Taking? al mag oxide-diphenhydramine-lidocaine viscous (MAGIC MOUTHWASH) 1:1:1 suspension   No Yes   Sig: Swish and spit 10 mL every 4 (four) hours as needed for mouth pain or discomfort   amLODIPine (NORVASC) 5 mg tablet   No Yes   Sig: Take 1 tablet (5 mg total) by mouth daily   amoxicillin (AMOXIL) 500 mg capsule   No Yes   Sig: Take 1 capsule (500 mg total) by mouth every 12 (twelve) hours for 7 days   colchicine (COLCRYS) 0 6 mg tablet   No Yes   Sig: TAKE (1) TABLET DAILY     cyclobenzaprine (FLEXERIL) 10 mg tablet   No Yes   Sig: Take 1 tablet (10 mg total) by mouth daily at bedtime   diphenhydrAMINE (BENADRYL) 25 mg tablet  Self No Yes   Sig: Take 2 tablets (50 mg total) by mouth every 8 (eight) hours as needed for itching   enalapril (VASOTEC) 20 mg tablet   No Yes   Sig: Take 1 tablet (20 mg total) by mouth daily   predniSONE 10 mg tablet   No Yes   Sig: Take 1 tablet (10 mg total) by mouth daily Take 6 on day 1, take 5 on day 2, take 4 on day 3, take 3 on day 4, take 2 on day 5, take 1 on day 6  Facility-Administered Medications: None       Past Medical History:   Diagnosis Date    Arthritis     Cellulitis     Gout     Hypertension        Past Surgical History:   Procedure Laterality Date    TONSILLECTOMY         Family History   Problem Relation Age of Onset    No Known Problems Mother     No Known Problems Father      I have reviewed and agree with the history as documented  E-Cigarette/Vaping    E-Cigarette Use Never User      E-Cigarette/Vaping Substances     Social History     Tobacco Use    Smoking status: Former Smoker    Smokeless tobacco: Never Used    Tobacco comment: Never a smoker as per [employer]   Vaping Use    Vaping Use: Never used   Substance Use Topics    Alcohol use: Yes     Comment: rarely; No alcohol use as per [employer]    Drug use: No       Review of Systems   Constitutional: Negative for chills, diaphoresis, fatigue, fever and night sweats  HENT: Positive for sore throat and trouble swallowing  Negative for congestion, dental problem, drooling, ear discharge, ear pain, facial swelling, hearing loss, mouth sores, nosebleeds, postnasal drip, rhinorrhea, sinus pressure, sinus pain, sneezing, tinnitus and voice change  Eyes: Negative for photophobia, pain, discharge and visual disturbance  Respiratory: Negative for cough, chest tightness, shortness of breath, wheezing and stridor  Cardiovascular: Negative for chest pain, palpitations and leg swelling  Gastrointestinal: Negative for abdominal pain, constipation, diarrhea, nausea and vomiting  Genitourinary: Negative for decreased urine volume, difficulty urinating, discharge, dysuria, flank pain, frequency, hematuria, penile pain, penile swelling, scrotal swelling, testicular pain and urgency  Musculoskeletal: Negative for arthralgias, back pain, myalgias, neck pain and neck stiffness     Skin: Negative for color change, pallor, rash and wound  Allergic/Immunologic: Negative for immunocompromised state  Neurological: Positive for headaches  Negative for dizziness, tremors, seizures, syncope, facial asymmetry, speech difficulty, weakness, light-headedness and numbness  Hematological: Positive for adenopathy  Psychiatric/Behavioral: Negative for agitation, confusion, hallucinations and suicidal ideas  The patient is not nervous/anxious  Physical Exam  Physical Exam  Vitals and nursing note reviewed  Exam conducted with a chaperone present  Constitutional:       General: He is not in acute distress  Appearance: Normal appearance  He is well-developed  He is obese  He is not ill-appearing, toxic-appearing or diaphoretic  Comments: Well appearing, in no acute distress   HENT:      Head: Normocephalic and atraumatic  Jaw: There is normal jaw occlusion  No trismus, tenderness, swelling, pain on movement or malocclusion  Salivary Glands: Right salivary gland is not diffusely enlarged or tender  Left salivary gland is not diffusely enlarged or tender  Right Ear: Hearing, tympanic membrane, ear canal and external ear normal  There is no impacted cerumen  No mastoid tenderness  No hemotympanum  Left Ear: Hearing, ear canal and external ear normal  There is no impacted cerumen  No mastoid tenderness  No hemotympanum  Tympanic membrane is injected and erythematous  Nose: Nose normal  No nasal tenderness, mucosal edema, congestion or rhinorrhea  Right Nostril: No epistaxis  Left Nostril: No epistaxis  Right Sinus: No maxillary sinus tenderness or frontal sinus tenderness  Left Sinus: No maxillary sinus tenderness or frontal sinus tenderness  Mouth/Throat:      Lips: Pink  No lesions  Mouth: Mucous membranes are moist  No injury, lacerations, oral lesions or angioedema  Dentition: Normal dentition  Does not have dentures   No dental tenderness, gingival swelling, dental caries, dental abscesses or gum lesions  Tongue: No lesions  Tongue does not deviate from midline  Palate: No mass and lesions  Pharynx: Uvula midline  Oropharyngeal exudate and posterior oropharyngeal erythema present  No pharyngeal swelling or uvula swelling  Tonsils: No tonsillar exudate or tonsillar abscesses  Comments: No tongue or oral pharyngeal swelling; uvula is midline and not swollen; oropharynx is erythematous with small patches of white accident; tongue is covered with white film which is not easily scraped off with tongue depressor; no sign of peritonsillar or dental abscess  Eyes:      General: Lids are normal  Vision grossly intact  Gaze aligned appropriately  No visual field deficit or scleral icterus  Right eye: No discharge  Left eye: No discharge  Extraocular Movements: Extraocular movements intact  Right eye: No nystagmus  Left eye: No nystagmus  Conjunctiva/sclera: Conjunctivae normal       Right eye: Right conjunctiva is not injected  Left eye: Left conjunctiva is not injected  Pupils: Pupils are equal, round, and reactive to light  Neck:      Thyroid: No thyroid mass or thyromegaly  Vascular: No carotid bruit  Trachea: Trachea and phonation normal    Cardiovascular:      Rate and Rhythm: Normal rate and regular rhythm  Pulses: Normal pulses  Radial pulses are 2+ on the right side and 2+ on the left side  Dorsalis pedis pulses are 2+ on the right side and 2+ on the left side  Heart sounds: Normal heart sounds, S1 normal and S2 normal    Pulmonary:      Effort: Pulmonary effort is normal  No tachypnea, accessory muscle usage, respiratory distress or retractions  Breath sounds: Normal breath sounds and air entry  No stridor or decreased air movement  No decreased breath sounds, wheezing, rhonchi or rales  Chest:      Chest wall: No tenderness     Abdominal:      General: Abdomen is flat  Bowel sounds are normal  There is no distension  Palpations: Abdomen is soft  Abdomen is not rigid  There is no mass  Tenderness: There is no abdominal tenderness  There is no right CVA tenderness, left CVA tenderness, guarding or rebound  Negative signs include Kang's sign and McBurney's sign  Hernia: No hernia is present  Musculoskeletal:         General: No swelling, deformity or signs of injury  Normal range of motion  Cervical back: Full passive range of motion without pain, normal range of motion and neck supple  Tenderness present  No edema, erythema, signs of trauma, rigidity, torticollis or crepitus  No pain with movement, spinous process tenderness or muscular tenderness  Normal range of motion  Right lower leg: No edema  Left lower leg: No edema  Lymphadenopathy:      Cervical: Cervical adenopathy present  Right cervical: Superficial cervical adenopathy and deep cervical adenopathy present  Left cervical: Superficial cervical adenopathy and deep cervical adenopathy present  Skin:     General: Skin is warm and dry  Capillary Refill: Capillary refill takes less than 2 seconds  Coloration: Skin is not ashen, cyanotic, jaundiced, mottled, pale or sallow  Findings: No abrasion, abscess, acne, bruising, burn, ecchymosis, erythema, signs of injury, laceration, lesion, petechiae, rash or wound  Rash is not macular or papular  Neurological:      General: No focal deficit present  Mental Status: He is alert and oriented to person, place, and time  Mental status is at baseline  He is not disoriented  GCS: GCS eye subscore is 4  GCS verbal subscore is 5  GCS motor subscore is 6  Cranial Nerves: Cranial nerves are intact  No cranial nerve deficit, dysarthria or facial asymmetry  Sensory: Sensation is intact  No sensory deficit  Motor: Motor function is intact   No weakness, tremor, atrophy, abnormal muscle tone or seizure activity  Coordination: Coordination is intact  Coordination normal       Gait: Gait is intact  Gait normal       Comments: Patient is AAOx4, GCS 15; speaking clearly and appropriately; motor and sensation intact; visual fields intact; cranial nerves II-XII grossly intact; no facial droop, slurred speech or arm drift   Psychiatric:         Attention and Perception: Attention and perception normal  He is attentive  Mood and Affect: Mood and affect normal          Speech: Speech normal          Behavior: Behavior normal  Behavior is cooperative  Thought Content:  Thought content normal          Cognition and Memory: Cognition and memory normal          Judgment: Judgment normal          Vital Signs  ED Triage Vitals   Temperature Pulse Respirations Blood Pressure SpO2   07/03/21 0723 07/03/21 0723 07/03/21 0723 07/03/21 0723 07/03/21 0723   (!) 97 4 °F (36 3 °C) 80 18 138/75 95 %      Temp src Heart Rate Source Patient Position - Orthostatic VS BP Location FiO2 (%)   -- -- -- -- --             Pain Score       07/03/21 0745       8           Vitals:    07/03/21 0800 07/03/21 0815 07/03/21 0845 07/03/21 0900   BP: 140/86  137/74 126/70   Pulse: 84 79 72 73         Visual Acuity      ED Medications  Medications   phenol (CHLORASEPTIC) 1 4 % mucosal liquid 1 spray (has no administration in time range)   dexamethasone (PF) (DECADRON) injection 10 mg (10 mg Intravenous Given 7/3/21 0746)   ketorolac (TORADOL) injection 15 mg (15 mg Intravenous Given 7/3/21 0745)   sodium chloride 0 9 % bolus 1,000 mL (0 mL Intravenous Stopped 7/3/21 0908)   nystatin (MYCOSTATIN) oral suspension 500,000 Units (500,000 Units Swish & Swallow Given 7/3/21 0756)   iohexol (OMNIPAQUE) 350 MG/ML injection (SINGLE-DOSE) 100 mL (85 mL Intravenous Given 7/3/21 0841)       Diagnostic Studies  Results Reviewed     Procedure Component Value Units Date/Time    Lactic acid, plasma [[id number]]  (Normal) Collected: 07/03/21 0743    Lab Status: Final result Specimen: Blood from Arm, Right Updated: 07/03/21 0818     LACTIC ACID 1 0 mmol/L     Narrative:      Result may be elevated if tourniquet was used during collection      Comprehensive metabolic panel [[id number]]  (Abnormal) Collected: 07/03/21 0743    Lab Status: Final result Specimen: Blood from Arm, Right Updated: 07/03/21 0817     Sodium 137 mmol/L      Potassium 3 9 mmol/L      Chloride 101 mmol/L      CO2 29 mmol/L      ANION GAP 7 mmol/L      BUN 15 mg/dL      Creatinine 0 87 mg/dL      Glucose 111 mg/dL      Calcium 8 7 mg/dL      Corrected Calcium 9 2 mg/dL      AST 30 U/L      ALT 44 U/L      Alkaline Phosphatase 79 U/L      Total Protein 8 3 g/dL      Albumin 3 4 g/dL      Total Bilirubin 0 53 mg/dL      eGFR 101 ml/min/1 73sq m     Narrative:      National Kidney Disease Foundation guidelines for Chronic Kidney Disease (CKD):     Stage 1 with normal or high GFR (GFR > 90 mL/min/1 73 square meters)    Stage 2 Mild CKD (GFR = 60-89 mL/min/1 73 square meters)    Stage 3A Moderate CKD (GFR = 45-59 mL/min/1 73 square meters)    Stage 3B Moderate CKD (GFR = 30-44 mL/min/1 73 square meters)    Stage 4 Severe CKD (GFR = 15-29 mL/min/1 73 square meters)    Stage 5 End Stage CKD (GFR <15 mL/min/1 73 square meters)  Note: GFR calculation is accurate only with a steady state creatinine    CBC and differential [[id number]] Collected: 07/03/21 0743    Lab Status: Final result Specimen: Blood from Arm, Right Updated: 07/03/21 0758     WBC 9 49 Thousand/uL      RBC 4 91 Million/uL      Hemoglobin 15 1 g/dL      Hematocrit 44 6 %      MCV 91 fL      MCH 30 8 pg      MCHC 33 9 g/dL      RDW 13 8 %      MPV 11 1 fL      Platelets 505 Thousands/uL      nRBC 0 /100 WBCs      Neutrophils Relative 72 %      Immat GRANS % 0 %      Lymphocytes Relative 18 %      Monocytes Relative 8 %      Eosinophils Relative 2 %      Basophils Relative 0 %      Neutrophils Absolute 6 83 Thousands/µL      Immature Grans Absolute 0 02 Thousand/uL      Lymphocytes Absolute 1 73 Thousands/µL      Monocytes Absolute 0 73 Thousand/µL      Eosinophils Absolute 0 14 Thousand/µL      Basophils Absolute 0 04 Thousands/µL     Mononucleosis screen [[id number]] Collected: 07/03/21 0743    Lab Status: In process Specimen: Blood from Arm, Right Updated: 07/03/21 0755                 CT soft tissue neck with contrast   Final Result by Sandra Abraham MD (07/03 0900)      Scattered bilateral mildly prominent albeit nonenlarged lymph nodes, left greater than right likely reactive adenitis  Otherwise, no evidence of acute process in the neck  Workstation performed: [id number]                    Procedures  Procedures         ED Course  ED Course as of Jul 03 0922   Sat Jul 03, 2021   0906 CT soft tissue:IMPRESSION:     Scattered bilateral mildly prominent albeit nonenlarged lymph nodes, left greater than right likely reactive adenitis      Otherwise, no evidence of acute process in the neck          0910 Reassessed patient  Reviewed labs and imaging  Patient is tolerating oral intake and swallowing without difficulty  Plan for discharge with primary care provider follow-up and ENT referral as needed for his strep pharyngitis and oral pharyngeal candidiasis  Will give prescription for Nystatin solution and Chloraseptic spray  Patient is already on steroids and antibiotics  Work note provided  Strict return to ER precautions discussed with and acknowledged by patient                    MDM  Number of Diagnoses or Management Options     Amount and/or Complexity of Data Reviewed  Clinical lab tests: reviewed  Tests in the radiology section of CPT®: reviewed and ordered  Tests in the medicine section of CPT®: reviewed  Review and summarize past medical records: yes  Independent visualization of images, tracings, or specimens: yes (CT soft tissue)    Risk of Complications, Morbidity, and/or Mortality  Presenting problems: moderate  Diagnostic procedures: moderate  Management options: moderate    Patient Progress  Patient progress: stable      Disposition  Final diagnoses:   Oropharyngeal candidiasis   Sore throat   Strep pharyngitis   Cervical lymphadenopathy     Time reflects when diagnosis was documented in both MDM as applicable and the Disposition within this note     Time User Action Codes Description Comment    7/3/2021  7:37 AM Yajaira Head Add [B37 0] Oropharyngeal candidiasis     7/3/2021  7:37 AM Yajaira Head Add [J02 9] Sore throat     7/3/2021  7:37 AM Yajaira Head Add [J02 0] Strep pharyngitis     7/3/2021  9:06 AM Yajaira Head Add [R59 0] Cervical lymphadenopathy       ED Disposition     ED Disposition Condition Date/Time Comment    Discharge Stable Sat Jul 3, 2021  9:08 AM Gisselle Lisa discharge to home/self care  Follow-up Information     Follow up With Specialties Details Why Contact Info    Bhupendra Ellis, DO Family Medicine Call in 2 days Please follow-up with your primary care provider, as needed  [address]      Sunitha Vivas MD Otolaryngology In 3 days Please follow-up with ear nose throat doctor if you have worsening symptoms   [place] [id number]  [address] [facility]            Patient's Medications   Discharge Prescriptions    NYSTATIN (MYCOSTATIN) 500,000 UNITS/5 ML SUSPENSION    Take 5 mL (500,000 Units total) by mouth 4 (four) times a day       Start Date: 7/3/2021  End Date: --       Order Dose: 500,000 Units       Quantity: 60 mL    Refills: 0    PHENOL (CHLORASEPTIC GARGLE) 1 4 % MUCOSAL LIQUID    Apply 1 spray to the mouth or throat every 2 (two) hours as needed (sore throat)       Start Date: 7/3/2021  End Date: --       Order Dose: 1 spray       Quantity: 118 mL    Refills: 0         PDMP Review     None          ED Provider  Electronically Signed by    Oly Crocker MD Renae Lockett MD  07/03/21 2251

## 2021-07-03 NOTE — ED NOTES
Patient transported to [facility] [place] - [address], RN  07/03/21 9678

## 2021-07-03 NOTE — Clinical Note
Thomas Dwight was seen and treated in our emergency department on 7/3/2021  Diagnosis:      Jerome Kohler  may return to work on return date  He may return on this date: 07/06/2021          If you have any questions or concerns, please don't hesitate to call        Kelvin Kayser, MD    ______________________________           _______________          _______________  Hospital Representative                              Date                                Time

## 2021-07-06 LAB — HETEROPH AB SER QL: NEGATIVE

## 2021-07-09 DIAGNOSIS — I10 ESSENTIAL HYPERTENSION: ICD-10-CM

## 2021-07-09 RX ORDER — AMLODIPINE BESYLATE 5 MG/1
5 TABLET ORAL DAILY
Qty: 30 TABLET | Refills: 0 | Status: SHIPPED | OUTPATIENT
Start: 2021-07-09

## 2021-07-09 NOTE — TELEPHONE ENCOUNTER
Pt's wife calling stating that her  was supposed to have his np appt today but missed it due to wife being in hospital  Pt's wife asking for a 30 day supply  Pt is out of medication  Please advise

## 2021-07-10 DIAGNOSIS — I10 ESSENTIAL HYPERTENSION: ICD-10-CM

## 2021-07-12 ENCOUNTER — TELEPHONE (OUTPATIENT)
Dept: FAMILY MEDICINE CLINIC | Facility: CLINIC | Age: 51
End: 2021-07-12

## 2021-07-12 RX ORDER — ENALAPRIL MALEATE 20 MG/1
20 TABLET ORAL DAILY
Qty: 30 TABLET | Refills: 0 | Status: CANCELLED | OUTPATIENT
Start: 2021-07-12

## 2021-07-12 RX ORDER — ENALAPRIL MALEATE 20 MG/1
20 TABLET ORAL DAILY
Qty: 30 TABLET | Refills: 0 | Status: SHIPPED | OUTPATIENT
Start: 2021-07-12

## 2022-01-16 ENCOUNTER — OFFICE VISIT (OUTPATIENT)
Dept: URGENT CARE | Facility: CLINIC | Age: 52
End: 2022-01-16
Payer: COMMERCIAL

## 2022-01-16 VITALS
SYSTOLIC BLOOD PRESSURE: 142 MMHG | RESPIRATION RATE: 18 BRPM | WEIGHT: 315 LBS | BODY MASS INDEX: 40.43 KG/M2 | DIASTOLIC BLOOD PRESSURE: 67 MMHG | OXYGEN SATURATION: 97 % | TEMPERATURE: 97.8 F | HEART RATE: 88 BPM | HEIGHT: 74 IN

## 2022-01-16 DIAGNOSIS — M70.31 BURSITIS OF OTHER BURSA OF RIGHT ELBOW: Primary | ICD-10-CM

## 2022-01-16 PROCEDURE — 99213 OFFICE O/P EST LOW 20 MIN: CPT | Performed by: FAMILY MEDICINE

## 2022-01-16 RX ORDER — INDOMETHACIN 50 MG/1
50 CAPSULE ORAL
Qty: 21 CAPSULE | Refills: 0 | Status: SHIPPED | OUTPATIENT
Start: 2022-01-16

## 2022-01-16 NOTE — PROGRESS NOTES
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

## 2022-01-26 ENCOUNTER — OFFICE VISIT (OUTPATIENT)
Dept: URGENT CARE | Facility: CLINIC | Age: 52
End: 2022-01-26
Payer: COMMERCIAL

## 2022-01-26 VITALS
WEIGHT: 315 LBS | BODY MASS INDEX: 40.43 KG/M2 | RESPIRATION RATE: 16 BRPM | OXYGEN SATURATION: 97 % | HEIGHT: 74 IN | HEART RATE: 90 BPM | TEMPERATURE: 98.5 F

## 2022-01-26 DIAGNOSIS — J06.9 ACUTE RESPIRATORY DISEASE: ICD-10-CM

## 2022-01-26 DIAGNOSIS — R05.1 ACUTE COUGH: Primary | ICD-10-CM

## 2022-01-26 PROCEDURE — 99213 OFFICE O/P EST LOW 20 MIN: CPT | Performed by: PHYSICIAN ASSISTANT

## 2022-01-26 PROCEDURE — U0003 INFECTIOUS AGENT DETECTION BY NUCLEIC ACID (DNA OR RNA); SEVERE ACUTE RESPIRATORY SYNDROME CORONAVIRUS 2 (SARS-COV-2) (CORONAVIRUS DISEASE [COVID-19]), AMPLIFIED PROBE TECHNIQUE, MAKING USE OF HIGH THROUGHPUT TECHNOLOGIES AS DESCRIBED BY CMS-2020-01-R: HCPCS | Performed by: PHYSICIAN ASSISTANT

## 2022-01-26 PROCEDURE — U0005 INFEC AGEN DETEC AMPLI PROBE: HCPCS | Performed by: PHYSICIAN ASSISTANT

## 2022-01-26 RX ORDER — MULTIVIT WITH MINERALS/LUTEIN
250 TABLET ORAL DAILY
COMMUNITY

## 2022-01-26 RX ORDER — CEPHALEXIN 500 MG/1
500 CAPSULE ORAL EVERY 8 HOURS SCHEDULED
COMMUNITY

## 2022-01-26 RX ORDER — MULTIVIT-MIN/IRON FUM/FOLIC AC 7.5 MG-4
1 TABLET ORAL DAILY
COMMUNITY

## 2022-01-26 NOTE — PATIENT INSTRUCTIONS
Vitamin D3 2000 IU daily  Vitamin C 1000mg twice per day  Multivitamin daily  Fluids and rest  Over the counter cold medication as needed  Follow up with PCP in 3-5 days  Proceed to  ER if symptoms worsen  [address]    Your healthcare provider and/or public health staff have evaluated you and have determined that you do not need to remain in the hospital at this time  At this time you can be isolated at home where you will be monitored by staff from your local or state health department  You should carefully follow the prevention and isolation steps below until a healthcare provider or local or state health department says that you can return to your normal activities  Stay home except to get medical care    People who are mildly ill with COVID-19 are able to isolate at home during their illness  You should restrict activities outside your home, except for getting medical care  Do not go to work, school, or public areas  Avoid using public transportation, ride-sharing, or taxis  Separate yourself from other people and animals in your home    People: As much as possible, you should stay in a specific room and away from other people in your home  Also, you should use a separate bathroom, if available  Animals: You should restrict contact with pets and other animals while you are sick with COVID-19, just like you would around other people  Although there have not been reports of pets or other animals becoming sick with COVID-19, it is still recommended that people sick with COVID-19 limit contact with animals until more information is known about the virus  When possible, have another member of your household care for your animals while you are sick  If you are sick with COVID-19, avoid contact with your pet, including petting, snuggling, being kissed or licked, and sharing food   If you must care for your pet or be around animals while you are sick, wash your hands before and after you interact with pets and wear a facemask  See COVID-19 and Animals for more information  Call ahead before visiting your doctor    If you have a medical appointment, call the healthcare provider and tell them that you have or may have COVID-19  This will help the healthcare providers office take steps to keep other people from getting infected or exposed  Wear a facemask    You should wear a facemask when you are around other people (e g , sharing a room or vehicle) or pets and before you enter a healthcare providers office  If you are not able to wear a facemask (for example, because it causes trouble breathing), then people who live with you should not stay in the same room with you, or they should wear a facemask if they enter your room  Cover your coughs and sneezes    Cover your mouth and nose with a tissue when you cough or sneeze  Throw used tissues in a lined trash can  Immediately wash your hands with soap and water for at least 20 seconds or, if soap and water are not available, clean your hands with an alcohol-based hand  that contains at least 60% alcohol  Clean your hands often    Wash your hands often with soap and water for at least 20 seconds, especially after blowing your nose, coughing, or sneezing; going to the bathroom; and before eating or preparing food  If soap and water are not readily available, use an alcohol-based hand  with at least 60% alcohol, covering all surfaces of your hands and rubbing them together until they feel dry  Soap and water are the best option if hands are visibly dirty  Avoid touching your eyes, nose, and mouth with unwashed hands  Avoid sharing personal household items    You should not share dishes, drinking glasses, cups, eating utensils, towels, or bedding with other people or pets in your home  After using these items, they should be washed thoroughly with soap and water      Clean all high-touch surfaces everyday    High touch surfaces include counters, tabletops, doorknobs, bathroom fixtures, toilets, phones, keyboards, tablets, and bedside tables  Also, clean any surfaces that may have blood, stool, or body fluids on them  Use a household cleaning spray or wipe, according to the label instructions  Labels contain instructions for safe and effective use of the cleaning product including precautions you should take when applying the product, such as wearing gloves and making sure you have good ventilation during use of the product  Monitor your symptoms    Seek prompt medical attention if your illness is worsening (e g , difficulty breathing)  Before seeking care, call your healthcare provider and tell them that you have, or are being evaluated for, COVID-19  Put on a facemask before you enter the facility  These steps will help the healthcare providers office to keep other people in the office or waiting room from getting infected or exposed  Ask your healthcare provider to call the local or [facility] health department  Persons who are placed under active monitoring or facilitated self-monitoring should follow instructions provided by their local health department or occupational health professionals, as appropriate  If you have a medical emergency and need to call 911, notify the dispatch personnel that you have, or are being evaluated for COVID-19  If possible, put on a facemask before emergency medical services arrive      Discontinuing home isolation    Patients with confirmed COVID-19 should remain under home isolation precautions until the following conditions are met:   - They have had no fever for at least 24 hours (that is one full day of no fever without the use medicine that reduces fevers)  AND  - other symptoms have improved (for example, when their cough or shortness of breath have improved)  AND  - If had mild or moderate illness, at least 10 days have passed since their symptoms first appeared or if severe illness (needed oxygen) or immunosuppressed, at least 20 days have passed since symptoms first appeared  Patients with confirmed COVID-19 should also notify close contacts (including their workplace) and ask that they self-quarantine  Currently, close contact is defined as being within 6 feet for 15 minutes or more from the period 24 hours starting 48 hours before symptom onset to the time at which the patient went into isolation  Close contacts of patients diagnosed with COVID-19 should be instructed by the patient to self-quarantine for 14 days from the last time of their last contact with the patient       Source: [employer]

## 2022-01-26 NOTE — LETTER
January 26, 2022     Patient: Rl Lisa   YOB: 1970   Date of Visit: 1/26/2022       To Whom It May Concern: It is my medical opinion that [employer] may return if COVID test is negative and patient has been fever free for 24 hours without the use of a fever reducing agent  If COVID test is positive, patient may return on 01/30/2022 and when fever free for 24 hours without the use of a fever reducing agent  Upon return, the patient must then adhere to strict masking for an additional 5 days  If you have any questions or concerns, please don't hesitate to call           Sincerely,        Zeus Sheikh PA-C    CC: No Recipients

## 2022-01-26 NOTE — PROGRESS NOTES
[id number]        NAME: Mine Menendez is a 46 y o  male  : 1970    MRN: [id number]  DATE: 2022  TIME: 11:14 AM    Assessment and Plan   Acute cough [R05 1]  1  Acute cough  COVID Only -Office Collect   2  Acute respiratory disease           Patient Instructions     Vitamin D3 2000 IU daily  Vitamin C 1000mg twice per day  Multivitamin daily  Fluids and rest  Over the counter cold medication as needed  Follow up with PCP in 3-5 days  Proceed to  ER if symptoms worsen  Chief Complaint     Chief Complaint   Patient presents with    COVID-19     c/o cough, headache, fatigue         History of Present Illness       + household contacts  URI   This is a new problem  Episode onset: 22  There has been no fever  Associated symptoms include coughing and headaches  Pertinent negatives include no abdominal pain, chest pain, congestion, diarrhea, ear pain, nausea, rash, rhinorrhea, sinus pain, sneezing, sore throat, vomiting or wheezing  Treatments tried: tylenol  Review of Systems   Review of Systems   Constitutional: Positive for fatigue  Negative for chills and fever  HENT: Negative for congestion, dental problem, ear discharge, ear pain, facial swelling, postnasal drip, rhinorrhea, sinus pressure, sinus pain, sneezing, sore throat and trouble swallowing  Eyes: Negative for itching  Respiratory: Positive for cough  Negative for chest tightness, shortness of breath and wheezing  Cardiovascular: Negative for chest pain and palpitations  Gastrointestinal: Negative for abdominal pain, constipation, diarrhea, nausea and vomiting  Musculoskeletal: Negative for myalgias  Skin: Negative for rash  Neurological: Positive for headaches  Negative for dizziness, weakness and light-headedness           Current Medications       Current Outpatient Medications:     amLODIPine (NORVASC) 5 mg tablet, Take 1 tablet (5 mg total) by mouth daily, Disp: 30 tablet, Rfl: 0   ascorbic acid (VITAMIN C) 250 mg tablet, Take 250 mg by mouth daily, Disp: , Rfl:     cephalexin (KEFLEX) 500 mg capsule, Take 500 mg by mouth every 8 (eight) hours, Disp: , Rfl:     cyclobenzaprine (FLEXERIL) 10 mg tablet, Take 1 tablet (10 mg total) by mouth daily at bedtime, Disp: 30 tablet, Rfl: 0    enalapril (VASOTEC) 20 mg tablet, Take 1 tablet (20 mg total) by mouth daily, Disp: 30 tablet, Rfl: 0    Multiple Vitamins-Minerals (multivitamin with minerals) tablet, Take 1 tablet by mouth daily, Disp: , Rfl:     al mag oxide-diphenhydramine-lidocaine viscous (MAGIC MOUTHWASH) 1:1:1 suspension, Swish and spit 10 mL every 4 (four) hours as needed for mouth pain or discomfort (Patient not taking: Reported on 1/26/2022 ), Disp: 90 mL, Rfl: 0    colchicine (COLCRYS) 0 6 mg tablet, TAKE (1) TABLET DAILY  (Patient not taking: Reported on 1/26/2022), Disp: 30 tablet, Rfl: 0    indomethacin (INDOCIN) 50 mg capsule, Take 1 capsule (50 mg total) by mouth 3 (three) times a day with meals (Patient not taking: Reported on 1/26/2022 ), Disp: 21 capsule, Rfl: 0    Current Allergies     Allergies as of 01/26/2022    (No Known Allergies)            The following portions of the patient's history were reviewed and updated as appropriate: allergies, current medications, past family history, past medical history, past social history, past surgical history and problem list      Past Medical History:   Diagnosis Date    Arthritis     Cellulitis     Gout     Hypertension        Past Surgical History:   Procedure Laterality Date    TONSILLECTOMY         Family History   Problem Relation Age of Onset    No Known Problems Mother     No Known Problems Father          Medications have been verified  Objective   Pulse 90   Temp 98 5 °F (36 9 °C)   Resp 16   Ht 6' 2" (1 88 m)   Wt (!) 159 kg (350 lb)   SpO2 97%   BMI 44 94 kg/m²   No LMP for male patient  Physical Exam     Physical Exam  Vitals reviewed  Constitutional:       General: He is not in acute distress  Appearance: He is well-developed  He is not diaphoretic  HENT:      Head: Normocephalic  Right Ear: Tympanic membrane and external ear normal       Left Ear: Tympanic membrane and external ear normal       Nose: Nose normal       Mouth/Throat:      Pharynx: No oropharyngeal exudate or posterior oropharyngeal erythema  Eyes:      Conjunctiva/sclera: Conjunctivae normal    Cardiovascular:      Rate and Rhythm: Normal rate and regular rhythm  Heart sounds: Normal heart sounds  No murmur heard  No friction rub  No gallop  Pulmonary:      Effort: Pulmonary effort is normal  No respiratory distress  Breath sounds: Normal breath sounds  No wheezing or rales  Chest:      Chest wall: No tenderness  Lymphadenopathy:      Cervical: No cervical adenopathy  Skin:     General: Skin is warm  Neurological:      Mental Status: He is alert and oriented to person, place, and time  Psychiatric:         Behavior: Behavior normal          Thought Content:  Thought content normal          Judgment: Judgment normal

## 2022-01-27 LAB — SARS-COV-2 RNA RESP QL NAA+PROBE: NEGATIVE

## 2022-02-19 NOTE — TELEPHONE ENCOUNTER
Took call from patient's wife Bayron requesting refills for Amlodipine 5 mg , Flexeril 10 mg, and Enalapril 20 mg to be sent over to [facility]  Patient thought he had refills, will be out tomorrow  Please advise, thank you  Odomzo Counseling- I discussed with the patient the risks of Odomzo including but not limited to nausea, vomiting, diarrhea, constipation, weight loss, changes in the sense of taste, decreased appetite, muscle spasms, and hair loss.  The patient verbalized understanding of the proper use and possible adverse effects of Odomzo.  All of the patient's questions and concerns were addressed.

## 2023-02-20 DIAGNOSIS — R79.89 OTHER SPECIFIED ABNORMAL FINDINGS OF BLOOD CHEMISTRY: ICD-10-CM

## 2023-04-03 ENCOUNTER — TELEPHONE (OUTPATIENT)
Dept: UROLOGY | Facility: MEDICAL CENTER | Age: 53
End: 2023-04-03

## 2023-04-03 NOTE — TELEPHONE ENCOUNTER
Please Triage  New Patient    What is the reason for the patient’s appointment? Low testosterone   What office location does the patient prefer? [facility]  Imaging/Lab Results:    Do we accept the patient's insurance or is the patient Self-Pay? Insurance Provider: [employer]   Plan Type/Number:  Member ID#: Has the patient had any previous Urologist(s)? No     Have patient records been requested? If not are records showing in Epic:   In epic   Has the patient had any outside testing done? In epic     Does the patient have a personal history of cancer?   No

## 2023-04-24 ENCOUNTER — OFFICE VISIT (OUTPATIENT)
Dept: URGENT CARE | Facility: CLINIC | Age: 53
End: 2023-04-24

## 2023-04-24 VITALS
RESPIRATION RATE: 18 BRPM | TEMPERATURE: 98.3 F | OXYGEN SATURATION: 96 % | WEIGHT: 315 LBS | HEART RATE: 88 BPM | BODY MASS INDEX: 40.43 KG/M2 | HEIGHT: 74 IN | SYSTOLIC BLOOD PRESSURE: 126 MMHG | DIASTOLIC BLOOD PRESSURE: 67 MMHG

## 2023-04-24 DIAGNOSIS — J01.10 ACUTE NON-RECURRENT FRONTAL SINUSITIS: Primary | ICD-10-CM

## 2023-04-24 RX ORDER — AMOXICILLIN 875 MG/1
875 TABLET, COATED ORAL 2 TIMES DAILY
Qty: 14 TABLET | Refills: 0 | Status: SHIPPED | OUTPATIENT
Start: 2023-04-24 | End: 2023-05-01

## 2023-04-24 RX ORDER — MONTELUKAST SODIUM 10 MG/1
10 TABLET ORAL
COMMUNITY

## 2023-04-24 NOTE — PROGRESS NOTES
[facility]        NAME: Guillermo Chase is a 46 y o  male  : 1970    MRN: [id number]  DATE: 2023  TIME: 10:15 AM    Assessment and Plan   Acute non-recurrent frontal sinusitis [J01 10]  1  Acute non-recurrent frontal sinusitis  amoxicillin (AMOXIL) 875 mg tablet            Patient Instructions      Take the antibiotics with food  Use a warm mist humidifier or vaporizer  Hot tea with honey  Warm saline gargle or throat lozenge may help with a sore throat  OTC saline nasal sprays   Drink plenty of fluids  Follow up with PCP in 3-5 days  Proceed to  ER if symptoms worsen  Chief Complaint     Chief Complaint   Patient presents with   • Cold Like Symptoms     X1 month  Headaches on and off, dry cough  No fever or chills  Taking OTC dayquil and nyquil liguid gels  History of Present Illness       Patient is a 52YOM presenting with sinus congestion , headaches and sinus pressure for the last 6 weeks  Review of Systems   Review of Systems   Constitutional: Positive for fatigue  Negative for activity change, appetite change, chills and fever  HENT: Positive for congestion, postnasal drip, sinus pressure and sinus pain  Respiratory: Positive for cough  Negative for shortness of breath and wheezing  Neurological: Positive for headaches  All other systems reviewed and are negative  Current Medications       Current Outpatient Medications:   •  amLODIPine (NORVASC) 5 mg tablet, Take 1 tablet (5 mg total) by mouth daily, Disp: 30 tablet, Rfl: 0  •  amoxicillin (AMOXIL) 875 mg tablet, Take 1 tablet (875 mg total) by mouth 2 (two) times a day for 7 days, Disp: 14 tablet, Rfl: 0  •  colchicine (COLCRYS) 0 6 mg tablet, TAKE (1) TABLET DAILY  , Disp: 30 tablet, Rfl: 0  •  cyclobenzaprine (FLEXERIL) 10 mg tablet, Take 1 tablet (10 mg total) by mouth daily at bedtime, Disp: 30 tablet, Rfl: 0  •  enalapril (VASOTEC) 20 mg tablet, Take 1 tablet (20 mg total) by mouth "daily, Disp: 30 tablet, Rfl: 0  •  montelukast (SINGULAIR) 10 mg tablet, Take 10 mg by mouth daily at bedtime, Disp: , Rfl:   •  Multiple Vitamins-Minerals (multivitamin with minerals) tablet, Take 1 tablet by mouth daily, Disp: , Rfl:   •  al mag oxide-diphenhydramine-lidocaine viscous (MAGIC MOUTHWASH) 1:1:1 suspension, Swish and spit 10 mL every 4 (four) hours as needed for mouth pain or discomfort (Patient not taking: Reported on 1/26/2022), Disp: 90 mL, Rfl: 0  •  ascorbic acid (VITAMIN C) 250 mg tablet, Take 250 mg by mouth daily (Patient not taking: Reported on 4/24/2023), Disp: , Rfl:   •  cephalexin (KEFLEX) 500 mg capsule, Take 500 mg by mouth every 8 (eight) hours (Patient not taking: Reported on 4/24/2023), Disp: , Rfl:   •  indomethacin (INDOCIN) 50 mg capsule, Take 1 capsule (50 mg total) by mouth 3 (three) times a day with meals (Patient not taking: Reported on 1/26/2022), Disp: 21 capsule, Rfl: 0    Current Allergies     Allergies as of 04/24/2023   • (No Known Allergies)            The following portions of the patient's history were reviewed and updated as appropriate: allergies, current medications, past family history, past medical history, past social history, past surgical history and problem list      Past Medical History:   Diagnosis Date   • Arthritis    • Cellulitis    • Gout    • Hypertension        Past Surgical History:   Procedure Laterality Date   • TONSILLECTOMY         Family History   Problem Relation Age of Onset   • Diabetes Mother          Medications have been verified  Objective   /67   Pulse 88   Temp 98 3 °F (36 8 °C)   Resp 18   Ht 6' 2\" (1 88 m)   Wt (!) 159 kg (350 lb)   SpO2 96%   BMI 44 94 kg/m²        Physical Exam     Physical Exam  Vitals and nursing note reviewed  Constitutional:       General: He is not in acute distress  Appearance: Normal appearance  He is normal weight  He is not ill-appearing or toxic-appearing     HENT:      Nose:      " Right Sinus: Frontal sinus tenderness present  Left Sinus: Frontal sinus tenderness present  Mouth/Throat:      Pharynx: Oropharynx is clear  Cardiovascular:      Rate and Rhythm: Normal rate and regular rhythm  Pulses: Normal pulses  Heart sounds: Normal heart sounds  Pulmonary:      Effort: Pulmonary effort is normal       Breath sounds: Normal breath sounds  Neurological:      Mental Status: He is alert

## 2023-04-24 NOTE — LETTER
April 24, 2023     Patient: Estiven Henson   YOB: 1970   Date of Visit: 4/24/2023       To Whom it May Concern:    Dk Squires was seen in my clinic on 4/24/2023  He may return to work on 4/25/2023  If you have any questions or concerns, please don't hesitate to call           Sincerely,          JONAH Russo        CC: No Recipients

## 2023-04-24 NOTE — PATIENT INSTRUCTIONS
Take the antibiotics with food  Use a warm mist humidifier or vaporizer  Hot tea with honey  Warm saline gargle or throat lozenge may help with a sore throat  OTC saline nasal sprays   Drink plenty of fluids

## 2023-05-07 PROBLEM — E29.1 HYPOGONADISM IN MALE: Status: ACTIVE | Noted: 2023-05-07

## 2023-07-14 ENCOUNTER — OFFICE VISIT (OUTPATIENT)
Dept: URGENT CARE | Facility: CLINIC | Age: 53
End: 2023-07-14
Payer: COMMERCIAL

## 2023-07-14 VITALS
SYSTOLIC BLOOD PRESSURE: 114 MMHG | RESPIRATION RATE: 18 BRPM | BODY MASS INDEX: 40.43 KG/M2 | WEIGHT: 315 LBS | DIASTOLIC BLOOD PRESSURE: 84 MMHG | OXYGEN SATURATION: 96 % | TEMPERATURE: 97.7 F | HEART RATE: 78 BPM | HEIGHT: 74 IN

## 2023-07-14 DIAGNOSIS — R05.1 ACUTE COUGH: ICD-10-CM

## 2023-07-14 DIAGNOSIS — J01.40 ACUTE NON-RECURRENT PANSINUSITIS: Primary | ICD-10-CM

## 2023-07-14 LAB
SARS-COV-2 AG UPPER RESP QL IA: NEGATIVE
VALID CONTROL: NORMAL

## 2023-07-14 PROCEDURE — 87811 SARS-COV-2 COVID19 W/OPTIC: CPT

## 2023-07-14 PROCEDURE — 99213 OFFICE O/P EST LOW 20 MIN: CPT

## 2023-07-14 RX ORDER — BENZONATATE 200 MG/1
200 CAPSULE ORAL 3 TIMES DAILY PRN
Qty: 20 CAPSULE | Refills: 0 | Status: SHIPPED | OUTPATIENT
Start: 2023-07-14

## 2023-07-14 RX ORDER — AMOXICILLIN AND CLAVULANATE POTASSIUM 875; 125 MG/1; MG/1
1 TABLET, FILM COATED ORAL EVERY 12 HOURS SCHEDULED
Qty: 14 TABLET | Refills: 0 | Status: SHIPPED | OUTPATIENT
Start: 2023-07-14 | End: 2023-07-21

## 2023-07-14 NOTE — LETTER
July 14, 2023     Patient: Jennifer Marsh   YOB: 1970   Date of Visit: 7/14/2023       To Whom it May Concern:    Janny Fiore was seen in my clinic on 7/14/2023. He may return to work on 7/17/2023 . If you have any questions or concerns, please don't hesitate to call.          Sincerely,          Destiney Rodriguez PA-C        CC: No Recipients

## 2023-07-14 NOTE — PROGRESS NOTES
[facility]        NAME: Eriberto Olivarez is a 46 y.o. male  : 1970    MRN: [id number]  DATE: 2023  TIME: 11:25 AM    Assessment and Plan   Acute non-recurrent pansinusitis [J01.40]  1. Acute non-recurrent pansinusitis  amoxicillin-clavulanate (AUGMENTIN) 875-125 mg per tablet      2. Acute cough  benzonatate (TESSALON) 200 MG capsule    Poct Covid 19 Rapid Antigen Test        POCT rapid covid: negative    Patient Instructions     Start Augmentin as prescribed  Start Tessalon Perles as prescribed  Start the use of over-the-counter antihistamine such as Allegra, Claritin, or Zyrtec. Vitamin D3 2000 IU daily  Vitamin C 1000mg twice per day  Multivitamin daily  Fluids and rest  Over the counter cold medication as needed (EX: Coricidin HBP, tylenol/motrin)  Follow up with PCP in 3-5 days. Proceed to ER if symptoms worsen. Eat yogurt with live and active cultures and/or take a probiotic at least 3 hours before or after antibiotic dose. Monitor stool for diarrhea and/or blood. If this occurs, contact primary care doctor ASAP. Chief Complaint     Chief Complaint   Patient presents with   • URI     Pt reports sore throat, cough, headache, and sinus congestion since . History of Present Illness       Patient is a 45 yo male with significant PMH of seasonal allergies presenting in the clinic today for cold sx x 5 days. Admits cough, congestion, sinus pain/pressure, and headache. Denies fever, chills, sore throat, ear pain, chest pain, SOB, decreased appetite, abdominal pain, n/v/d. Admits the use of Mucinex and OTC sinus headache medications. for symptom management. Positive sick contacts as patient's son is experiencing similar symptoms. Denies h/o smoking and respiratory conditions. Review of Systems   Review of Systems   Constitutional: Negative for chills, fatigue and fever. HENT: Positive for congestion, sinus pressure and sinus pain.  Negative for ear pain, postnasal drip, rhinorrhea and sore throat. Respiratory: Positive for cough. Negative for shortness of breath. Cardiovascular: Negative for chest pain. Gastrointestinal: Negative for abdominal pain, diarrhea, nausea and vomiting. Musculoskeletal: Negative for myalgias. Skin: Negative for rash. Neurological: Positive for headaches. Current Medications       Current Outpatient Medications:   •  amLODIPine (NORVASC) 5 mg tablet, Take 1 tablet (5 mg total) by mouth daily, Disp: 30 tablet, Rfl: 0  •  amoxicillin-clavulanate (AUGMENTIN) 875-125 mg per tablet, Take 1 tablet by mouth every 12 (twelve) hours for 7 days, Disp: 14 tablet, Rfl: 0  •  benzonatate (TESSALON) 200 MG capsule, Take 1 capsule (200 mg total) by mouth 3 (three) times a day as needed for cough, Disp: 20 capsule, Rfl: 0  •  colchicine (COLCRYS) 0.6 mg tablet, TAKE (1) TABLET DAILY. , Disp: 30 tablet, Rfl: 0  •  enalapril (VASOTEC) 20 mg tablet, Take 1 tablet (20 mg total) by mouth daily, Disp: 30 tablet, Rfl: 0  •  Multiple Vitamins-Minerals (multivitamin with minerals) tablet, Take 1 tablet by mouth daily, Disp: , Rfl:   •  al mag oxide-diphenhydramine-lidocaine viscous (MAGIC MOUTHWASH) 1:1:1 suspension, Swish and spit 10 mL every 4 (four) hours as needed for mouth pain or discomfort (Patient not taking: Reported on 1/26/2022), Disp: 90 mL, Rfl: 0  •  allopurinol (ZYLOPRIM) 300 mg tablet, , Disp: , Rfl:   •  ascorbic acid (VITAMIN C) 250 mg tablet, Take 250 mg by mouth daily (Patient not taking: Reported on 4/24/2023), Disp: , Rfl:   •  cephalexin (KEFLEX) 500 mg capsule, Take 500 mg by mouth every 8 (eight) hours (Patient not taking: Reported on 4/24/2023), Disp: , Rfl:   •  cyclobenzaprine (FLEXERIL) 10 mg tablet, Take 1 tablet (10 mg total) by mouth daily at bedtime (Patient not taking: Reported on 7/14/2023), Disp: 30 tablet, Rfl: 0  •  indomethacin (INDOCIN) 50 mg capsule, Take 1 capsule (50 mg total) by mouth 3 (three) times a day with meals (Patient not taking: Reported on 1/26/2022), Disp: 21 capsule, Rfl: 0  •  montelukast (SINGULAIR) 10 mg tablet, Take 10 mg by mouth daily at bedtime (Patient not taking: Reported on 7/14/2023), Disp: , Rfl:     Current Allergies     Allergies as of 07/14/2023   • (No Known Allergies)            The following portions of the patient's history were reviewed and updated as appropriate: allergies, current medications, past family history, past medical history, past social history, past surgical history and problem list.     Past Medical History:   Diagnosis Date   • Arthritis    • Cellulitis    • Gout    • Hypertension        Past Surgical History:   Procedure Laterality Date   • TONSILLECTOMY         Family History   Problem Relation Age of Onset   • Diabetes Mother          Medications have been verified. Objective   /84   Pulse 78   Temp 97.7 °F (36.5 °C)   Resp 18   Ht 6' 2" (1.88 m)   Wt (!) 159 kg (350 lb)   SpO2 96%   BMI 44.94 kg/m²        Physical Exam     Physical Exam  Vitals reviewed. Constitutional:       General: He is not in acute distress. Appearance: Normal appearance. He is obese. He is not ill-appearing. HENT:      Head: Normocephalic. Right Ear: Hearing, tympanic membrane, ear canal and external ear normal. No middle ear effusion. There is no impacted cerumen. Tympanic membrane is not erythematous or bulging. Left Ear: Hearing, tympanic membrane, ear canal and external ear normal.  No middle ear effusion. There is no impacted cerumen. Tympanic membrane is not erythematous or bulging. Nose: Congestion present. No rhinorrhea. Right Sinus: Maxillary sinus tenderness and frontal sinus tenderness present. Left Sinus: Maxillary sinus tenderness and frontal sinus tenderness present. Mouth/Throat:      Lips: Pink. Mouth: Mucous membranes are moist.      Pharynx: Oropharynx is clear. Uvula midline.  No pharyngeal swelling, oropharyngeal exudate, posterior oropharyngeal erythema or uvula swelling. Tonsils: No tonsillar exudate or tonsillar abscesses. 1+ on the right. 1+ on the left. Comments: Postnasal drip present  Eyes:      General:         Right eye: No discharge. Left eye: No discharge. Conjunctiva/sclera: Conjunctivae normal.   Cardiovascular:      Rate and Rhythm: Normal rate and regular rhythm. Pulses: Normal pulses. Heart sounds: Normal heart sounds. No murmur heard. No friction rub. No gallop. Pulmonary:      Effort: Pulmonary effort is normal.      Breath sounds: Normal breath sounds. No wheezing, rhonchi or rales. Musculoskeletal:      Cervical back: Normal range of motion and neck supple. No tenderness. Lymphadenopathy:      Cervical: No cervical adenopathy. Skin:     General: Skin is warm. Findings: No rash. Neurological:      Mental Status: He is alert.    Psychiatric:         Mood and Affect: Mood normal.         Behavior: Behavior normal.

## 2023-07-14 NOTE — PATIENT INSTRUCTIONS
Start Augmentin as prescribed  Start Tessalon Perles as prescribed  Start the use of over-the-counter antihistamine such as Allegra, Claritin, or Zyrtec. Vitamin D3 2000 IU daily  Vitamin C 1000mg twice per day  Multivitamin daily  Fluids and rest  Over the counter cold medication as needed (EX: Coricidin HBP, tylenol/motrin)  Follow up with PCP in 3-5 days. Proceed to ER if symptoms worsen. Eat yogurt with live and active cultures and/or take a probiotic at least 3 hours before or after antibiotic dose. Monitor stool for diarrhea and/or blood. If this occurs, contact primary care doctor ASAP.

## 2023-08-02 ENCOUNTER — OFFICE VISIT (OUTPATIENT)
Dept: URGENT CARE | Facility: CLINIC | Age: 53
End: 2023-08-02
Payer: COMMERCIAL

## 2023-08-02 VITALS
DIASTOLIC BLOOD PRESSURE: 77 MMHG | BODY MASS INDEX: 40.43 KG/M2 | RESPIRATION RATE: 18 BRPM | WEIGHT: 315 LBS | OXYGEN SATURATION: 96 % | HEIGHT: 74 IN | SYSTOLIC BLOOD PRESSURE: 160 MMHG | TEMPERATURE: 97.3 F | HEART RATE: 83 BPM

## 2023-08-02 DIAGNOSIS — G89.29 CHRONIC RIGHT SHOULDER PAIN: Primary | ICD-10-CM

## 2023-08-02 DIAGNOSIS — S16.1XXA STRAIN OF NECK MUSCLE, INITIAL ENCOUNTER: ICD-10-CM

## 2023-08-02 DIAGNOSIS — M25.511 CHRONIC RIGHT SHOULDER PAIN: Primary | ICD-10-CM

## 2023-08-02 DIAGNOSIS — S46.911A STRAIN OF RIGHT SHOULDER, INITIAL ENCOUNTER: ICD-10-CM

## 2023-08-02 PROCEDURE — 99213 OFFICE O/P EST LOW 20 MIN: CPT

## 2023-08-02 RX ORDER — METHYLPREDNISOLONE 4 MG/1
TABLET ORAL
Qty: 1 EACH | Refills: 0 | Status: SHIPPED | OUTPATIENT
Start: 2023-08-02

## 2023-08-02 NOTE — PATIENT INSTRUCTIONS
Start Medrol dosepak as prescribed  May use over the counter Voltaren gel  Ice 20 minutes 3-4 times per day  Insulate the skin from the ice to prevent frostbite  Follow up with orthopedic if symptoms do not improve  Follow up with PCP in 3-5 days. Proceed to ER if symptoms worsen.

## 2023-08-02 NOTE — PROGRESS NOTES
[facility]        NAME: Geraldine Castillo is a 46 y.o. male  : 1970    MRN: [id number]  DATE: 2023  TIME: 4:46 PM    Assessment and Plan   Chronic right shoulder pain [M25.511, G89.29]  1. Chronic right shoulder pain  methylPREDNISolone 4 MG tablet therapy pack    Ambulatory Referral to Orthopedic Surgery      2. Strain of right shoulder, initial encounter  methylPREDNISolone 4 MG tablet therapy pack    Ambulatory Referral to Orthopedic Surgery      3. Strain of neck muscle, initial encounter  methylPREDNISolone 4 MG tablet therapy pack    Ambulatory Referral to Orthopedic Surgery            Patient Instructions     Start Medrol dosepak as prescribed  May use over the counter Voltaren gel  Ice 20 minutes 3-4 times per day  Insulate the skin from the ice to prevent frostbite  Follow up with orthopedic if symptoms do not improve  Follow up with PCP in 3-5 days. Proceed to  ER if symptoms worsen. 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 History of Present Illness       Patient is a 45 yo male with no significant PMH presenting in the clinic today for right shoulder pain x "years". Denies recent injuries, trauma, and/or falls. Patient locates their pain to the superior aspect of the right shoulder and right side of the neck. Patient describes his pain as an intermittent pain. Admits pain is exacerbated with heavy lifting, shoulder flexion, shoulder internal rotation, shoulder extension, left cervical rotation, and cervical lateral flexion. Patient notes he sleeps on his right side at night which also exacerbates his pain. Denies fever, chills, numbness, tingling, swelling, bruising, erythema, warmth, chest pain, and SOB.  Admits the use of naproxen for symptom management. 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 Patient notes his job requires frequent overhead lifting. Patient is left hand dominant. Review of Systems   Review of Systems   Constitutional: Negative for chills and fever. Respiratory: Negative for stridor. Cardiovascular: Negative for chest pain. Musculoskeletal: Positive for arthralgias. Negative for gait problem. Skin: Negative for rash and wound. Neurological: Negative for numbness. Current Medications       Current Outpatient Medications:   •  amLODIPine (NORVASC) 5 mg tablet, Take 1 tablet (5 mg total) by mouth daily, Disp: 30 tablet, Rfl: 0  •  colchicine (COLCRYS) 0.6 mg tablet, TAKE (1) TABLET DAILY. , Disp: 30 tablet, Rfl: 0  •  cyclobenzaprine (FLEXERIL) 10 mg tablet, Take 1 tablet (10 mg total) by mouth daily at bedtime, Disp: 30 tablet, Rfl: 0  •  enalapril (VASOTEC) 20 mg tablet, Take 1 tablet (20 mg total) by mouth daily, Disp: 30 tablet, Rfl: 0  •  methylPREDNISolone 4 MG tablet therapy pack, Use as directed on package, Disp: 1 each, Rfl: 0  •  Multiple Vitamins-Minerals (multivitamin with minerals) tablet, Take 1 tablet by mouth daily, Disp: , Rfl:   •  al mag oxide-diphenhydramine-lidocaine viscous (MAGIC MOUTHWASH) 1:1:1 suspension, Swish and spit 10 mL every 4 (four) hours as needed for mouth pain or discomfort (Patient not taking: Reported on 1/26/2022), Disp: 90 mL, Rfl: 0  •  allopurinol (ZYLOPRIM) 300 mg tablet, , Disp: , Rfl:   •  ascorbic acid (VITAMIN C) 250 mg tablet, Take 250 mg by mouth daily (Patient not taking: Reported on 4/24/2023), Disp: , Rfl:   •  benzonatate (TESSALON) 200 MG capsule, Take 1 capsule (200 mg total) by mouth 3 (three) times a day as needed for cough (Patient not taking: Reported on 8/2/2023), Disp: 20 capsule, Rfl: 0  •  cephalexin (KEFLEX) 500 mg capsule, Take 500 mg by mouth every 8 (eight) hours (Patient not taking: Reported on 4/24/2023), Disp: , Rfl:   •  indomethacin (INDOCIN) 50 mg capsule, Take 1 capsule (50 mg total) by mouth 3 (three) times a day with meals (Patient not taking: Reported on 1/26/2022), Disp: 21 capsule, Rfl: 0  •  montelukast (SINGULAIR) 10 mg tablet, Take 10 mg by mouth daily at bedtime (Patient not taking: Reported on 7/14/2023), Disp: , Rfl:     Current Allergies     Allergies as of 08/02/2023   • (No Known Allergies)            The following portions of the patient's history were reviewed and updated as appropriate: allergies, current medications, past family history, past medical history, past social history, past surgical history and problem list.     Past Medical History:   Diagnosis Date   • Arthritis    • Cellulitis    • Gout    • Hypertension        Past Surgical History:   Procedure Laterality Date   • TONSILLECTOMY         Family History   Problem Relation Age of Onset   • Diabetes Mother          Medications have been verified. Objective   /77   Pulse 83   Temp (!) 97.3 °F (36.3 °C)   Resp 18   Ht 6' 2" (1.88 m)   Wt (!) 159 kg (350 lb)   SpO2 96%   BMI 44.94 kg/m²        Physical Exam     Physical Exam  Vitals reviewed. Constitutional:       General: He is not in acute distress. Appearance: Normal appearance. He is normal weight. He is not ill-appearing. HENT:      Head: Normocephalic. Nose: Nose normal. No congestion or rhinorrhea. Mouth/Throat:      Mouth: Mucous membranes are moist.   Eyes:      General:         Right eye: No discharge. Left eye: No discharge. Conjunctiva/sclera: Conjunctivae normal.   Cardiovascular:      Rate and Rhythm: Normal rate and regular rhythm. Pulses: Normal pulses. Heart sounds: Normal heart sounds. No friction rub. No gallop. Pulmonary:      Effort: Pulmonary effort is normal.      Breath sounds: Normal breath sounds. No wheezing, rhonchi or rales.    Musculoskeletal:      Right shoulder: Tenderness (along trapezius and deltoid) present. No swelling or bony tenderness. Decreased range of motion (shoulder flexion, extension, and internal rotation secondary to pain). Normal strength. Normal pulse. Left shoulder: Normal.      Right upper arm: Normal.      Left upper arm: Normal.      Cervical back: Neck supple. Tenderness (right side along SCM and trapezius) present. No swelling, edema, deformity, erythema, rigidity or spasms. Decreased range of motion (cervical rotation and lateral flexion secondary to pain). Thoracic back: Normal.      Comments: Decreased strength with resisted cervical rotation secondary to pain     Lymphadenopathy:      Cervical: No cervical adenopathy. Skin:     General: Skin is warm. Findings: No rash. Neurological:      Mental Status: He is alert.    Psychiatric:         Mood and Affect: Mood normal.         Behavior: Behavior normal.

## 2023-09-15 ENCOUNTER — APPOINTMENT (EMERGENCY)
Dept: CT IMAGING | Facility: HOSPITAL | Age: 53
End: 2023-09-15
Payer: COMMERCIAL

## 2023-09-15 ENCOUNTER — APPOINTMENT (EMERGENCY)
Dept: RADIOLOGY | Facility: HOSPITAL | Age: 53
End: 2023-09-15
Payer: COMMERCIAL

## 2023-09-15 ENCOUNTER — HOSPITAL ENCOUNTER (EMERGENCY)
Facility: HOSPITAL | Age: 53
Discharge: HOME | End: 2023-09-15
Attending: EMERGENCY MEDICINE
Payer: COMMERCIAL

## 2023-09-15 VITALS
HEART RATE: 75 BPM | WEIGHT: 315 LBS | BODY MASS INDEX: 44.94 KG/M2 | DIASTOLIC BLOOD PRESSURE: 59 MMHG | RESPIRATION RATE: 20 BRPM | SYSTOLIC BLOOD PRESSURE: 115 MMHG | OXYGEN SATURATION: 97 % | TEMPERATURE: 97.4 F

## 2023-09-15 DIAGNOSIS — R07.89 CHEST WALL PAIN: ICD-10-CM

## 2023-09-15 DIAGNOSIS — S22.39XA RIB FRACTURE: Primary | ICD-10-CM

## 2023-09-15 DIAGNOSIS — R07.89 ATYPICAL CHEST PAIN: ICD-10-CM

## 2023-09-15 LAB
2HR DELTA HS TROPONIN: 6 NG/L
ANION GAP SERPL CALCULATED.3IONS-SCNC: 7 MMOL/L
BASOPHILS # BLD AUTO: 0.04 THOUSANDS/ÂΜL (ref 0–0.1)
BASOPHILS NFR BLD AUTO: 1 % (ref 0–1)
BUN SERPL-MCNC: 11 MG/DL (ref 5–25)
CALCIUM SERPL-MCNC: 9 MG/DL (ref 8.4–10.2)
CARDIAC TROPONIN I PNL SERPL HS: 23 NG/L
CARDIAC TROPONIN I PNL SERPL HS: 29 NG/L
CHLORIDE SERPL-SCNC: 101 MMOL/L (ref 96–108)
CO2 SERPL-SCNC: 28 MMOL/L (ref 21–32)
CREAT SERPL-MCNC: 0.75 MG/DL (ref 0.6–1.3)
EOSINOPHIL # BLD AUTO: 0.13 THOUSAND/ÂΜL (ref 0–0.61)
EOSINOPHIL NFR BLD AUTO: 2 % (ref 0–6)
ERYTHROCYTE [DISTWIDTH] IN BLOOD BY AUTOMATED COUNT: 13.3 % (ref 11.6–15.1)
GFR SERPL CREATININE-BSD FRML MDRD: 104 ML/MIN/1.73SQ M
GLUCOSE SERPL-MCNC: 98 MG/DL (ref 65–140)
HCT VFR BLD AUTO: 47.4 % (ref 36.5–49.3)
HGB BLD-MCNC: 15.7 G/DL (ref 12–17)
IMM GRANULOCYTES # BLD AUTO: 0.02 THOUSAND/UL (ref 0–0.2)
IMM GRANULOCYTES NFR BLD AUTO: 0 % (ref 0–2)
LYMPHOCYTES # BLD AUTO: 2.33 THOUSANDS/ÂΜL (ref 0.6–4.47)
LYMPHOCYTES NFR BLD AUTO: 30 % (ref 14–44)
MCH RBC QN AUTO: 30.6 PG (ref 26.8–34.3)
MCHC RBC AUTO-ENTMCNC: 33.1 G/DL (ref 31.4–37.4)
MCV RBC AUTO: 92 FL (ref 82–98)
MONOCYTES # BLD AUTO: 0.82 THOUSAND/ÂΜL (ref 0.17–1.22)
MONOCYTES NFR BLD AUTO: 11 % (ref 4–12)
NEUTROPHILS # BLD AUTO: 4.49 THOUSANDS/ÂΜL (ref 1.85–7.62)
NEUTS SEG NFR BLD AUTO: 56 % (ref 43–75)
NRBC BLD AUTO-RTO: 0 /100 WBCS
PLATELET # BLD AUTO: 249 THOUSANDS/UL (ref 149–390)
PMV BLD AUTO: 11 FL (ref 8.9–12.7)
POTASSIUM SERPL-SCNC: 4.1 MMOL/L (ref 3.5–5.3)
RBC # BLD AUTO: 5.13 MILLION/UL (ref 3.88–5.62)
SODIUM SERPL-SCNC: 136 MMOL/L (ref 135–147)
WBC # BLD AUTO: 7.83 THOUSAND/UL (ref 4.31–10.16)

## 2023-09-15 PROCEDURE — 74174 CTA ABD&PLVS W/CONTRAST: CPT

## 2023-09-15 PROCEDURE — 93005 ELECTROCARDIOGRAM TRACING: CPT

## 2023-09-15 PROCEDURE — 80048 BASIC METABOLIC PNL TOTAL CA: CPT | Performed by: EMERGENCY MEDICINE

## 2023-09-15 PROCEDURE — 99285 EMERGENCY DEPT VISIT HI MDM: CPT

## 2023-09-15 PROCEDURE — 71275 CT ANGIOGRAPHY CHEST: CPT

## 2023-09-15 PROCEDURE — 84484 ASSAY OF TROPONIN QUANT: CPT | Performed by: EMERGENCY MEDICINE

## 2023-09-15 PROCEDURE — 71045 X-RAY EXAM CHEST 1 VIEW: CPT

## 2023-09-15 PROCEDURE — 85025 COMPLETE CBC W/AUTO DIFF WBC: CPT | Performed by: EMERGENCY MEDICINE

## 2023-09-15 PROCEDURE — 36415 COLL VENOUS BLD VENIPUNCTURE: CPT | Performed by: EMERGENCY MEDICINE

## 2023-09-15 PROCEDURE — G1004 CDSM NDSC: HCPCS

## 2023-09-15 RX ORDER — SODIUM CHLORIDE 9 MG/ML
3 INJECTION INTRAVENOUS
Status: DISCONTINUED | OUTPATIENT
Start: 2023-09-15 | End: 2023-09-15 | Stop reason: HOSPADM

## 2023-09-15 RX ORDER — IBUPROFEN 800 MG/1
800 TABLET ORAL 3 TIMES DAILY
Qty: 21 TABLET | Refills: 0 | Status: SHIPPED | OUTPATIENT
Start: 2023-09-15

## 2023-09-15 RX ADMIN — IOHEXOL 99 ML: 350 INJECTION, SOLUTION INTRAVENOUS at 19:33
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
## 2023-09-16 NOTE — DISCHARGE INSTRUCTIONS
Return to the ER immediately for any worsening symptoms. Please follow-up with your PCP for further evaluation and management. Although your visit today did not indicate that there is any acute cardiac cause for your chest pain given that you have 2 risk factors and have not seen a cardiologist who is recommended that you follow-up for screening.

## 2023-09-17 LAB
ATRIAL RATE: 77 BPM
ATRIAL RATE: 92 BPM
P AXIS: 17 DEGREES
P AXIS: 31 DEGREES
PR INTERVAL: 182 MS
PR INTERVAL: 188 MS
QRS AXIS: -25 DEGREES
QRS AXIS: -29 DEGREES
QRSD INTERVAL: 68 MS
QRSD INTERVAL: 94 MS
QT INTERVAL: 324 MS
QT INTERVAL: 360 MS
QTC INTERVAL: 400 MS
QTC INTERVAL: 407 MS
T WAVE AXIS: 19 DEGREES
T WAVE AXIS: 47 DEGREES
VENTRICULAR RATE: 77 BPM
VENTRICULAR RATE: 92 BPM

## 2023-11-02 ENCOUNTER — HOSPITAL ENCOUNTER (EMERGENCY)
Facility: HOSPITAL | Age: 53
Discharge: HOME | End: 2023-11-02
Attending: EMERGENCY MEDICINE | Admitting: EMERGENCY MEDICINE
Payer: COMMERCIAL

## 2023-11-02 ENCOUNTER — APPOINTMENT (EMERGENCY)
Dept: RADIOLOGY | Facility: HOSPITAL | Age: 53
End: 2023-11-02
Payer: COMMERCIAL

## 2023-11-02 VITALS
RESPIRATION RATE: 18 BRPM | HEIGHT: 74 IN | WEIGHT: 315 LBS | BODY MASS INDEX: 40.43 KG/M2 | DIASTOLIC BLOOD PRESSURE: 76 MMHG | OXYGEN SATURATION: 96 % | HEART RATE: 84 BPM | TEMPERATURE: 97.4 F | SYSTOLIC BLOOD PRESSURE: 108 MMHG

## 2023-11-02 DIAGNOSIS — S80.211A ABRASION OF RIGHT KNEE, INITIAL ENCOUNTER: Primary | ICD-10-CM

## 2023-11-02 DIAGNOSIS — S80.01XA CONTUSION OF RIGHT KNEE, INITIAL ENCOUNTER: ICD-10-CM

## 2023-11-02 PROCEDURE — 99284 EMERGENCY DEPT VISIT MOD MDM: CPT | Performed by: EMERGENCY MEDICINE

## 2023-11-02 PROCEDURE — 73564 X-RAY EXAM KNEE 4 OR MORE: CPT

## 2023-11-02 PROCEDURE — 73030 X-RAY EXAM OF SHOULDER: CPT

## 2023-11-02 PROCEDURE — 99284 EMERGENCY DEPT VISIT MOD MDM: CPT
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
## 2024-01-20 ENCOUNTER — APPOINTMENT (EMERGENCY)
Dept: RADIOLOGY | Facility: HOSPITAL | Age: 54
End: 2024-01-20
Payer: COMMERCIAL

## 2024-01-20 ENCOUNTER — HOSPITAL ENCOUNTER (EMERGENCY)
Facility: HOSPITAL | Age: 54
Discharge: HOME | End: 2024-01-20
Attending: EMERGENCY MEDICINE
Payer: COMMERCIAL

## 2024-01-20 VITALS
RESPIRATION RATE: 18 BRPM | HEART RATE: 95 BPM | DIASTOLIC BLOOD PRESSURE: 70 MMHG | TEMPERATURE: 97.7 F | SYSTOLIC BLOOD PRESSURE: 118 MMHG | HEIGHT: 74 IN | WEIGHT: 315 LBS | BODY MASS INDEX: 40.43 KG/M2 | OXYGEN SATURATION: 98 %

## 2024-01-20 DIAGNOSIS — M79.672 LEFT FOOT PAIN: Primary | ICD-10-CM

## 2024-01-20 DIAGNOSIS — M10.9 GOUT: ICD-10-CM

## 2024-01-20 PROCEDURE — 73630 X-RAY EXAM OF FOOT: CPT

## 2024-01-20 PROCEDURE — 99284 EMERGENCY DEPT VISIT MOD MDM: CPT | Performed by: EMERGENCY MEDICINE

## 2024-01-20 PROCEDURE — 99283 EMERGENCY DEPT VISIT LOW MDM: CPT

## 2024-01-20 RX ORDER — PREDNISONE 20 MG/1
TABLET ORAL DAILY
Qty: 20 TABLET | Refills: 0 | Status: SHIPPED | OUTPATIENT
Start: 2024-01-20 | End: 2024-01-31

## 2024-01-20 RX ORDER — PREDNISONE 20 MG/1
40 TABLET ORAL ONCE
Status: COMPLETED | OUTPATIENT
Start: 2024-01-20 | End: 2024-01-20

## 2024-01-20 RX ADMIN — PREDNISONE 40 MG: 20 TABLET ORAL at 17:27

## 2024-01-20 NOTE — ED PROVIDER NOTES
History  Chief Complaint   Patient presents with    Foot Pain     Pt reports pain to left foot x 2 weeks getting gradually worse and is now unable to bear weight on foot.  Pt denies injury to area.      Patient is a 53-year-old male presenting to the emergency department complaining of left foot pain that is been bothering him for the past few weeks, he denies any specific injury, he reports a history of gout and also has had cellulitis before, he does get occasional pain in his left posterior thigh which shoots down the leg into the left ankle at times, he denies any numbness or tingling, no open wounds, he takes colchicine on a daily basis        Prior to Admission Medications   Prescriptions Last Dose Informant Patient Reported? Taking?   Multiple Vitamins-Minerals (multivitamin with minerals) tablet   Yes No   Sig: Take 1 tablet by mouth daily   al mag oxide-diphenhydramine-lidocaine viscous (MAGIC MOUTHWASH) 1:1:1 suspension   No No   Sig: Swish and spit 10 mL every 4 (four) hours as needed for mouth pain or discomfort   Patient not taking: Reported on 1/26/2022   allopurinol (ZYLOPRIM) 300 mg tablet   Yes No   Patient not taking: Reported on 7/14/2023   amLODIPine (NORVASC) 5 mg tablet   No No   Sig: Take 1 tablet (5 mg total) by mouth daily   ascorbic acid (VITAMIN C) 250 mg tablet   Yes No   Sig: Take 250 mg by mouth daily   Patient not taking: Reported on 4/24/2023   benzonatate (TESSALON) 200 MG capsule   No No   Sig: Take 1 capsule (200 mg total) by mouth 3 (three) times a day as needed for cough   Patient not taking: Reported on 8/2/2023   cephalexin (KEFLEX) 500 mg capsule   Yes No   Sig: Take 500 mg by mouth every 8 (eight) hours   Patient not taking: Reported on 4/24/2023   colchicine (COLCRYS) 0.6 mg tablet   No No   Sig: TAKE (1) TABLET DAILY.   cyclobenzaprine (FLEXERIL) 10 mg tablet   No No   Sig: Take 1 tablet (10 mg total) by mouth daily at bedtime   enalapril (VASOTEC) 20 mg tablet   No No    Sig: Take 1 tablet (20 mg total) by mouth daily   ibuprofen (MOTRIN) 800 mg tablet   No No   Sig: Take 1 tablet (800 mg total) by mouth 3 (three) times a day   indomethacin (INDOCIN) 50 mg capsule   No No   Sig: Take 1 capsule (50 mg total) by mouth 3 (three) times a day with meals   Patient not taking: Reported on 1/26/2022   methylPREDNISolone 4 MG tablet therapy pack   No No   Sig: Use as directed on package   montelukast (SINGULAIR) 10 mg tablet   Yes No   Sig: Take 10 mg by mouth daily at bedtime   Patient not taking: Reported on 7/14/2023      Facility-Administered Medications: None       Past Medical History:   Diagnosis Date    Arthritis     Cellulitis     Gout     Hypertension        Past Surgical History:   Procedure Laterality Date    TONSILLECTOMY         Family History   Problem Relation Age of Onset    Diabetes Mother      I have reviewed and agree with the history as documented.    E-Cigarette/Vaping    E-Cigarette Use Never User      E-Cigarette/Vaping Substances     Social History     Tobacco Use    Smoking status: Former     Types: Cigarettes    Smokeless tobacco: Never    Tobacco comments:     Never a smoker as per [employer]   Vaping Use    Vaping status: Never Used   Substance Use Topics    Alcohol use: Yes     Comment: occasional    Drug use: No       Review of Systems   Constitutional: Negative.    HENT: Negative.     Eyes: Negative.    Respiratory: Negative.     Cardiovascular: Negative.    Gastrointestinal: Negative.    Endocrine: Negative.    Genitourinary: Negative.    Musculoskeletal:  Positive for arthralgias and joint swelling.   Skin:  Positive for color change.   Allergic/Immunologic: Negative.    Neurological: Negative.    Hematological: Negative.    Psychiatric/Behavioral: Negative.         Physical Exam  Physical Exam  Constitutional:       Appearance: He is well-developed.   HENT:      Head: Normocephalic and atraumatic.   Eyes:      Conjunctiva/sclera: Conjunctivae normal.       Pupils: Pupils are equal, round, and reactive to light.   Cardiovascular:      Rate and Rhythm: Normal rate.   Pulmonary:      Effort: Pulmonary effort is normal.   Abdominal:      Palpations: Abdomen is soft.   Musculoskeletal:         General: Normal range of motion.      Cervical back: Normal range of motion and neck supple.        Feet:    Feet:      Comments: Erythema and swelling with slight tenderness to the left great toe at the MTP and distal, no open wounds, distal sensation and motor is intact with brisk capillary refill  Skin:     General: Skin is warm and dry.   Neurological:      Mental Status: He is alert and oriented to person, place, and time.         Vital Signs  ED Triage Vitals [01/20/24 1627]   Temperature Pulse Respirations Blood Pressure SpO2   97.7 °F (36.5 °C) 95 18 118/70 98 %      Temp Source Heart Rate Source Patient Position - Orthostatic VS BP Location FiO2 (%)   Oral Monitor -- -- --      Pain Score       10 - Worst Possible Pain           Vitals:    01/20/24 1627   BP: 118/70   Pulse: 95         Visual Acuity      ED Medications  Medications   predniSONE tablet 40 mg (has no administration in time range)       Diagnostic Studies  Results Reviewed       None                   XR foot 3+ views LEFT   ED Interpretation by Emeli Lopez DO (01/20 1724)   No acute findings, findings consistent with gout of the first MTP                 Procedures  Procedures         ED Course  ED Course as of 01/20/24 1726   Sat Jan 20, 2024 1725 Patient works as a , he declined opioid/narcotic pain medication but states that prednisone has been helpful in the past                                             Medical Decision Making   Patient presents with left great toe redness and pain.  Given history, exam and work-up, patient likely has gout flareup.  I have low suspicion for fracture, dislocation, significant ligamentous injury, septic arthritis, new autoimmune arthropathy or  gonococcal arthropathy.      Problems Addressed:  Gout: acute illness or injury  Left foot pain: acute illness or injury    Amount and/or Complexity of Data Reviewed  Radiology: ordered and independent interpretation performed. Decision-making details documented in ED Course.    Risk  OTC drugs.  Prescription drug management.             Disposition  Final diagnoses:   Left foot pain   Gout     Time reflects when diagnosis was documented in both MDM as applicable and the Disposition within this note       Time User Action Codes Description Comment    1/20/2024  5:21 PM Emeli Lopez [M79.672] Left foot pain     1/20/2024  5:21 PM Emeli Lopez [M10.9] Gout           ED Disposition       ED Disposition   Discharge    Condition   Stable    Date/Time   Sat Jan 20, 2024  5:21 PM    Comment   Ronak Lisa discharge to home/self care.                   Follow-up Information       Follow up With Specialties Details Why Contact Info    Mikayla Best DPM Podiatry, Wound Care, General Surgery In 1 week As needed [address]  [place] [id number] [phone]              Patient's Medications   Discharge Prescriptions    PREDNISONE 20 MG TABLET    Take 3 tablets (60 mg total) by mouth daily for 3 days, THEN 2 tablets (40 mg total) daily for 3 days, THEN 1 tablet (20 mg total) daily for 3 days, THEN 0.5 tablets (10 mg total) daily for 3 days.       Start Date: 1/20/2024 End Date: 1/31/2024       Order Dose: --       Quantity: 20 tablet    Refills: 0           PDMP Review       None            ED Provider  Electronically Signed by             Emeli Lopez DO  01/20/24 5666

## 2024-02-03 ENCOUNTER — HOSPITAL ENCOUNTER (OUTPATIENT)
Dept: ULTRASOUND IMAGING | Facility: HOSPITAL | Age: 54
Discharge: HOME | End: 2024-02-03
Payer: COMMERCIAL

## 2024-02-03 DIAGNOSIS — K76.0 FATTY (CHANGE OF) LIVER, NOT ELSEWHERE CLASSIFIED: ICD-10-CM

## 2024-02-03 PROCEDURE — 76705 ECHO EXAM OF ABDOMEN: CPT

## 2024-04-16 ENCOUNTER — OFFICE VISIT (OUTPATIENT)
Dept: URGENT CARE | Facility: CLINIC | Age: 54
End: 2024-04-16
Payer: COMMERCIAL

## 2024-04-16 ENCOUNTER — APPOINTMENT (OUTPATIENT)
Dept: RADIOLOGY | Facility: CLINIC | Age: 54
End: 2024-04-16
Payer: COMMERCIAL

## 2024-04-16 VITALS
SYSTOLIC BLOOD PRESSURE: 122 MMHG | TEMPERATURE: 98.1 F | OXYGEN SATURATION: 96 % | RESPIRATION RATE: 16 BRPM | HEART RATE: 75 BPM | DIASTOLIC BLOOD PRESSURE: 73 MMHG | WEIGHT: 315 LBS | HEIGHT: 73 IN | BODY MASS INDEX: 41.75 KG/M2

## 2024-04-16 DIAGNOSIS — M25.441 SWELLING OF HAND JOINT, RIGHT: Primary | ICD-10-CM

## 2024-04-16 PROCEDURE — 73130 X-RAY EXAM OF HAND: CPT

## 2024-04-16 PROCEDURE — 99213 OFFICE O/P EST LOW 20 MIN: CPT

## 2024-04-16 RX ORDER — PREDNISONE 20 MG/1
40 TABLET ORAL DAILY
Qty: 10 TABLET | Refills: 0 | Status: SHIPPED | OUTPATIENT
Start: 2024-04-16 | End: 2024-04-21

## 2024-04-16 RX ORDER — ASPIRIN 81 MG/1
81 TABLET, CHEWABLE ORAL DAILY
COMMUNITY
Start: 2024-01-30

## 2024-04-16 RX ORDER — LOSARTAN POTASSIUM 50 MG/1
50 TABLET ORAL DAILY
COMMUNITY
Start: 2024-01-30

## 2024-04-16 RX ORDER — CEPHALEXIN 500 MG/1
500 CAPSULE ORAL EVERY 6 HOURS SCHEDULED
Qty: 28 CAPSULE | Refills: 0 | Status: SHIPPED | OUTPATIENT
Start: 2024-04-16 | End: 2024-04-23

## 2024-04-16 RX ORDER — FEBUXOSTAT 40 MG/1
40 TABLET, FILM COATED ORAL DAILY
COMMUNITY
Start: 2024-01-30

## 2024-04-16 NOTE — LETTER
April 16, 2024     Patient: Ronak Lisa   YOB: 1970   Date of Visit: 4/16/2024       To Whom it May Concern:    Ronak Lisa was seen in my clinic on 4/16/2024. He may return to work on 04/19 .    If you have any questions or concerns, please don't hesitate to call.         Sincerely,          Ashvin Gee PA-C        CC: No Recipients

## 2024-04-16 NOTE — PROGRESS NOTES
[facility]        NAME: Ronak Lisa is a 53 y.o. male  : 1970    MRN: [id number]  DATE: 2024  TIME: 6:23 PM    Assessment and Plan   Swelling of hand joint, right [M25.441]  1. Swelling of hand joint, right  XR hand 3+ vw right    cephalexin (KEFLEX) 500 mg capsule    predniSONE 20 mg tablet    Ambulatory Referral to Orthopedic Surgery        Discussed problem with patient.  X-rays reveal no acute abnormalities but awaiting official radiology interpretation.  Advised higher level of care due to sudden onset of swelling complaints and patient is opting not to.  Wants to trial antibiotic management for cellulitis which I think is reasonable.  Will also prescribe prednisone for this issue for inflammatory component placed referral to hand surgery for follow-up.  Discussed strict red flag symptoms and to report to the ER if experiencing    Patient Instructions       Follow up with PCP in 3-5 days.  Proceed to  ER if symptoms worsen.    If tests are performed, our office will contact you with results only if changes need to made to the care plan discussed with you at the visit. You can review your full results on [facility].    Chief Complaint     Chief Complaint   Patient presents with    Hand Swelling     Right hand pain and swelling starting yesterday in the knuckle today progressed to finger and then to hand. Barely able to move fingers due to swelling. Denies any injuries to the area. No breaks or openings in the skin.          History of Present Illness       Right hand pain and swelling starting yesterday in the knuckle today progressed to finger and then to hand. Barely able to move fingers due to swelling. Denies any injuries to the area. No breaks or openings in the skin. Pmh of gout, never got in joints. States this is not gout.  Concern for cellulitis.  Denies any fevers, chills, body aches.  Has not not been doing anything for symptoms        Review of Systems  Discharge instructions and meds discussed with pt     Review of Systems   Constitutional:  Negative for appetite change, chills, fatigue and fever.   Respiratory:  Negative for cough, shortness of breath, wheezing and stridor.    Cardiovascular:  Negative for chest pain and palpitations.   Musculoskeletal:  Positive for joint swelling.   Skin:         Right middle finger swelling that moves up hand   Neurological:  Negative for dizziness, syncope, light-headedness and headaches.         Current Medications       Current Outpatient Medications:     amLODIPine (NORVASC) 5 mg tablet, Take 1 tablet (5 mg total) by mouth daily, Disp: 30 tablet, Rfl: 0    aspirin 81 mg chewable tablet, Chew 81 mg daily, Disp: , Rfl:     cephalexin (KEFLEX) 500 mg capsule, Take 1 capsule (500 mg total) by mouth every 6 (six) hours for 7 days, Disp: 28 capsule, Rfl: 0    colchicine (COLCRYS) 0.6 mg tablet, TAKE (1) TABLET DAILY., Disp: 30 tablet, Rfl: 0    cyclobenzaprine (FLEXERIL) 10 mg tablet, Take 1 tablet (10 mg total) by mouth daily at bedtime, Disp: 30 tablet, Rfl: 0    febuxostat (ULORIC) 40 mg tablet, Take 40 mg by mouth daily, Disp: , Rfl:     ibuprofen (MOTRIN) 800 mg tablet, Take 1 tablet (800 mg total) by mouth 3 (three) times a day, Disp: 21 tablet, Rfl: 0    losartan (COZAAR) 50 mg tablet, Take 50 mg by mouth daily, Disp: , Rfl:     Multiple Vitamins-Minerals (multivitamin with minerals) tablet, Take 1 tablet by mouth daily, Disp: , Rfl:     predniSONE 20 mg tablet, Take 2 tablets (40 mg total) by mouth daily for 5 days, Disp: 10 tablet, Rfl: 0    al mag oxide-diphenhydramine-lidocaine viscous (MAGIC MOUTHWASH) 1:1:1 suspension, Swish and spit 10 mL every 4 (four) hours as needed for mouth pain or discomfort (Patient not taking: Reported on 1/26/2022), Disp: 90 mL, Rfl: 0    allopurinol (ZYLOPRIM) 300 mg tablet, , Disp: , Rfl:     ascorbic acid (VITAMIN C) 250 mg tablet, Take 250 mg by mouth daily (Patient not taking: Reported on 4/24/2023), Disp: , Rfl:     benzonatate  "(TESSALON) 200 MG capsule, Take 1 capsule (200 mg total) by mouth 3 (three) times a day as needed for cough (Patient not taking: Reported on 8/2/2023), Disp: 20 capsule, Rfl: 0    enalapril (VASOTEC) 20 mg tablet, Take 1 tablet (20 mg total) by mouth daily (Patient not taking: Reported on 4/16/2024), Disp: 30 tablet, Rfl: 0    indomethacin (INDOCIN) 50 mg capsule, Take 1 capsule (50 mg total) by mouth 3 (three) times a day with meals (Patient not taking: Reported on 1/26/2022), Disp: 21 capsule, Rfl: 0    methylPREDNISolone 4 MG tablet therapy pack, Use as directed on package (Patient not taking: Reported on 4/16/2024), Disp: 1 each, Rfl: 0    montelukast (SINGULAIR) 10 mg tablet, Take 10 mg by mouth daily at bedtime (Patient not taking: Reported on 7/14/2023), Disp: , Rfl:     Current Allergies     Allergies as of 04/16/2024    (No Known Allergies)            The following portions of the patient's history were reviewed and updated as appropriate: allergies, current medications, past family history, past medical history, past social history, past surgical history and problem list.     Past Medical History:   Diagnosis Date    Arthritis     Cellulitis     Gout     Hypertension        Past Surgical History:   Procedure Laterality Date    TONSILLECTOMY         Family History   Problem Relation Age of Onset    Diabetes Mother          Medications have been verified.        Objective   /73   Pulse 75   Temp 98.1 °F (36.7 °C)   Resp 16   Ht 6' 1\" (1.854 m)   Wt (!) 152 kg (335 lb 3.2 oz)   SpO2 96%   BMI 44.22 kg/m²        Physical Exam     Physical Exam  Vitals and nursing note reviewed.   Constitutional:       General: He is not in acute distress.     Appearance: Normal appearance. He is normal weight. He is not ill-appearing, toxic-appearing or diaphoretic.   Cardiovascular:      Rate and Rhythm: Normal rate and regular rhythm.      Pulses: Normal pulses.      Heart sounds: Normal heart sounds. No murmur " heard.     No friction rub. No gallop.   Pulmonary:      Effort: Pulmonary effort is normal. No respiratory distress.      Breath sounds: Normal breath sounds. No stridor. No wheezing, rhonchi or rales.   Chest:      Chest wall: No tenderness.   Musculoskeletal:      Right hand: Swelling and tenderness present. No deformity, lacerations or bony tenderness. Decreased range of motion. Normal strength. Normal sensation. There is no disruption of two-point discrimination. Normal capillary refill. Normal pulse.      Comments: Moderate swelling to patient's right middle finger.  Swelling extends up into his hand.  Generalized tenderness to his finger.  -2 capillary refill.  Denies any numbness or tingling to extremity.  Decreased range of motion but most likely due to swelling.  No pain with range of motion   Neurological:      Mental Status: He is alert.

## 2024-04-30 ENCOUNTER — HOSPITAL ENCOUNTER (EMERGENCY)
Facility: HOSPITAL | Age: 54
Discharge: HOME | End: 2024-04-30
Attending: EMERGENCY MEDICINE
Payer: COMMERCIAL

## 2024-04-30 VITALS
OXYGEN SATURATION: 97 % | DIASTOLIC BLOOD PRESSURE: 84 MMHG | RESPIRATION RATE: 18 BRPM | HEART RATE: 81 BPM | TEMPERATURE: 97.9 F | SYSTOLIC BLOOD PRESSURE: 129 MMHG

## 2024-04-30 DIAGNOSIS — M25.522 LEFT ELBOW PAIN: Primary | ICD-10-CM

## 2024-04-30 DIAGNOSIS — M71.9 BURSITIS: ICD-10-CM

## 2024-04-30 PROCEDURE — 96372 THER/PROPH/DIAG INJ SC/IM: CPT

## 2024-04-30 PROCEDURE — 99284 EMERGENCY DEPT VISIT MOD MDM: CPT | Performed by: PHYSICIAN ASSISTANT

## 2024-04-30 PROCEDURE — 99283 EMERGENCY DEPT VISIT LOW MDM: CPT

## 2024-04-30 RX ORDER — DEXAMETHASONE SODIUM PHOSPHATE 10 MG/ML
10 INJECTION, SOLUTION INTRAMUSCULAR; INTRAVENOUS ONCE
Status: COMPLETED | OUTPATIENT
Start: 2024-04-30 | End: 2024-04-30

## 2024-04-30 RX ORDER — PREDNISONE 20 MG/1
40 TABLET ORAL DAILY
Qty: 10 TABLET | Refills: 0 | Status: SHIPPED | OUTPATIENT
Start: 2024-04-30 | End: 2024-05-05

## 2024-04-30 RX ORDER — CEPHALEXIN 500 MG/1
500 CAPSULE ORAL EVERY 6 HOURS SCHEDULED
Qty: 28 CAPSULE | Refills: 0 | Status: SHIPPED | OUTPATIENT
Start: 2024-04-30 | End: 2024-05-07

## 2024-04-30 RX ADMIN — DEXAMETHASONE SODIUM PHOSPHATE 10 MG: 10 INJECTION, SOLUTION INTRAMUSCULAR; INTRAVENOUS at 15:22

## 2024-04-30 NOTE — ED PROVIDER NOTES
History  Chief Complaint   Patient presents with    Elbow Pain     Pt presents to ED with left elbow pain and swelling. Denies injury. Went to urgent care 2 weeks ago and started on prednisone. Hx gout. States it does not feel like gout.      Patient is a 53-year-old male who presents with recurrent left elbow swelling and pain.  The patient states that he is been having pain over his left elbow for quite some time but the swelling has not really improved.  States that while he was on prednisone the pain improved.  He has never had gout in his upper extremities before but did have gout in his lower extremities.  Patient stopped prednisone recently.  Did not see any orthopedic specialist yet.  Patient is afebrile but was concerned about how it was swollen and tender.      Elbow Pain  Location:  Elbow  Elbow location:  L elbow  Injury: no    Pain details:     Quality:  Pressure    Duration:  2 weeks    Timing:  Constant    Progression:  Waxing and waning  Handedness:  Right-handed  Foreign body present:  No foreign bodies  Tetanus status:  Unknown  Prior injury to area:  No  Relieved by:  Nothing  Worsened by:  Nothing  Ineffective treatments:  NSAIDs and rest  Associated symptoms: no back pain and no decreased range of motion        Prior to Admission Medications   Prescriptions Last Dose Informant Patient Reported? Taking?   Multiple Vitamins-Minerals (multivitamin with minerals) tablet   Yes No   Sig: Take 1 tablet by mouth daily   al mag oxide-diphenhydramine-lidocaine viscous (MAGIC MOUTHWASH) 1:1:1 suspension   No No   Sig: Swish and spit 10 mL every 4 (four) hours as needed for mouth pain or discomfort   Patient not taking: Reported on 1/26/2022   allopurinol (ZYLOPRIM) 300 mg tablet   Yes No   Patient not taking: Reported on 7/14/2023   amLODIPine (NORVASC) 5 mg tablet   No No   Sig: Take 1 tablet (5 mg total) by mouth daily   ascorbic acid (VITAMIN C) 250 mg tablet   Yes No   Sig: Take 250 mg by mouth daily    Patient not taking: Reported on 4/24/2023   aspirin 81 mg chewable tablet   Yes No   Sig: Chew 81 mg daily   benzonatate (TESSALON) 200 MG capsule   No No   Sig: Take 1 capsule (200 mg total) by mouth 3 (three) times a day as needed for cough   Patient not taking: Reported on 8/2/2023   colchicine (COLCRYS) 0.6 mg tablet   No No   Sig: TAKE (1) TABLET DAILY.   cyclobenzaprine (FLEXERIL) 10 mg tablet   No No   Sig: Take 1 tablet (10 mg total) by mouth daily at bedtime   enalapril (VASOTEC) 20 mg tablet   No No   Sig: Take 1 tablet (20 mg total) by mouth daily   Patient not taking: Reported on 4/16/2024   febuxostat (ULORIC) 40 mg tablet   Yes No   Sig: Take 40 mg by mouth daily   ibuprofen (MOTRIN) 800 mg tablet   No No   Sig: Take 1 tablet (800 mg total) by mouth 3 (three) times a day   indomethacin (INDOCIN) 50 mg capsule   No No   Sig: Take 1 capsule (50 mg total) by mouth 3 (three) times a day with meals   Patient not taking: Reported on 1/26/2022   losartan (COZAAR) 50 mg tablet   Yes No   Sig: Take 50 mg by mouth daily   methylPREDNISolone 4 MG tablet therapy pack   No No   Sig: Use as directed on package   Patient not taking: Reported on 4/16/2024   montelukast (SINGULAIR) 10 mg tablet   Yes No   Sig: Take 10 mg by mouth daily at bedtime   Patient not taking: Reported on 7/14/2023      Facility-Administered Medications: None       Past Medical History:   Diagnosis Date    Arthritis     Cellulitis     Gout     Hypertension        Past Surgical History:   Procedure Laterality Date    TONSILLECTOMY         Family History   Problem Relation Age of Onset    Diabetes Mother      I have reviewed and agree with the history as documented.    E-Cigarette/Vaping    E-Cigarette Use Never User      E-Cigarette/Vaping Substances     Social History     Tobacco Use    Smoking status: Former     Types: Cigarettes    Smokeless tobacco: Never    Tobacco comments:     Never a smoker as per [employer]   Vaping Use    Vaping  status: Never Used   Substance Use Topics    Alcohol use: Yes     Comment: occasional    Drug use: No       Review of Systems   Musculoskeletal:  Negative for back pain.   All other systems reviewed and are negative.      Physical Exam  Physical Exam  Vitals and nursing note reviewed.   Constitutional:       General: He is not in acute distress.     Appearance: He is well-developed.   HENT:      Head: Normocephalic and atraumatic.   Eyes:      Pupils: Pupils are equal, round, and reactive to light.   Cardiovascular:      Rate and Rhythm: Normal rate.   Musculoskeletal:      Left elbow: Swelling present.      Left wrist: Normal.      Cervical back: Normal range of motion.      Comments: Patient has bursitis on the left elbow examination.  Area is swollen, warm, soft.  Patient has no open wounds or cellulitic changes.   Skin:     General: Skin is warm and dry.      Capillary Refill: Capillary refill takes less than 2 seconds.   Neurological:      Mental Status: He is alert and oriented to person, place, and time.   Psychiatric:         Behavior: Behavior normal.         Vital Signs  ED Triage Vitals [04/30/24 1449]   Temperature Pulse Respirations Blood Pressure SpO2   97.9 °F (36.6 °C) 81 18 129/84 97 %      Temp Source Heart Rate Source Patient Position - Orthostatic VS BP Location FiO2 (%)   Temporal Monitor Sitting Right arm --      Pain Score       --           Vitals:    04/30/24 1449 04/30/24 1500   BP: 129/84 129/84   Pulse: 81    Patient Position - Orthostatic VS: Sitting          Visual Acuity      ED Medications  Medications   dexamethasone (PF) (DECADRON) injection 10 mg (10 mg Intramuscular Given 4/30/24 1522)       Diagnostic Studies  Results Reviewed       None                   No orders to display              Procedures  Procedures         ED Course                               SBIRT 20yo+      Flowsheet Row Most Recent Value   Initial Alcohol Screen: US AUDIT-C     1. How often do you have a drink  containing alcohol? 0 Filed at: 04/30/2024 1451   2. How many drinks containing alcohol do you have on a typical day you are drinking?  0 Filed at: 04/30/2024 1451   3a. Male UNDER 65: How often do you have five or more drinks on one occasion? 0 Filed at: 04/30/2024 1451   Audit-C Score 0 Filed at: 04/30/2024 1451   LOIDA: How many times in the past year have you...    Used an illegal drug or used a prescription medication for non-medical reasons? Never Filed at: 04/30/2024 1451                      Medical Decision Making  Patient is a 53-year-old male who presents with recurrent left elbow swelling and pain.  The patient states that he is been having pain over his left elbow for quite some time but the swelling has not really improved.  States that while he was on prednisone the pain improved.  He has never had gout in his upper extremities before but did have gout in his lower extremities.  Patient stopped prednisone recently.  Did not see any orthopedic specialist yet.  Patient is afebrile but was concerned about how it was swollen and tender.    Patient has bursitis on the left elbow examination.  Area is swollen, warm, soft.  Patient has no open wounds or cellulitic changes.    X-ray was previously performed which was unremarkable.  Patient was educated on follow-up with orthopedics.  Given prednisone.  Did prophylactically give Keflex   And referred to orthopedics.      Amount and/or Complexity of Data Reviewed  External Data Reviewed: labs, radiology and notes.    Risk  Prescription drug management.             Disposition  Final diagnoses:   Left elbow pain   Bursitis     Time reflects when diagnosis was documented in both MDM as applicable and the Disposition within this note       Time User Action Codes Description Comment    4/30/2024  3:14 PM Ian Bains [M25.522] Left elbow pain     4/30/2024  3:14 PM Ian Bains [M71.9] Bursitis           ED Disposition       ED Disposition   Discharge     Condition   Stable    Date/Time   Tue Apr 30, 2024 1514    Comment   Ronak Lisa discharge to home/self care.                   Follow-up Information       Follow up With Specialties Details Why Contact Info    Sergio Julio MD Orthopedic Surgery, Sports Medicine Call   [address] [facility]  [address]  [facility] [address] [phone]              Discharge Medication List as of 4/30/2024  3:16 PM        START taking these medications    Details   cephalexin (KEFLEX) 500 mg capsule Take 1 capsule (500 mg total) by mouth every 6 (six) hours for 7 days, Starting Tue 4/30/2024, Until Tue 5/7/2024, Normal      predniSONE 20 mg tablet Take 2 tablets (40 mg total) by mouth daily for 5 days, Starting Tue 4/30/2024, Until Sun 5/5/2024, Normal           CONTINUE these medications which have NOT CHANGED    Details   al mag oxide-diphenhydramine-lidocaine viscous (MAGIC MOUTHWASH) 1:1:1 suspension Swish and spit 10 mL every 4 (four) hours as needed for mouth pain or discomfort, Starting Thu 7/1/2021, Normal      allopurinol (ZYLOPRIM) 300 mg tablet Starting Mon 2/20/2023, Historical Med      amLODIPine (NORVASC) 5 mg tablet Take 1 tablet (5 mg total) by mouth daily, Starting Fri 7/9/2021, Normal      ascorbic acid (VITAMIN C) 250 mg tablet Take 250 mg by mouth daily, Historical Med      aspirin 81 mg chewable tablet Chew 81 mg daily, Starting Tue 1/30/2024, Historical Med      benzonatate (TESSALON) 200 MG capsule Take 1 capsule (200 mg total) by mouth 3 (three) times a day as needed for cough, Starting Fri 7/14/2023, Normal      colchicine (COLCRYS) 0.6 mg tablet TAKE (1) TABLET DAILY., Normal      cyclobenzaprine (FLEXERIL) 10 mg tablet Take 1 tablet (10 mg total) by mouth daily at bedtime, Starting Fri 6/4/2021, Normal      enalapril (VASOTEC) 20 mg tablet Take 1 tablet (20 mg total) by mouth daily, Starting Mon 7/12/2021, Normal      febuxostat (ULORIC) 40 mg tablet Take 40 mg by mouth daily, Starting Tue  1/30/2024, Historical Med      ibuprofen (MOTRIN) 800 mg tablet Take 1 tablet (800 mg total) by mouth 3 (three) times a day, Starting Fri 9/15/2023, Normal      indomethacin (INDOCIN) 50 mg capsule Take 1 capsule (50 mg total) by mouth 3 (three) times a day with meals, Starting Sun 1/16/2022, Normal      losartan (COZAAR) 50 mg tablet Take 50 mg by mouth daily, Starting Tue 1/30/2024, Historical Med      methylPREDNISolone 4 MG tablet therapy pack Use as directed on package, Normal      montelukast (SINGULAIR) 10 mg tablet Take 10 mg by mouth daily at bedtime, Historical Med      Multiple Vitamins-Minerals (multivitamin with minerals) tablet Take 1 tablet by mouth daily, Historical Med                 PDMP Review       None            ED Provider  Electronically Signed by             Ian Bains PA-C  04/30/24 2405

## 2024-05-06 RX ORDER — NAPROXEN 375 MG/1
TABLET, DELAYED RELEASE ORAL
COMMUNITY

## 2024-05-07 ENCOUNTER — OFFICE VISIT (OUTPATIENT)
Dept: OBGYN CLINIC | Facility: CLINIC | Age: 54
End: 2024-05-07
Payer: COMMERCIAL

## 2024-05-07 VITALS
SYSTOLIC BLOOD PRESSURE: 110 MMHG | HEART RATE: 62 BPM | OXYGEN SATURATION: 96 % | WEIGHT: 315 LBS | TEMPERATURE: 97.7 F | HEIGHT: 73 IN | DIASTOLIC BLOOD PRESSURE: 75 MMHG | BODY MASS INDEX: 41.75 KG/M2

## 2024-05-07 DIAGNOSIS — M70.22 OLECRANON BURSITIS OF LEFT ELBOW: ICD-10-CM

## 2024-05-07 DIAGNOSIS — M67.441 GANGLION CYST OF FINGER OF RIGHT HAND: ICD-10-CM

## 2024-05-07 DIAGNOSIS — M25.522 LEFT ELBOW PAIN: Primary | ICD-10-CM

## 2024-05-07 DIAGNOSIS — M77.8 TENDINITIS OF LEFT TRICEPS: ICD-10-CM

## 2024-05-07 PROCEDURE — 99204 OFFICE O/P NEW MOD 45 MIN: CPT | Performed by: STUDENT IN AN ORGANIZED HEALTH CARE EDUCATION/TRAINING PROGRAM

## 2024-05-08 NOTE — PROGRESS NOTES
"1. Tendinitis of left triceps        2. Left elbow pain  Ambulatory referral to Orthopedic Surgery      3. Olecranon bursitis of left elbow  Ambulatory referral to Orthopedic Surgery      4. Ganglion cyst of finger of right hand      Right middle        No orders of the defined types were placed in this encounter.       Imaging Studies (I personally reviewed images in PACS and report):    X-ray right hand 4/16/2024: No acute osseous abnormalities.  Flexion at the PIP joint.  No significant degenerative changes.  Unremarkable soft tissues.  No evidence of bony erosion.  There is a prior radiograph from [facility] of his left elbow on 7/13/2017 with the impression noting \"soft tissue swelling possibly olecranon bursitis, without underlying left elbow fracture effusion.\"    IMPRESSION:  Acute atraumatic left olecranon elbow to pain-reportedly previously having swelling and was seen by the ER and diagnosed with bursitis/cellulitis.  Currently on antibiotic regimen.  Reportedly taking prednisone sporadically for pain with relief.  Suspected trochanteric bursitis versus insertional triceps tendinitis.  No evidence of bursitis on exam today and no significant amount of effusion to aspirate.  (Differential: Gout flare)  Separate issue: Prior to left elbow pain he was dealing with acute right middle finger pain and swelling/redness.  Diagnosis cellulitis and was previously on a prednisone taper along with antibiotics as well.  Pain/redness and swelling improved from his initial treatment but now has what appears to be a ganglion cyst on the dorsal aspect of his PIP joint      Other factors:  Right-hand-dominant  BMI 42  Gout  History of olecranon bursitis in the past  History of cellulitis    PLAN:    Clinical exam and radiographic imaging reviewed with patient today, with impression as per above. I have discussed with the patient the pathophysiology of this diagnosis and reviewed how the examination correlates with this diagnosis. " "   Imaging obtained/reviewed as per above. I will follow up official radiology interpretation.  In regards to his left elbow pain, I counseled that there is not a appreciable amount on exam of bursitis to aspirate today.  Additionally I would advise against any aspiration attempts given he is currently still on antibiotics and I recommended he complete his Keflex regimen first.  Counseled he can continue use of acetaminophen, NSAIDs, heat/ice therapy as well as compression wrapping of his elbow.  In regards to his right middle digit, I counseled that symptoms due to appear to be consistent with a ganglion cyst.  Treatment options were discussed including an attempted aspiration but I did  that there is a 50% chance of reaccumulation of the cyst.  Additionally, he is currently on antibiotics and I would recommend he complete his Keflex before we attempt any invasive procedure due to potential risk of infection.  Patient agreeable to defer as well.  I will keep patient as a close follow-up next week when he has completed his antibiotics at this point and reevaluate his progress.    Return in about 1 week (around 5/14/2024).    Portions of the record may have been created with voice recognition software. Occasional wrong word or \"sound a like\" substitutions may have occurred due to the inherent limitations of voice recognition software. Read the chart carefully and recognize, using context, where substitutions have occurred.     CHIEF COMPLAINT:  Chief Complaint   Patient presents with    Left Elbow - Pain   Right middle finger pain      HPI:  Ronak Lisa is a 53 y.o. male  who presents for       Visit 5/6/2024:  Initial evaluation of 2 issues  Of note, patient has h/o gout    Right middle finger pain  Initial issue that started on 4/15/2024  Atraumatic - but does reports prior fracture of right middle digit in the past treated conservatively.  Reports swelling, redness, warmth, and limited ROM of the PIP " and it was stuck in flexion (picture was noted on his UC note as well)  Went to  on 4/16/2024 - note review - prescribed keflex, prednisone and imaging was obtained.  Reports improvement of pain,swelling, redness s/p these treatments but had a persistent cystic presence over dorsal aspect of PIP joint.     Left elbow pain  Noticed this issue started a few days after his right middle finger issue  Pain and swelling/redness along tip and lateral aspect of elbow. Reports progressively developed swelling/redness/warmth at the tip of his elbow along olecranon  Eventually went to ER on 4/30/2024 - suspected olecranon bursitis - given an IM dose of steroids and prescribed a second round of prednisone and Keflex.    He is here today noting that the pain and swelling have improved over hir left elbow but still has aggravated tip of elbow and lateral elbow pain with active ROM and extending his elbow against resistance. Described as a sharp/aching pain. Denies erythema of elbow/digit.    Respiratory middle digit he reports full range of motion again denies erythema.  However he states there is a persistence of the cystic swelling on the dorsal aspect of his MCP.  Denies any drainage from the site.  Reports pain can be aggravated with active range of motion movements and use of his right hand during the day which she does feel some degree of stiffness.  He states the swelling can sometimes change as well.  He is unsure if the cystic swelling will proceed or if it has to be drained/removed.    Reports he still has a few days days left on his antibiotics.  In regards to his prednisone, he states he has been only taking it intermittently on some days and not a consistent daily use as he would have been completed his dose 2 days ago.  He states prednisone is only when he feels it provides help as he reports no relief with use of Tylenol/NSAIDs.    He does have a history of gout but states the pain over his finger and elbow feel  "different than his gout flares in the past.  He feels that he has sustained the medications for his gout prevention    Denies experiencing F/C/N/V,diaphoresis        Medical, Surgical, Family, and Social History    Past Medical History:   Diagnosis Date    Arthritis     Cellulitis     Gout     Hypertension      Past Surgical History:   Procedure Laterality Date    TONSILLECTOMY       Social History   Social History     Substance and Sexual Activity   Alcohol Use Yes    Comment: occasional     Social History     Substance and Sexual Activity   Drug Use No     Social History     Tobacco Use   Smoking Status Former    Types: Cigarettes   Smokeless Tobacco Never   Tobacco Comments    Never a smoker as per [employer]     Family History   Problem Relation Age of Onset    Diabetes Mother      No Known Allergies       Physical Exam  /75 (BP Location: Left arm)   Pulse 62   Temp 97.7 °F (36.5 °C)   Ht 6' 1\" (1.854 m)   Wt (!) 146 kg (322 lb)   SpO2 96%   BMI 42.48 kg/m²     Constitutional:  see vital signs  Gen: Obese, normocephalic/atraumatic, well-groomed  SKIN: no visible rashes or skin lesions  Pulmonary/Chest: Effort normal. No respiratory distress.     Right Elbow Exam     Tenderness   The patient is experiencing tenderness in the lateral epicondyle (+ Olecranon tip of elbow).     Range of Motion   Extension:  0 (Full extension aggravates the tip of his olecranon pain)   Flexion:  140     Muscle Strength   Pronation:  5/5   Supination:  5/5     Tests   Varus: negative  Valgus: negative  Tinel's sign (cubital tunnel): negative    Other   Erythema: absent    Comments:  Cozen sign: Negative    Resisted elbow flexion: 5/5  Resisted elbow extension 5/5 and aggravates olecranon tip pain          Right middle digit exam:  Inspection: There is a rounded, soft, mobile cystic lesion on the dorsal medial aspect of his PIP joint.  There is no edema, erythema, ecchymosis surrounding this cyst.  Palpation: Patient " reports minimal to no pain when palpating throughout his middle digit.  ROM: Full/intact ROM at the MCP/PIP/DIP.  He does report aggravation along the dorsum of his PIP joint during these maneuvers  Strength: /wrist extension/flexion 5/5  Valgus/varus stress testing of the PIP does not cause excess laxity and only mildly aggravates his PIP pain.  No pain or laxity with DIP valgus/varus stress testing  Sensation intact  Capillary refill less than 2 seconds    Procedures

## 2024-07-04 ENCOUNTER — OFFICE VISIT (OUTPATIENT)
Dept: URGENT CARE | Facility: CLINIC | Age: 54
End: 2024-07-04
Payer: COMMERCIAL

## 2024-07-04 VITALS
HEIGHT: 73 IN | HEART RATE: 78 BPM | TEMPERATURE: 98.2 F | SYSTOLIC BLOOD PRESSURE: 134 MMHG | DIASTOLIC BLOOD PRESSURE: 87 MMHG | RESPIRATION RATE: 18 BRPM | WEIGHT: 315 LBS | OXYGEN SATURATION: 96 % | BODY MASS INDEX: 41.75 KG/M2

## 2024-07-04 DIAGNOSIS — L03.115 CELLULITIS OF RIGHT LOWER EXTREMITY: Primary | ICD-10-CM

## 2024-07-04 PROCEDURE — G0382 LEV 3 HOSP TYPE B ED VISIT: HCPCS

## 2024-07-04 PROCEDURE — S9083 URGENT CARE CENTER GLOBAL: HCPCS

## 2024-07-04 RX ORDER — CEPHALEXIN 500 MG/1
500 CAPSULE ORAL EVERY 6 HOURS SCHEDULED
Qty: 28 CAPSULE | Refills: 0 | Status: SHIPPED | OUTPATIENT
Start: 2024-07-04 | End: 2024-07-11

## 2024-07-04 NOTE — PROGRESS NOTES
[facility]        NAME: Ronak Lisa is a 53 y.o. male  : 1970    MRN: [id number]  DATE: 2024  TIME: 11:04 AM    Assessment and Plan   Cellulitis of right lower extremity [L03.115]  1. Cellulitis of right lower extremity  cephalexin (KEFLEX) 500 mg capsule            Patient Instructions     Keep the leg elevated when possible to decreased the swelling.     Keep the leg clean and dry.     If the redness and swelling is not improved by [place] morning, you will need to go to the emergency department for further evaluation.    Take the antibiotics as prescribed for the full course unless directed otherwise by a medical provider.    Follow up with PCP in 3-5 days.  Proceed to  ER if symptoms worsen.    If tests are performed, our office will contact you with results only if changes need to made to the care plan discussed with you at the visit. You can review your full results on [facility].    Chief Complaint     Chief Complaint   Patient presents with    Leg Swelling     Right leg swelling, pain; under knee   Took 2 prednisone 20mg -1 yesterday and 1 today(left over from another problem)  Started Monday morning; has been getting worse  Stated he did fall on the outside of that leg about 3 weeks ago and had gravel all over it         History of Present Illness       53-year-old male presenting with complaint of right leg swelling and pain under the knee.  He had prednisone leftover from a prior problem/visit and states he took 2 prednisone 20 mg tablets, 1 yesterday and 1 today.  The pain started on Monday morning and has been getting worse.  He states about 3 weeks ago he did fall on the outside of that leg and his skin had gravel all over it.        Review of Systems   Review of Systems   Constitutional:  Negative for activity change, appetite change, chills, fatigue and fever.   Respiratory:  Negative for cough and shortness of breath.    Cardiovascular:  Positive for leg  swelling (RLE).   Gastrointestinal:  Negative for abdominal pain, diarrhea, nausea and vomiting.   Musculoskeletal:  Negative for arthralgias, gait problem, myalgias, neck pain and neck stiffness.   Skin:  Positive for color change (redness to right lower leg) and wound (healing on right proximal lower leg, lateral aspect).         Current Medications       Current Outpatient Medications:     amLODIPine (NORVASC) 5 mg tablet, Take 1 tablet (5 mg total) by mouth daily, Disp: 30 tablet, Rfl: 0    aspirin 81 mg chewable tablet, Chew 81 mg daily, Disp: , Rfl:     cephalexin (KEFLEX) 500 mg capsule, Take 1 capsule (500 mg total) by mouth every 6 (six) hours for 7 days, Disp: 28 capsule, Rfl: 0    colchicine (COLCRYS) 0.6 mg tablet, TAKE (1) TABLET DAILY., Disp: 30 tablet, Rfl: 0    febuxostat (ULORIC) 40 mg tablet, Take 40 mg by mouth daily, Disp: , Rfl:     losartan (COZAAR) 50 mg tablet, Take 50 mg by mouth daily, Disp: , Rfl:     montelukast (SINGULAIR) 10 mg tablet, Take 10 mg by mouth daily at bedtime, Disp: , Rfl:     Multiple Vitamins-Minerals (multivitamin with minerals) tablet, Take 1 tablet by mouth daily, Disp: , Rfl:     naproxen (EC NAPROSYN) 375 MG TBEC, Take by mouth, Disp: , Rfl:     al mag oxide-diphenhydramine-lidocaine viscous (MAGIC MOUTHWASH) 1:1:1 suspension, Swish and spit 10 mL every 4 (four) hours as needed for mouth pain or discomfort (Patient not taking: Reported on 1/26/2022), Disp: 90 mL, Rfl: 0    allopurinol (ZYLOPRIM) 300 mg tablet, , Disp: , Rfl:     ascorbic acid (VITAMIN C) 250 mg tablet, Take 250 mg by mouth daily (Patient not taking: Reported on 4/24/2023), Disp: , Rfl:     benzonatate (TESSALON) 200 MG capsule, Take 1 capsule (200 mg total) by mouth 3 (three) times a day as needed for cough (Patient not taking: Reported on 8/2/2023), Disp: 20 capsule, Rfl: 0    cyclobenzaprine (FLEXERIL) 10 mg tablet, Take 1 tablet (10 mg total) by mouth daily at bedtime (Patient not taking: Reported  "on 5/7/2024), Disp: 30 tablet, Rfl: 0    enalapril (VASOTEC) 20 mg tablet, Take 1 tablet (20 mg total) by mouth daily (Patient not taking: Reported on 4/16/2024), Disp: 30 tablet, Rfl: 0    ibuprofen (MOTRIN) 800 mg tablet, Take 1 tablet (800 mg total) by mouth 3 (three) times a day (Patient not taking: Reported on 5/7/2024), Disp: 21 tablet, Rfl: 0    indomethacin (INDOCIN) 50 mg capsule, Take 1 capsule (50 mg total) by mouth 3 (three) times a day with meals (Patient not taking: Reported on 1/26/2022), Disp: 21 capsule, Rfl: 0    methylPREDNISolone 4 MG tablet therapy pack, Use as directed on package (Patient not taking: Reported on 7/4/2024), Disp: 1 each, Rfl: 0    Current Allergies     Allergies as of 07/04/2024    (No Known Allergies)            The following portions of the patient's history were reviewed and updated as appropriate: allergies, current medications, past family history, past medical history, past social history, past surgical history and problem list.     Past Medical History:   Diagnosis Date    Arthritis     Cellulitis     Gout     Hypertension        Past Surgical History:   Procedure Laterality Date    TONSILLECTOMY         Family History   Problem Relation Age of Onset    Diabetes Mother          Medications have been verified.        Objective   /87   Pulse 78   Temp 98.2 °F (36.8 °C)   Resp 18   Ht 6' 1\" (1.854 m)   Wt (!) 145 kg (320 lb 6.4 oz)   SpO2 96%   BMI 42.27 kg/m²        Physical Exam     Physical Exam  Vitals and nursing note reviewed.   Constitutional:       General: He is not in acute distress.     Appearance: He is obese. He is not ill-appearing, toxic-appearing or diaphoretic.   HENT:      Head: Normocephalic and atraumatic.   Cardiovascular:      Rate and Rhythm: Normal rate and regular rhythm.      Pulses: Normal pulses.      Heart sounds: Normal heart sounds.   Pulmonary:      Effort: Pulmonary effort is normal.      Breath sounds: Normal breath sounds. "   Musculoskeletal:         General: Swelling and signs of injury (right lower leg proximal aspect; multiple healing abrasions present) present. Normal range of motion.      Right lower leg: Edema present.   Neurological:      Mental Status: He is alert.

## 2024-07-04 NOTE — PATIENT INSTRUCTIONS
Keep the leg elevated when possible to decreased the swelling.     Keep the leg clean and dry.     If the redness and swelling is not improved by Sunday morning, you will need to go to the emergency department for further evaluation.    Take the antibiotics as prescribed for the full course unless directed otherwise by a medical provider.

## 2024-08-19 ENCOUNTER — OFFICE VISIT (OUTPATIENT)
Dept: OBGYN CLINIC | Facility: CLINIC | Age: 54
End: 2024-08-19
Payer: COMMERCIAL

## 2024-08-19 VITALS
HEART RATE: 76 BPM | SYSTOLIC BLOOD PRESSURE: 116 MMHG | OXYGEN SATURATION: 99 % | TEMPERATURE: 97.5 F | BODY MASS INDEX: 40.74 KG/M2 | HEIGHT: 73 IN | WEIGHT: 307.4 LBS | DIASTOLIC BLOOD PRESSURE: 72 MMHG

## 2024-08-19 DIAGNOSIS — M25.561 CHRONIC PAIN OF RIGHT KNEE: ICD-10-CM

## 2024-08-19 DIAGNOSIS — E66.01 CLASS 3 SEVERE OBESITY DUE TO EXCESS CALORIES WITH SERIOUS COMORBIDITY AND BODY MASS INDEX (BMI) OF 45.0 TO 49.9 IN ADULT (HCC): ICD-10-CM

## 2024-08-19 DIAGNOSIS — M17.12 PRIMARY OSTEOARTHRITIS OF LEFT KNEE: Primary | ICD-10-CM

## 2024-08-19 DIAGNOSIS — G89.29 CHRONIC PAIN OF RIGHT KNEE: ICD-10-CM

## 2024-08-19 PROCEDURE — 20610 DRAIN/INJ JOINT/BURSA W/O US: CPT | Performed by: ORTHOPAEDIC SURGERY

## 2024-08-19 PROCEDURE — 99214 OFFICE O/P EST MOD 30 MIN: CPT | Performed by: ORTHOPAEDIC SURGERY

## 2024-08-19 RX ORDER — TRIAMCINOLONE ACETONIDE 40 MG/ML
40 INJECTION, SUSPENSION INTRA-ARTICULAR; INTRAMUSCULAR
Status: COMPLETED | OUTPATIENT
Start: 2024-08-19 | End: 2024-08-19

## 2024-08-19 RX ORDER — BUPIVACAINE HYDROCHLORIDE 2.5 MG/ML
4 INJECTION, SOLUTION INFILTRATION; PERINEURAL
Status: COMPLETED | OUTPATIENT
Start: 2024-08-19 | End: 2024-08-19

## 2024-08-19 RX ADMIN — BUPIVACAINE HYDROCHLORIDE 4 ML: 2.5 INJECTION, SOLUTION INFILTRATION; PERINEURAL at 10:15

## 2024-08-19 RX ADMIN — TRIAMCINOLONE ACETONIDE 40 MG: 40 INJECTION, SUSPENSION INTRA-ARTICULAR; INTRAMUSCULAR at 10:15

## 2024-08-19 NOTE — PROGRESS NOTES
ASSESSMENT/PLAN:    Diagnoses and all orders for this visit:    Primary osteoarthritis of left knee    Chronic pain of right knee  -     Ambulatory Referral to Orthopedic Surgery    Class 3 severe obesity due to excess calories with serious comorbidity and body mass index (BMI) of 45.0 to 49.9 in adult (HCC)    Chronic gout    Plan: Treatment was discussed.  He elected to proceed with injection of local anesthetic and corticosteroid.  He tolerated this well.  I offered to see him in 2 weeks but he would prefer to return only if symptoms do not respond.  I have encouraged him to contact me if any questions or concerns do arise.    Return if symptoms worsen or fail to improve.      _____________________________________________________  CHIEF COMPLAINT:  Chief Complaint   Patient presents with    Right Knee - Swelling, Pain         SUBJECTIVE:  Ronak Lisa is a 53 y.o. year old male who presents for evaluation of his left knee.  He has noted left knee pain for at least 1 year with episodes of worsening pain on an occasional basis.  About 1-1/2 weeks ago he noted an acute worsening of his right knee pain and swelling.  He has not seen anybody about the symptoms and there has been a significant improvement but he continues to experience right knee pain greater than his baseline.  The swelling seems to have resolved.  He did note a generalized warmth around the knee but was not overly red.  He had some prednisone tablets at home which she has been taking intermittently and this seems to have helped his symptoms.  He was seen in the emergency room at [facility] in November 2023 with complaints of right knee pain and x-rays were obtained at that time.  He has not had any x-rays in the interim.  He denies lower extremity paresthesias.  He does have a history of gout but states that the gout has been well-controlled recently.    PAST MEDICAL HISTORY:  Past Medical History:   Diagnosis Date    Arthritis      Cellulitis     Gout     Hypertension        PAST SURGICAL HISTORY:  Past Surgical History:   Procedure Laterality Date    TONSILLECTOMY         FAMILY HISTORY:  Family History   Problem Relation Age of Onset    Diabetes Mother        SOCIAL HISTORY:  Social History     Tobacco Use    Smoking status: Former     Types: Cigarettes    Smokeless tobacco: Never    Tobacco comments:     Never a smoker as per [employer]   Vaping Use    Vaping status: Never Used   Substance Use Topics    Alcohol use: Yes     Comment: occasional    Drug use: No       MEDICATIONS:    Current Outpatient Medications:     amLODIPine (NORVASC) 5 mg tablet, Take 1 tablet (5 mg total) by mouth daily, Disp: 30 tablet, Rfl: 0    aspirin 81 mg chewable tablet, Chew 81 mg daily, Disp: , Rfl:     colchicine (COLCRYS) 0.6 mg tablet, TAKE (1) TABLET DAILY., Disp: 30 tablet, Rfl: 0    febuxostat (ULORIC) 40 mg tablet, Take 40 mg by mouth daily, Disp: , Rfl:     losartan (COZAAR) 50 mg tablet, Take 50 mg by mouth daily, Disp: , Rfl:     montelukast (SINGULAIR) 10 mg tablet, Take 10 mg by mouth daily at bedtime, Disp: , Rfl:     Multiple Vitamins-Minerals (multivitamin with minerals) tablet, Take 1 tablet by mouth daily, Disp: , Rfl:     al mag oxide-diphenhydramine-lidocaine viscous (MAGIC MOUTHWASH) 1:1:1 suspension, Swish and spit 10 mL every 4 (four) hours as needed for mouth pain or discomfort (Patient not taking: Reported on 1/26/2022), Disp: 90 mL, Rfl: 0    allopurinol (ZYLOPRIM) 300 mg tablet, , Disp: , Rfl:     ascorbic acid (VITAMIN C) 250 mg tablet, Take 250 mg by mouth daily (Patient not taking: Reported on 4/24/2023), Disp: , Rfl:     benzonatate (TESSALON) 200 MG capsule, Take 1 capsule (200 mg total) by mouth 3 (three) times a day as needed for cough (Patient not taking: Reported on 8/2/2023), Disp: 20 capsule, Rfl: 0    cyclobenzaprine (FLEXERIL) 10 mg tablet, Take 1 tablet (10 mg total) by mouth daily at bedtime (Patient not taking: Reported  "on 5/7/2024), Disp: 30 tablet, Rfl: 0    enalapril (VASOTEC) 20 mg tablet, Take 1 tablet (20 mg total) by mouth daily (Patient not taking: Reported on 4/16/2024), Disp: 30 tablet, Rfl: 0    ibuprofen (MOTRIN) 800 mg tablet, Take 1 tablet (800 mg total) by mouth 3 (three) times a day (Patient not taking: Reported on 5/7/2024), Disp: 21 tablet, Rfl: 0    indomethacin (INDOCIN) 50 mg capsule, Take 1 capsule (50 mg total) by mouth 3 (three) times a day with meals (Patient not taking: Reported on 1/26/2022), Disp: 21 capsule, Rfl: 0    methylPREDNISolone 4 MG tablet therapy pack, Use as directed on package (Patient not taking: Reported on 7/4/2024), Disp: 1 each, Rfl: 0    naproxen (EC NAPROSYN) 375 MG TBEC, Take by mouth (Patient not taking: Reported on 8/19/2024), Disp: , Rfl:     ALLERGIES:  No Known Allergies    Review of systems:   Constitutional: Negative for fatigue, fever or loss of apetite.   HENT: Negative.    Respiratory: Negative for shortness of breath, dyspnea.    Cardiovascular: Negative for chest pain/tightness.   Gastrointestinal: Negative for abdominal pain, N/V.   Endocrine: Negative for cold/heat intolerance, unexplained weight loss/gain.   Genitourinary: Negative for flank pain, dysuria, hematuria.   Musculoskeletal: Positive as in the HPI  Skin: Negative for rash.    Neurological: Negative  Psychiatric/Behavioral: Negative for agitation.  _____________________________________________________  PHYSICAL EXAMINATION:    Blood pressure 116/72, pulse 76, temperature 97.5 °F (36.4 °C), temperature source Temporal, height 6' 1\" (1.854 m), weight (!) 139 kg (307 lb 6.4 oz), SpO2 99%.    General: well developed and well nourished, alert, oriented times 3, and appears comfortable  Psychiatric: Normal  HEENT: Benign  Cardiovascular: Regular    Pulmonary: No wheezing or stridor  Abdomen: Soft, Nontender  Skin: No masses, erythema, lacerations, fluctation, ulcerations  Neurovascular: Motor and sensory exams are " intact and pulses are palpable.    MUSCULOSKELETAL EXAMINATION:    Right knee exam demonstrates no effusion.  The skin is warm and dry and there is no erythema or rubor.  He has full extension and flexion to 120 degrees with complaints of pain at endrange extension.  Ligaments are stable.  He has tenderness over the anterior knee.  The medial and lateral joint lines were nontender.  Femoral condyles and tibial plateau were nontender.  There is some crepitus noted during active range of motion.  The remainder of the right lower extremity exam is benign.      _____________________________________________________  STUDIES REVIEWED:  X-rays of the right knee from November 2023 demonstrate moderate to significant degenerative disease with the greatest degree of change noted at the patellofemoral joint.    The x-ray report was reviewed.    PROCEDURES:  Large joint arthrocentesis: R knee  Universal Protocol:  Consent: Verbal consent obtained.  Consent given by: patient  Patient understanding: patient states understanding of the procedure being performed  Supporting Documentation  Indications: pain   Procedure Details  Location: knee - R knee  Needle size: 22 G  Ultrasound guidance: no  Approach: lateral  Medications administered: 4 mL bupivacaine 0.25 %; 40 mg triamcinolone acetonide 40 mg/mL              Kd Hoffman DO

## 2025-05-14 ENCOUNTER — HOSPITAL ENCOUNTER (OUTPATIENT)
Dept: RADIOLOGY | Facility: CLINIC | Age: 55
Discharge: HOME | End: 2025-05-14
Attending: PHYSICIAN ASSISTANT
Payer: COMMERCIAL

## 2025-05-14 ENCOUNTER — OFFICE VISIT (OUTPATIENT)
Dept: OBGYN CLINIC | Facility: CLINIC | Age: 55
End: 2025-05-14
Payer: COMMERCIAL

## 2025-05-14 VITALS — WEIGHT: 265 LBS | BODY MASS INDEX: 35.12 KG/M2 | HEIGHT: 73 IN

## 2025-05-14 DIAGNOSIS — M25.511 ACUTE PAIN OF RIGHT SHOULDER: Primary | ICD-10-CM

## 2025-05-14 DIAGNOSIS — M17.12 PRIMARY OSTEOARTHRITIS OF LEFT KNEE: ICD-10-CM

## 2025-05-14 DIAGNOSIS — M25.511 ACUTE PAIN OF RIGHT SHOULDER: ICD-10-CM

## 2025-05-14 DIAGNOSIS — M19.011 ARTHRITIS OF RIGHT SHOULDER REGION: ICD-10-CM

## 2025-05-14 PROCEDURE — 20610 DRAIN/INJ JOINT/BURSA W/O US: CPT | Performed by: ORTHOPAEDIC SURGERY

## 2025-05-14 PROCEDURE — 99214 OFFICE O/P EST MOD 30 MIN: CPT | Performed by: ORTHOPAEDIC SURGERY

## 2025-05-14 PROCEDURE — 73030 X-RAY EXAM OF SHOULDER: CPT

## 2025-05-14 RX ORDER — TRIAMCINOLONE ACETONIDE 40 MG/ML
40 INJECTION, SUSPENSION INTRA-ARTICULAR; INTRAMUSCULAR
Status: COMPLETED | OUTPATIENT
Start: 2025-05-14 | End: 2025-05-14

## 2025-05-14 RX ORDER — BUPIVACAINE HYDROCHLORIDE 2.5 MG/ML
4 INJECTION, SOLUTION INFILTRATION; PERINEURAL
Status: COMPLETED | OUTPATIENT
Start: 2025-05-14 | End: 2025-05-14

## 2025-05-14 RX ADMIN — TRIAMCINOLONE ACETONIDE 40 MG: 40 INJECTION, SUSPENSION INTRA-ARTICULAR; INTRAMUSCULAR at 09:45

## 2025-05-14 RX ADMIN — BUPIVACAINE HYDROCHLORIDE 4 ML: 2.5 INJECTION, SOLUTION INFILTRATION; PERINEURAL at 09:45

## 2025-05-14 NOTE — ASSESSMENT & PLAN NOTE
In regards to his left knee, he feels that the symptoms are minimal at this time and did not want to pursue any additional treatment.  He was encouraged to contact me if symptoms were to worsen and he wishes reevaluation and discussion of further treatment options.

## 2025-05-14 NOTE — PROGRESS NOTES
Name: Ronak Lisa      : 1970      MRN: [id number]  Encounter Provider: Kd Hoffman DO  Encounter Date: 2025   Encounter department: [facility] [place]  :  Assessment & Plan  Acute pain of right shoulder  Treatment was discussed.  He elected to proceed with subacromial injection.  He tolerated this well.  I discussed further follow-up and he would prefer to return only if the injection does not provide him adequate relief.  I have encouraged him to contact me if any questions or concerns arise.  Orders:    XR shoulder 2+ vw right; Future    Arthritis of right shoulder region         Primary osteoarthritis of left knee  In regards to his left knee, he feels that the symptoms are minimal at this time and did not want to pursue any additional treatment.  He was encouraged to contact me if symptoms were to worsen and he wishes reevaluation and discussion of further treatment options.       Large joint arthrocentesis: L subacromial bursa    Performed by: Kd Hoffman DO  Authorized by: Kd Hoffman DO    Universal Protocol:  Consent: Verbal consent obtained  Consent given by: patient  Patient understanding: patient states understanding of the procedure being performed  Supporting Documentation  Indications: pain     Is this a Visco injection? NoProcedure Details  Location: shoulder - L subacromial bursa  Ultrasound guidance: no  Approach: posterolateral  Medications administered: 4 mL bupivacaine 0.25 %; 40 mg triamcinolone acetonide 40 mg/mL              History of Present Illness   HPI  Ronak Lisa is a 54 y.o. male RHD who presents follow-up for liver pain.  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  He notes that he has had pain in the right shoulder for approximately the past 6 months without specific has been sleeping in a recliner because of this.  He states he does not have pain every day and but that the pain seems more if he  "is putting pressure on the shoulder.  He denies any associated numbness or tingling.  There were x-rays of the shoulder from 11/2/2023 which noted mild glenoid inferior spur.  Also makes note of the fact that he has some increasing left knee discomfort for the past 2 months.  He states that shoulder is his primary concern however.  He did have a cortisone injection in the right knee in August 2024 for treatment of OA with excellent pain relief 2 months ago.  No locking or catching.  No trauma or injury.      Review of Systems  Medications Ordered Prior to Encounter[1]   Social History     Tobacco Use    Smoking status: Former     Types: Cigarettes    Smokeless tobacco: Never    Tobacco comments:     Never a smoker as per [employer]   Vaping Use    Vaping status: Never Used   Substance and Sexual Activity    Alcohol use: Yes     Comment: occasional    Drug use: No    Sexual activity: Not on file      Objective   Ht 6' 1\" (1.854 m)   Wt 120 kg (265 lb)   BMI 34.96 kg/m²      Physical Exam    Right shoulder primary location of pain near the AC joint.  With palpation to the bilateral AC joints he notes that he has discomfort in the right shoulder and not the left.  He is able to get full active flexion and abduction of the shoulder.  With the brought into abduction there is slight tightness with 90 degrees of external rotation.  He is mildly positive impingement sign.  He has a negative cross chest impingement sign.  There is no appreciable tenderness with palpation of the anterior lateral humeral joint region.  He has negative Naty's test and no gross weakness with subscap liftoff test but faint discomfort in the shoulder area.  He has no pain or weakness with hornblower's test.    Right knee no gross intra-articular effusion.  He has tenderness over the medial joint line and medial femoral condyle.  Discrete patella facet tenderness but mild patellofemoral crepitation.  He is able to get full extension with flexion " to 125 degrees.  Gross ligamentous laxity.    I personally viewed x-rays of the right shoulder taken in the office today which document narrowing of the AC joint with superior spurring of the distal clavicle.  There is a small inferior glenoid spur and some slight narrowing of the glenohumeral joint space as well as subchondral sclerosis seen primarily on the Grashey view.       [1]   Current Outpatient Medications on File Prior to Visit   Medication Sig Dispense Refill    amLODIPine (NORVASC) 5 mg tablet Take 1 tablet (5 mg total) by mouth daily 30 tablet 0    aspirin 81 mg chewable tablet Chew 81 mg in the morning.      colchicine (COLCRYS) 0.6 mg tablet TAKE (1) TABLET DAILY. 30 tablet 0    cyclobenzaprine (FLEXERIL) 10 mg tablet Take 1 tablet (10 mg total) by mouth daily at bedtime 30 tablet 0    febuxostat (ULORIC) 40 mg tablet Take 40 mg by mouth in the morning.      losartan (COZAAR) 50 mg tablet Take 50 mg by mouth in the morning.      montelukast (SINGULAIR) 10 mg tablet Take 10 mg by mouth daily at bedtime      Multiple Vitamins-Minerals (multivitamin with minerals) tablet Take 1 tablet by mouth in the morning.      al mag oxide-diphenhydramine-lidocaine viscous (MAGIC MOUTHWASH) 1:1:1 suspension Swish and spit 10 mL every 4 (four) hours as needed for mouth pain or discomfort (Patient not taking: Reported on 5/14/2025) 90 mL 0    allopurinol (ZYLOPRIM) 300 mg tablet  (Patient not taking: Reported on 5/14/2025)      ascorbic acid (VITAMIN C) 250 mg tablet Take 250 mg by mouth daily (Patient not taking: Reported on 5/14/2025)      benzonatate (TESSALON) 200 MG capsule Take 1 capsule (200 mg total) by mouth 3 (three) times a day as needed for cough (Patient not taking: Reported on 5/14/2025) 20 capsule 0    enalapril (VASOTEC) 20 mg tablet Take 1 tablet (20 mg total) by mouth daily (Patient not taking: Reported on 5/14/2025) 30 tablet 0    ibuprofen (MOTRIN) 800 mg tablet Take 1 tablet (800 mg total) by mouth  3 (three) times a day (Patient not taking: Reported on 5/14/2025) 21 tablet 0    indomethacin (INDOCIN) 50 mg capsule Take 1 capsule (50 mg total) by mouth 3 (three) times a day with meals (Patient not taking: Reported on 5/14/2025) 21 capsule 0    methylPREDNISolone 4 MG tablet therapy pack Use as directed on package (Patient not taking: Reported on 5/14/2025) 1 each 0    naproxen (EC NAPROSYN) 375 MG TBEC Take by mouth (Patient not taking: Reported on 5/14/2025)       No current facility-administered medications on file prior to visit.

## 2025-06-24 ENCOUNTER — APPOINTMENT (EMERGENCY)
Dept: RADIOLOGY | Facility: HOSPITAL | Age: 55
End: 2025-06-24
Payer: COMMERCIAL

## 2025-06-24 ENCOUNTER — HOSPITAL ENCOUNTER (EMERGENCY)
Facility: HOSPITAL | Age: 55
Discharge: HOME | End: 2025-06-24
Attending: EMERGENCY MEDICINE | Admitting: EMERGENCY MEDICINE
Payer: COMMERCIAL

## 2025-06-24 VITALS
BODY MASS INDEX: 33.19 KG/M2 | TEMPERATURE: 98.8 F | DIASTOLIC BLOOD PRESSURE: 89 MMHG | WEIGHT: 251.54 LBS | SYSTOLIC BLOOD PRESSURE: 135 MMHG | HEART RATE: 72 BPM | OXYGEN SATURATION: 97 % | RESPIRATION RATE: 17 BRPM

## 2025-06-24 DIAGNOSIS — S46.811A STRAIN OF RIGHT TRAPEZIUS MUSCLE, INITIAL ENCOUNTER: Primary | ICD-10-CM

## 2025-06-24 DIAGNOSIS — S70.01XA CONTUSION OF RIGHT HIP, INITIAL ENCOUNTER: ICD-10-CM

## 2025-06-24 PROCEDURE — 99284 EMERGENCY DEPT VISIT MOD MDM: CPT

## 2025-06-24 PROCEDURE — 99284 EMERGENCY DEPT VISIT MOD MDM: CPT | Performed by: EMERGENCY MEDICINE

## 2025-06-24 PROCEDURE — 73030 X-RAY EXAM OF SHOULDER: CPT

## 2025-06-24 PROCEDURE — 72040 X-RAY EXAM NECK SPINE 2-3 VW: CPT

## 2025-06-24 PROCEDURE — 73502 X-RAY EXAM HIP UNI 2-3 VIEWS: CPT

## 2025-06-24 RX ORDER — PREDNISONE 20 MG/1
40 TABLET ORAL DAILY
Qty: 10 TABLET | Refills: 0 | Status: SHIPPED | OUTPATIENT
Start: 2025-06-24 | End: 2025-06-29

## 2025-06-24 NOTE — ED PROVIDER NOTES
Time reflects when diagnosis was documented in both MDM as applicable and the Disposition within this note       Time User Action Codes Description Comment    6/24/2025 11:12 AM Corbin Sibley Add [S46.811A] Strain of right trapezius muscle, initial encounter     6/24/2025 11:12 AM Corbin Sibley Add [S70.01XA] Contusion of right hip, initial encounter           ED Disposition       ED Disposition   Discharge    Condition   Stable    Date/Time   Tue Jun 24, 2025 11:12 AM    Comment   Ronak Lisa discharge to home/self care.                   Assessment & Plan       Medical Decision Making  Amount and/or Complexity of Data Reviewed  Radiology: ordered and independent interpretation performed. Decision-making details documented in ED Course.    Risk  Prescription drug management.        ED Course as of 06/24/25 1113   Tue Jun 24, 2025   1111 X-ray of the cervical spine shows degenerative changes.  No fracture noted.  X-ray of the right shoulder shows no fracture or dislocation.  X-ray of the pelvis shows no fracture       Medications - No data to display    ED Risk Strat Scores                    No data recorded        SBIRT 22yo+      Flowsheet Row Most Recent Value   Initial Alcohol Screen: US AUDIT-C     1. How often do you have a drink containing alcohol? 0 Filed at: 06/24/2025 1049   2. How many drinks containing alcohol do you have on a typical day you are drinking?  0 Filed at: 06/24/2025 1049   3a. Male UNDER 65: How often do you have five or more drinks on one occasion? 0 Filed at: 06/24/2025 1049   Audit-C Score 0 Filed at: 06/24/2025 1049   LOIDA: How many times in the past year have you...    Used an illegal drug or used a prescription medication for non-medical reasons? Never Filed at: 06/24/2025 1049                            History of Present Illness       Chief Complaint   Patient presents with    Fall     Patient reports 2ft fall off ladder last night landing on right side onto cement.  No headstrike. Takes asa daily.       Past Medical History[1]   Past Surgical History[2]   Family History[3]   Social History[4]   E-Cigarette/Vaping    E-Cigarette Use Never User       E-Cigarette/Vaping Substances      I have reviewed and agree with the history as documented.     Patient was 2 feet up on the ladder.  Thought he was on the bottom rung when he stepped down and fell.  Complains of neck pain and right shoulder and right hip pain.  Occurred last night.  No head strike.  Takes aspirin daily.  No chest pain shortness of breath.  No abdominal pain or back pain.  Eating and drinking well.      History provided by:  Patient   used: No    Fall  Mechanism of injury: fall    Injury location: Right shoulder.  Incident location:  Home  Time since incident:  1 day  Fall:     Fall occurred:  From a ladder    Height of fall:  2 feet    Point of impact: Right shoulder.  Protective equipment: none    Suspicion of alcohol use: no    Suspicion of drug use: no    Prior to arrival data:     Bystander interventions:  None    Patient ambulatory at scene: yes      Blood loss:  None    Responsiveness at scene:  Alert    Orientation at scene:  Person, situation, time and place    Loss of consciousness: no      Amnesic to event: no      Airway interventions:  None    Breathing interventions:  None    IV access status:  None    IO access:  None    Fluids administered:  None    Cardiac interventions:  None    Medications administered:  None    Immobilization:  None    Airway condition since incident:  Stable    Breathing condition since incident:  Stable    Circulation condition since incident:  Stable    Mental status condition since incident:  Stable    Disability condition since incident:  Stable  Associated symptoms: neck pain    Associated symptoms: no abdominal pain, no back pain, no chest pain, no headaches, no hearing loss, no nausea, no seizures and no vomiting        Review of Systems    Constitutional:  Negative for chills and fever.   HENT:  Negative for ear pain, hearing loss, sore throat, trouble swallowing and voice change.    Eyes:  Negative for pain and discharge.   Respiratory:  Negative for cough, shortness of breath and wheezing.    Cardiovascular:  Negative for chest pain and palpitations.   Gastrointestinal:  Negative for abdominal pain, blood in stool, constipation, diarrhea, nausea and vomiting.   Genitourinary:  Negative for dysuria, flank pain, frequency and hematuria.   Musculoskeletal:  Positive for neck pain. Negative for back pain, joint swelling and neck stiffness.   Skin:  Negative for rash and wound.   Neurological:  Negative for dizziness, seizures, syncope, facial asymmetry and headaches.   Psychiatric/Behavioral:  Negative for hallucinations, self-injury and suicidal ideas.    All other systems reviewed and are negative.          Objective       ED Triage Vitals [06/24/25 1041]   Temperature Pulse Blood Pressure Respirations SpO2 Patient Position - Orthostatic VS   98.8 °F (37.1 °C) 72 135/89 17 97 % Sitting      Temp Source Heart Rate Source BP Location FiO2 (%) Pain Score    Temporal Monitor -- -- 7      Vitals      Date and Time Temp Pulse SpO2 Resp BP Pain Score FACES Pain Rating User   06/24/25 1041 98.8 °F (37.1 °C) 72 97 % 17 135/89 7 -- AS            Physical Exam  Vitals and nursing note reviewed.   Constitutional:       General: He is not in acute distress.     Appearance: He is well-developed.   HENT:      Head: Normocephalic and atraumatic.      Right Ear: External ear normal.      Left Ear: External ear normal.     Eyes:      General: No scleral icterus.        Right eye: No discharge.         Left eye: No discharge.      Extraocular Movements: Extraocular movements intact.      Conjunctiva/sclera: Conjunctivae normal.     Neck:      Comments: Tender right paracervical and trapezius muscles.  Cardiovascular:      Rate and Rhythm: Normal rate and regular  rhythm.      Heart sounds: Normal heart sounds. No murmur heard.  Pulmonary:      Effort: Pulmonary effort is normal.      Breath sounds: Normal breath sounds. No wheezing or rales.   Abdominal:      General: Bowel sounds are normal. There is no distension.      Palpations: Abdomen is soft.      Tenderness: There is no abdominal tenderness. There is no guarding or rebound.     Musculoskeletal:         General: No deformity. Normal range of motion.      Cervical back: Normal range of motion and neck supple.      Comments: Right shoulder diffusely tender and with no obvious bony abnormality.  Reah pulse 2+.    Right hip is tender along the buttocks region.  Full range of motion of the hip.     Skin:     General: Skin is warm and dry.      Findings: No rash.     Neurological:      General: No focal deficit present.      Mental Status: He is alert and oriented to person, place, and time.      Cranial Nerves: No cranial nerve deficit.     Psychiatric:         Mood and Affect: Mood normal.         Behavior: Behavior normal.         Thought Content: Thought content normal.         Judgment: Judgment normal.         Results Reviewed       None            XR cervical spine 2 or 3 views    (Results Pending)   XR shoulder 2+ views RIGHT    (Results Pending)   XR hip/pelv 2-3 vws right if performed    (Results Pending)       Procedures    ED Medication and Procedure Management   Prior to Admission Medications   Prescriptions Last Dose Informant Patient Reported? Taking?   Multiple Vitamins-Minerals (multivitamin with minerals) tablet   Yes No   Sig: Take 1 tablet by mouth in the morning.   al mag oxide-diphenhydramine-lidocaine viscous (MAGIC MOUTHWASH) 1:1:1 suspension   No No   Sig: Swish and spit 10 mL every 4 (four) hours as needed for mouth pain or discomfort   Patient not taking: Reported on 5/14/2025   allopurinol (ZYLOPRIM) 300 mg tablet   Yes No   Patient not taking: Reported on 5/14/2025   amLODIPine (NORVASC) 5 mg  tablet   No No   Sig: Take 1 tablet (5 mg total) by mouth daily   ascorbic acid (VITAMIN C) 250 mg tablet   Yes No   Sig: Take 250 mg by mouth daily   Patient not taking: Reported on 5/14/2025   aspirin 81 mg chewable tablet   Yes No   Sig: Chew 81 mg in the morning.   benzonatate (TESSALON) 200 MG capsule   No No   Sig: Take 1 capsule (200 mg total) by mouth 3 (three) times a day as needed for cough   Patient not taking: Reported on 5/14/2025   colchicine (COLCRYS) 0.6 mg tablet   No No   Sig: TAKE (1) TABLET DAILY.   cyclobenzaprine (FLEXERIL) 10 mg tablet   No No   Sig: Take 1 tablet (10 mg total) by mouth daily at bedtime   enalapril (VASOTEC) 20 mg tablet   No No   Sig: Take 1 tablet (20 mg total) by mouth daily   Patient not taking: Reported on 5/14/2025   febuxostat (ULORIC) 40 mg tablet   Yes No   Sig: Take 40 mg by mouth in the morning.   ibuprofen (MOTRIN) 800 mg tablet   No No   Sig: Take 1 tablet (800 mg total) by mouth 3 (three) times a day   Patient not taking: Reported on 5/14/2025   indomethacin (INDOCIN) 50 mg capsule   No No   Sig: Take 1 capsule (50 mg total) by mouth 3 (three) times a day with meals   Patient not taking: Reported on 5/14/2025   losartan (COZAAR) 50 mg tablet   Yes No   Sig: Take 50 mg by mouth in the morning.   methylPREDNISolone 4 MG tablet therapy pack   No No   Sig: Use as directed on package   Patient not taking: Reported on 5/14/2025   montelukast (SINGULAIR) 10 mg tablet   Yes No   Sig: Take 10 mg by mouth daily at bedtime   naproxen (EC NAPROSYN) 375 MG TBEC   Yes No   Sig: Take by mouth   Patient not taking: Reported on 5/14/2025      Facility-Administered Medications: None     Patient's Medications   Discharge Prescriptions    PREDNISONE 20 MG TABLET    Take 2 tablets (40 mg total) by mouth daily for 5 days       Start Date: 6/24/2025 End Date: 6/29/2025       Order Dose: 40 mg       Quantity: 10 tablet    Refills: 0     No discharge procedures on file.  ED SEPSIS  DOCUMENTATION   Time reflects when diagnosis was documented in both MDM as applicable and the Disposition within this note       Time User Action Codes Description Comment    6/24/2025 11:12 AM Corbin Sibley Add [S46.811A] Strain of right trapezius muscle, initial encounter     6/24/2025 11:12 AM Corbin Sibley Add [S70.01XA] Contusion of right hip, initial encounter                    [1]   Past Medical History:  Diagnosis Date    Arthritis     Cellulitis     Gout     Hypertension    [2]   Past Surgical History:  Procedure Laterality Date    TONSILLECTOMY     [3]   Family History  Problem Relation Name Age of Onset    Diabetes Mother     [4]   Social History  Tobacco Use    Smoking status: Former     Types: Cigarettes    Smokeless tobacco: Never    Tobacco comments:     Never a smoker as per [employer]   Vaping Use    Vaping status: Never Used   Substance Use Topics    Alcohol use: Yes     Comment: occasional    Drug use: No        Corbin Sibley MD  06/24/25 7602

## 2025-06-24 NOTE — DISCHARGE INSTRUCTIONS
"Patient Education     Taking care of bruises   The Basics   Written by the doctors and editors at [facility]   What are bruises? -- Bruises happen when blood vessels under the skin break, but the skin isn't cut. Blood leaks into the tissues under the skin. Bruises start off red in color, and then turn blue or purple. As they heal, bruises can turn green and yellow (figure 1). Most bruises heal in 1 to 2 weeks, but some take longer.  Bruises can happen when people get hurt, fall, or bump themselves. People usually have pain and swelling in the area of the bruise. Sometimes, the swelling happens right away. Other times, the swelling starts 1 or 2 days later.  Some people bruise more easily and get worse bruises. These include people who have conditions that keep the blood from clotting normally and people who take medicines to prevent blood clots.  How are bruises treated? -- A bruise will get better on its own. But to feel better and help your bruise heal, you can:   Put a cold gel pack, bag of ice, or bag of frozen vegetables on the injured area every 1 to 2 hours, for 15 minutes each time. Put a thin towel between the ice (or other cold object) and your skin. Use the ice (or other cold object) for at least 6 hours after your injury. Some people find it helpful to ice longer, even up to 2 days after their injury.   Raise the area, if possible - Raising the area above the level of your heart helps to reduce swelling.   Take medicine to reduce the pain and swelling - To treat pain, you can take acetaminophen (sample brand name: Tylenol). To treat pain and swelling, you can take ibuprofen (sample brand names: Advil, Motrin). But people who have certain conditions or take certain medicines should not take ibuprofen. If you are unsure, ask your doctor or nurse if you can take ibuprofen.   Use an elastic bandage - Using an elastic \"compression\" bandage to keep pressure on the area can reduce swelling. Be careful not to wrap " the bandage too tightly. For most injuries, you can use the bandage during the first few days of healing, but take it off when you sleep.  If you have another injury in the same area, like a sprained ankle, you can continue to use the elastic bandage as the injury heals.  Do not use heat packs or a heating pad during the first 48 hours after injury. Heat can increase swelling and pain soon after an injury.  Do not stick a needle or other object in your bruise to drain it.  When should I call the doctor or nurse? -- Call your doctor or nurse if:   You get a fever   Your bruise causes your joints to swell   You can't move or walk because of your bruise   You get bruises for no reason or have unusual bleeding, such as from your gums or in your urine  All topics are updated as new evidence becomes available and our peer review process is complete.  This topic retrieved from [employer] on: Feb 26, 2024.  Topic 30353 Version 11.0  Release: 32.2.4 - C32.56  © 2024 UpToDate, Inc[employer] and/or its affiliates. All rights reserved.  figure 1: How bruises heal     This drawing shows how a bruise changes color as it heals. A bruise starts off red in color (as shown in A) and then turns blue or purple (as shown in B). As a bruise heals, it can turn green and yellow (as shown in C).  Graphic 55975 Version 4.0  Consumer Information Use and Disclaimer   Disclaimer: This generalized information is a limited summary of diagnosis, treatment, and/or medication information. It is not meant to be comprehensive and should be used as a tool to help the user understand and/or assess potential diagnostic and treatment options. It does NOT include all information about conditions, treatments, medications, side effects, or risks that may apply to a specific patient. It is not intended to be medical advice or a substitute for the medical advice, diagnosis, or treatment of a health care provider based on the health care provider's examination and  assessment of a patient's specific and unique circumstances. Patients must speak with a health care provider for complete information about their health, medical questions, and treatment options, including any risks or benefits regarding use of medications. This information does not endorse any treatments or medications as safe, effective, or approved for treating a specific patient. UpToDate, Inc. and its affiliates disclaim any warranty or liability relating to this information or the use thereof.The use of this information is governed by the Terms of Use, available at https://www.mydeco.com/en/know/clinical-effectiveness-terms. 2024© UpToDate, Inc. and its affiliates and/or licensors. All rights reserved.  Copyright   © 2024 [employer]Date, Inc. and/or its affiliates. All rights reserved.  Patient Education     Muscle Strain Discharge Instructions   About this topic   A muscle strain is also known as a pulled muscle. A muscle strain happens when muscles are stretched too much or work too hard. It can also happen if muscles are stretched too quickly. Muscle strains can be minor or serious. The amount of time it takes to heal will depend on how bad your muscle strain is as well as your age and overall health.       What care is needed at home?   Ask your doctor what you need to do when you go home. Make sure you ask questions if you do not understand what the doctor says. This way you will know what you need to do.  Rest your muscle. If you can, prop it on pillows when you rest. Once you have less pain, slowly increase your activity level. If your muscle starts to hurt again, rest it.  Place an ice pack or a bag of frozen vegetables wrapped in a towel over the painful part. Never put ice right on the skin. Use ice every 1 to 2 hours for 10 to 15 minutes at a time. Use for the first 24 to 48 hours after your injury.  You may want to take medicines like acetaminophen, ibuprofen, or naproxen for swelling and  pain.  What follow-up care is needed?   Your doctor may ask you to make visits to the office to check on your progress. Be sure to keep these visits.  What drugs may be needed?   The doctor may order drugs to:  Help with pain and swelling  Will physical activity be limited?   You may have to limit your activity. Talk to your doctor about the right amount of activity for you.  What can be done to prevent this health problem?   Warm-up before and cool-down after playing sports.  Drink lots of water during and after a workout.  Stretch your muscles on a regular basis.  Wear proper clothing or footwear when you are playing sports.  When do I need to call the doctor?   You are not able to move the injured muscle because of the pain.  The pain or swelling become worse.  You keep straining the same muscle.  Teach Back: Helping You Understand   The Teach Back Method helps you understand the information we are giving you. After you talk with the staff, tell them in your own words what you learned. This helps to make sure the staff has described each thing clearly. It also helps to explain things that may have been confusing. Before going home, make sure you can do these:  I can tell you about my condition.  I can tell you what may help ease my pain.  I can tell you what I will do if I have more pain or swelling.  Last Reviewed Date   2021-06-18  Consumer Information Use and Disclaimer   This generalized information is a limited summary of diagnosis, treatment, and/or medication information. It is not meant to be comprehensive and should be used as a tool to help the user understand and/or assess potential diagnostic and treatment options. It does NOT include all information about conditions, treatments, medications, side effects, or risks that may apply to a specific patient. It is not intended to be medical advice or a substitute for the medical advice, diagnosis, or treatment of a health care provider based on the health  care provider's examination and assessment of a patient’s specific and unique circumstances. Patients must speak with a health care provider for complete information about their health, medical questions, and treatment options, including any risks or benefits regarding use of medications. This information does not endorse any treatments or medications as safe, effective, or approved for treating a specific patient. Up[employer]Date, [employer] and its affiliates disclaim any warranty or liability relating to this information or the use thereof. The use of this information is governed by the Terms of Use, available at https://www.woltersArecont Visionuwer.com/en/know/clinical-effectiveness-terms   Copyright   Copyright © 2024 UpToDate, [employer]. and its affiliates and/or licensors. All rights reserved.